# Patient Record
Sex: MALE | Race: WHITE | NOT HISPANIC OR LATINO | Employment: FULL TIME | URBAN - METROPOLITAN AREA
[De-identification: names, ages, dates, MRNs, and addresses within clinical notes are randomized per-mention and may not be internally consistent; named-entity substitution may affect disease eponyms.]

---

## 2018-01-11 NOTE — RESULT NOTES
Verified Results  (1) COMPREHENSIVE METABOLIC PANEL [id number] 38:15XY Brooks Valle [employer]     Test Name Result Flag Reference   GLUCOSE 108 mg/dL H 65-99   Fasting reference interval   UREA NITROGEN (BUN) 16 mg/dL  7-25   CREATININE 0 91 mg/dL  0 60-1 35   eGFR NON-AFR  AMERICAN 104 mL/min/1 73m2  > OR = 60   eGFR AFRICAN AMERICAN 120 mL/min/1 73m2  > OR = 60   BUN/CREATININE RATIO   1-14   NOT APPLICABLE (calc)   SODIUM 138 mmol/L  135-146   POTASSIUM 4 6 mmol/L  3 5-5 3   CHLORIDE 102 mmol/L     CARBON DIOXIDE 24 mmol/L  19-30   CALCIUM 9 8 mg/dL  8 6-10 3   PROTEIN, TOTAL 7 5 g/dL  6 1-8 1   ALBUMIN 4 6 g/dL  3 6-5 1   GLOBULIN 2 9 g/dL (calc)  1 9-3 7   ALBUMIN/GLOBULIN RATIO 1 6 (calc)  1 0-2 5   BILIRUBIN, TOTAL 0 6 mg/dL  0 2-1 2   ALKALINE PHOSPHATASE 75 U/L     AST 29 U/L  10-40   ALT 41 U/L  9-46   GLUCOSE 108 mg/dL H 65-99   Fasting reference interval   UREA NITROGEN (BUN) 16 mg/dL  7-25   CREATININE 0 91 mg/dL  0 60-1 35   eGFR NON-AFR  AMERICAN 104 mL/min/1 73m2  > OR = 60   eGFR AFRICAN AMERICAN 120 mL/min/1 73m2  > OR = 60   BUN/CREATININE RATIO   0-50   NOT APPLICABLE (calc)   SODIUM 138 mmol/L  135-146   POTASSIUM 4 6 mmol/L  3 5-5 3   CHLORIDE 102 mmol/L     CARBON DIOXIDE 24 mmol/L  19-30   CALCIUM 9 8 mg/dL  8 6-10 3   PROTEIN, TOTAL 7 5 g/dL  6 1-8 1   ALBUMIN 4 6 g/dL  3 6-5 1   GLOBULIN 2 9 g/dL (calc)  1 9-3 7   ALBUMIN/GLOBULIN RATIO 1 6 (calc)  1 0-2 5   BILIRUBIN, TOTAL 0 6 mg/dL  0 2-1 2   ALKALINE PHOSPHATASE 75 U/L     AST 29 U/L  10-40   ALT 41 U/L  9-46           (1) PSA (SCREEN) (Dx V76 44 Screen for Prostate Cancer) [id number] 08:58AM Brooks Valle [facility]   REPORT COMMENT:  FASTING:YES     Test Name Result Flag Reference   PSA, TOTAL 0 3 ng/mL  < OR = 4 0   This test was performed using the Siemens  chemiluminescent method  Values obtained from  different assay methods cannot be used  interchangeably   PSA levels, regardless of  value, should not be interpreted as absolute  evidence of the presence or absence of disease  (Q) THYROID CASCADING REFLEX 25Jun2016 08:58AM Xanderjacklyn Kory Benjamín     Test Name Result Flag Reference   TSH 1 72 mIU/L  0 40-4 50   INTERPRETATION      TSH within normal range  Consistent with euthyroid  patient  Interference from heterophilic antibodies (more common)  or autoantibody (less common) should be considered when   the TSH value does not fit the clinical picture  TSH   with HAMA Treatment (test code [phone]) or TSH Antibody   (test code 30725) may help identify such interferences  TSH 1 72 mIU/L  0 40-4 50   INTERPRETATION      TSH within normal range  Consistent with euthyroid  patient  Interference from heterophilic antibodies (more common)  or autoantibody (less common) should be considered when   the TSH value does not fit the clinical picture  TSH   with HAMA Treatment (test code [phone] 63) or TSH Antibody   (test code 54948) may help identify such interferences  (Q) LIPID PANEL WITH REFLEX TO DIRECT LDL [id number] 08:58AM Kory Valle     Test Name Result Flag Reference   CHOLESTEROL, TOTAL 187 mg/dL  125-200   HDL CHOLESTEROL 41 mg/dL  > OR = 40   TRIGLICERIDES 601 mg/dL H <150   LDL-CHOLESTEROL 113 mg/dL (calc)  <130   Desirable range <100 mg/dL for patients with CHD or  diabetes and <70 mg/dL for diabetic patients with  known heart disease  CHOL/HDLC RATIO 4 6 (calc)  < OR = 5 0   NON HDL CHOLESTEROL 146 mg/dL (calc)     Target for non-HDL cholesterol is 30 mg/dL higher than   LDL cholesterol target

## 2018-01-15 NOTE — PROGRESS NOTES
Assessment   1  Benign essential hypertension (401 1) (I10)  2  BMI 45 0-49 9, adult (V85 42) (Z68 42)  3  Morbid obesity (278 01) (E66 01)  4  High triglycerides (272 1) (E78 1)  5  Encounter for preventive health examination (V70 0) (Z00 00)  6  Prediabetes (790 29) (R73 09)    Plan  Benign essential hypertension    · Stop: Lisinopril 10 MG Oral Tablet   · Start: Lisinopril 20 MG Oral Tablet; 1 every day   · Begin a limited exercise program ; Status:Complete;   Done: 93OYY1305   · Restrict the salt in your diet by avoiding highly salted foods ; Status:Complete;   Done:  13HCY9041   · Shared Decision Making Aid given; Status:Complete;   Done: 64SKF9410  Colon cancer screening    · DIGITAL RECTAL EXAM; Status:Complete;   Done: 95UIF2484 12:00AM1   High triglycerides    · Follow-up visit in 1 month Evaluation and Treatment  Follow-up  Status: Complete -  Scheduling  Done: 65PKS3070 07:27PM     1 Amended By: Jose Escobar ; Jul 11 2016 9:31 PM EST    Discussion/Summary  Impression: health maintenance visit  Currently, he eats a poor diet and has an inadequate exercise regimen  Prostate cancer screening: the risks and benefits of prostate cancer screening were discussed  We should monitor stable controlled hypertension and prediabetes every 6 months but we need to increase your lisinopril from 10mg/d to 20mg/d and recheck in about 1 month  Chief Complaint  Seen for a CPE  er/cma  History of Present Illness  HPI: labs reviewed  no cough on lisinopril 10mg  not sure if ever was controlled in past  can do more TLC      Review of Systems    Cardiovascular: no chest pain and no palpitations  Respiratory: no shortness of breath and no cough  Gastrointestinal: no abdominal pain  Active Problems   1  Benign essential hypertension (401 1) (I10)  2  BMI 45 0-49 9, adult (V85 42) (Z68 42)  3  Colon cancer screening (V76 51) (Z12 11)  4  Immunization due (V05 9) (Z23)  5   Left shoulder pain (989 41) (M25 512)  6  Morbid obesity (278 01) (E66 01)  7  Osteoarthritis of both knees, unspecified osteoarthritis type (715 96) (M17 0)  8  Patellofemoral syndrome, bilateral (719 46) (M22 2X1,M22 2X2)  9  Prediabetes (790 29) (R73 09)  10  Prostate cancer screening (V76 44) (Z12 5)  11  Screening for cardiovascular condition (V81 2) (Z13 6)  12  Screening for diabetes mellitus (DM) (V77 1) (Z13 1)  13  Screening for lipid disorders (V77 91) (Z13 220)  14  Thyroid disorder screening (V77 0) (Z13 29)  15  Tinea cruris (110 3) (B35 6)    Past Medical History    · History of Acute maxillary sinusitis, recurrence not specified (461 0) (J01 00)   · History of hypertension (V12 59) (Z86 79)    Family History  Father    · No pertinent family history    Social History    · Alcohol drinker (V49 89) (Z78 9)   · Never a smoker    Current Meds  1  Lisinopril 10 MG Oral Tablet; 1 every day  Requested for: 13Jun2016; Last Rx:10Jun2016   Ordered    Allergies   1  No Known Drug Allergies    Vitals   Recorded: [id number] 04:53PM Recorded: 85NAR8826 04:37PM   Temperature  98 5 F   Heart Rate  80   Respiration  20   Systolic 640 254   Diastolic 94 96   Height  5 ft 11 4 in   Weight  348 lb 4 00 oz   BMI Calculated  48 03   BSA Calculated  2 68     Physical Exam    Constitutional   General appearance: No acute distress, well appearing and well nourished  Eyes   Conjunctiva and lids: No erythema, swelling or discharge  Pupils and irises: Equal, round, reactive to light  Ears, Nose, Mouth, and Throat   External inspection of ears and nose: Normal     Otoscopic examination: Tympanic membranes translucent with normal light reflex  Canals patent without erythema  Nasal mucosa, septum, and turbinates: Normal without edema or erythema  Lips, teeth, and gums: Normal, good dentition  Oropharynx: Normal with no erythema, edema, exudate or lesions  Neck   Neck: Supple, symmetric, trachea midline, no masses      Thyroid: Normal, no thyromegaly  Pulmonary   Respiratory effort: No increased work of breathing or signs of respiratory distress  Auscultation of lungs: Clear to auscultation  Cardiovascular   Auscultation of heart: Normal rate and rhythm, normal S1 and S2, no murmurs  Carotid pulses: 2+ bilaterally  Pedal pulses: 2+ bilaterally  Examination of extremities for edema and/or varicosities: Normal     Abdomen   Abdomen: Non-tender, no masses  Liver and spleen: No hepatomegaly or splenomegaly  Examination for hernias: No hernias appreciated  Genitourinary   Scrotal contents: Normal testes, no masses  Penis: Normal, no lesions  Digital rectal exam of prostate: Normal size, no masses  Lymphatic   Palpation of lymph nodes in neck: No lymphadenopathy  Palpation of lymph nodes in groin: No lymphadenopathy  Musculoskeletal   Gait and station: Normal     Inspection/palpation of digits and nails: Normal without clubbing or cyanosis  Inspection/palpation of joints, bones, and muscles: Normal     Range of motion: Normal     Stability: Normal     Muscle strength/tone: Normal     Skin   Skin and subcutaneous tissue: Normal without rashes or lesions  Palpation of skin and subcutaneous tissue: Normal turgor  Neurologic   Reflexes: 2+ and symmetric  Sensation: No sensory loss  Psychiatric   Judgment and insight: Normal     Mood and affect: Normal        Procedure    Procedure: Visual Acuity Test    Indication: routine screening  Inforrmation supplied by a Snellen chart     Results: 20/15 in both eyes without corrective device, 20/50 in the right eye without corrective device, 20/15 in the left eye without corrective device      Future Appointments    Date/Time Provider Specialty Site   08/10/2016 04:30 PM Marleny Thrasher, [id number]     Signatures   Electronically signed by : Ashely Bird DO; Jul 11 2016  9:31PM EST                       (Author)

## 2018-01-26 ENCOUNTER — TRANSCRIBE ORDERS (OUTPATIENT)
Dept: LAB | Facility: CLINIC | Age: 43
End: 2018-01-26

## 2018-02-01 DIAGNOSIS — Z00.00 HEALTHCARE MAINTENANCE: ICD-10-CM

## 2018-02-01 DIAGNOSIS — I10 BENIGN ESSENTIAL HYPERTENSION: Primary | ICD-10-CM

## 2018-02-01 DIAGNOSIS — R73.03 PREDIABETES: ICD-10-CM

## 2018-02-01 PROBLEM — Z23 IMMUNIZATION DUE: Status: ACTIVE | Noted: 2018-02-01

## 2018-02-01 PROBLEM — Z13.83 SCREENING FOR CARDIOVASCULAR, RESPIRATORY, AND GENITOURINARY DISEASES: Status: ACTIVE | Noted: 2018-02-01

## 2018-02-01 PROBLEM — Z13.89 SCREENING FOR CARDIOVASCULAR, RESPIRATORY, AND GENITOURINARY DISEASES: Status: ACTIVE | Noted: 2018-02-01

## 2018-02-01 PROBLEM — Z13.6 SCREENING FOR CARDIOVASCULAR, RESPIRATORY, AND GENITOURINARY DISEASES: Status: ACTIVE | Noted: 2018-02-01

## 2018-02-01 PROBLEM — Z12.5 PROSTATE CANCER SCREENING: Status: ACTIVE | Noted: 2018-02-01

## 2018-02-01 RX ORDER — LISINOPRIL 20 MG/1
TABLET ORAL DAILY
COMMUNITY
Start: 2016-07-11 | End: 2018-02-02 | Stop reason: SDUPTHER

## 2018-02-01 RX ORDER — ERGOCALCIFEROL 1.25 MG/1
CAPSULE ORAL
COMMUNITY
Start: 2018-01-31 | End: 2018-06-19

## 2018-02-02 ENCOUNTER — OFFICE VISIT (OUTPATIENT)
Dept: FAMILY MEDICINE CLINIC | Facility: CLINIC | Age: 43
End: 2018-02-02
Payer: COMMERCIAL

## 2018-02-02 VITALS
RESPIRATION RATE: 20 BRPM | BODY MASS INDEX: 44.1 KG/M2 | DIASTOLIC BLOOD PRESSURE: 92 MMHG | WEIGHT: 315 LBS | SYSTOLIC BLOOD PRESSURE: 140 MMHG | TEMPERATURE: 97.3 F | HEART RATE: 84 BPM | HEIGHT: 71 IN

## 2018-02-02 DIAGNOSIS — I10 BENIGN ESSENTIAL HYPERTENSION: ICD-10-CM

## 2018-02-02 PROBLEM — E55.9 VITAMIN D DEFICIENCY: Status: ACTIVE | Noted: 2018-01-31

## 2018-02-02 PROBLEM — E11.8 TYPE 2 DIABETES MELLITUS WITH COMPLICATION, WITHOUT LONG-TERM CURRENT USE OF INSULIN (HCC): Status: ACTIVE | Noted: 2018-01-31

## 2018-02-02 PROBLEM — R06.83 SNORING: Status: ACTIVE | Noted: 2018-01-17

## 2018-02-02 PROCEDURE — 99214 OFFICE O/P EST MOD 30 MIN: CPT | Performed by: FAMILY MEDICINE

## 2018-02-02 RX ORDER — LISINOPRIL 20 MG/1
20 TABLET ORAL DAILY
Qty: 90 TABLET | Refills: 0 | Status: SHIPPED | OUTPATIENT
Start: 2018-02-02 | End: 2018-05-03 | Stop reason: SDUPTHER

## 2018-02-02 NOTE — PROGRESS NOTES
Assessment/Plan:    No problem-specific Assessment & Plan notes found for this encounter  Diagnoses and all orders for this visit:    Benign essential hypertension          Low vit D on meds  Obesity , TLC in progress  Normal foot exam  Stop metformin and recheck  Labs once bp meds stable    Return in about 4 weeks (around 3/2/2018) for Recheck  Subjective:      Patient ID: Evelyne Spurling is a 38 y o  male  Chief Complaint   Patient presents with    Follow-up     Labs from bariatric doctors       Newly dx DM started on metformin 2d ago, 500mg 1x/d by bariatric surgeon in [facility]  Elevated LFTs, has order for US liver  A1c 8 3  Started diet for past 2w    No surgery planned but nonsurgical  25# wt loss in past month  Bariatric program q 3w  Vit D 10, started on drisdol 50K units    Calories / d 5903-0480/d  Starting to exercise      Just restarted lisinopril 20mg/d this week  Did not monitor bp in past 2y  Running out of it  No cough    Sleep study pending at [facility] per pt          The following portions of the patient's history were reviewed and updated as appropriate: allergies, current medications, past family history, past medical history, past social history, past surgical history and problem list     Review of Systems   Constitutional: Negative for chills and fever  Respiratory: Negative for cough  Cardiovascular: Negative for chest pain  Gastrointestinal: Negative for blood in stool and constipation  Musculoskeletal: Positive for joint swelling  Neurological: Negative for dizziness and light-headedness  Current Outpatient Prescriptions   Medication Sig Dispense Refill    ergocalciferol (VITAMIN D2) 50,000 units       lisinopril (ZESTRIL) 20 mg tablet Take by mouth daily       No current facility-administered medications for this visit          Objective:    /92   Pulse 84   Temp (!) 97 3 °F (36 3 °C)   Resp 20   Ht 5' 10 5" (1 791 m)   Wt (!) 159 kg (350 lb)   BMI 49 51 kg/m²        Physical Exam   Constitutional: He appears well-developed  HENT:   Head: Normocephalic  Eyes: Conjunctivae are normal    Neck: Neck supple  Cardiovascular: Normal rate and intact distal pulses  Pulmonary/Chest: Effort normal  No respiratory distress  Abdominal: Soft  Musculoskeletal: He exhibits no edema or deformity  Neurological: He is alert  Skin: Skin is warm and dry  Psychiatric: His behavior is normal  Thought content normal    Nursing note and vitals reviewed               Monisha Everett DO

## 2018-02-02 NOTE — PROGRESS NOTES
Diabetic Foot Exam    Patient's shoes and socks removed  Right Foot/Ankle   Right Foot Inspection  Skin Exam: callus and callus                                  Left Foot/Ankle  Left Foot Inspection  Skin Exam: callus                                             Assign Risk Category:  No deformity present; No loss of protective sensation;  No weak pulses       Risk: 0

## 2018-02-02 NOTE — PATIENT INSTRUCTIONS
Though I recognize that you are diabetic, please consider stopping the metformin and continuing your successful diet/exercise/weight loss efforts for the next 90 days after which we should recheck the values that would prove Diabetes if still present      Please follow up in 2-3 weeks for your Hypertension

## 2018-05-03 DIAGNOSIS — I10 BENIGN ESSENTIAL HYPERTENSION: ICD-10-CM

## 2018-05-03 DIAGNOSIS — I10 ESSENTIAL HYPERTENSION: Primary | ICD-10-CM

## 2018-05-03 RX ORDER — LISINOPRIL 20 MG/1
20 TABLET ORAL DAILY
Qty: 30 TABLET | Refills: 0 | Status: SHIPPED | OUTPATIENT
Start: 2018-05-03 | End: 2018-07-30 | Stop reason: SDUPTHER

## 2018-06-19 ENCOUNTER — OFFICE VISIT (OUTPATIENT)
Dept: FAMILY MEDICINE CLINIC | Facility: CLINIC | Age: 43
End: 2018-06-19
Payer: COMMERCIAL

## 2018-06-19 VITALS
WEIGHT: 311 LBS | DIASTOLIC BLOOD PRESSURE: 80 MMHG | HEART RATE: 78 BPM | BODY MASS INDEX: 43.54 KG/M2 | TEMPERATURE: 97.4 F | RESPIRATION RATE: 18 BRPM | HEIGHT: 71 IN | SYSTOLIC BLOOD PRESSURE: 132 MMHG

## 2018-06-19 DIAGNOSIS — J06.9 ACUTE UPPER RESPIRATORY INFECTION: Primary | ICD-10-CM

## 2018-06-19 PROCEDURE — 99213 OFFICE O/P EST LOW 20 MIN: CPT | Performed by: NURSE PRACTITIONER

## 2018-06-19 RX ORDER — ALBUTEROL SULFATE 90 UG/1
2 AEROSOL, METERED RESPIRATORY (INHALATION) EVERY 4 HOURS PRN
Qty: 8.5 G | Refills: 0 | Status: SHIPPED | OUTPATIENT
Start: 2018-06-19 | End: 2019-03-01

## 2018-06-19 RX ORDER — AMOXICILLIN 875 MG/1
875 TABLET, COATED ORAL 2 TIMES DAILY
Qty: 20 TABLET | Refills: 0 | Status: SHIPPED | OUTPATIENT
Start: 2018-06-19 | End: 2018-06-29

## 2018-06-19 NOTE — PROGRESS NOTES
Assessment/Plan:    Problem List Items Addressed This Visit     None      Visit Diagnoses     Acute upper respiratory infection    -  Primary    Relevant Medications    amoxicillin (AMOXIL) 875 mg tablet    albuterol (PROAIR HFA) 90 mcg/act inhaler          Patient Instructions   Drinking plenty of fluids, saline nasal rinses or nasal spray, and steam or cool air humidification are all effective ways of managing symptoms  May take Mucinex D for sinus congestion, or Coricidin HBP if you have a heart condition or high blood pressure  Mucinex or Robitussin DM are used to control cough symptoms and include an expectorant  May take Ibuprofen or Tylenol as needed for pain or fevers  Recommend recheck if symptoms do not resolve or improve within 1 week  Return if symptoms worsen or fail to improve  Subjective:      Patient ID: Yrn Mims is a 38 y o  male  Chief Complaint   Patient presents with    Nasal Congestion     for the past week  [id number]    Sore Throat    Cough       C/o sinus congestion, productive cough, headache, and dizziness  Denies fevers, SOB, but has occasional wheezing  Cough is painful  Tried Advil cold and sinus which isn't helping  No history of seasonal allergies        The following portions of the patient's history were reviewed and updated as appropriate: allergies, current medications, past family history, past medical history, past social history, past surgical history and problem list     Review of Systems   Constitutional: Positive for fatigue  Negative for chills and fever  HENT: Positive for congestion, sinus pressure and sore throat  Negative for ear pain, postnasal drip and rhinorrhea  Respiratory: Positive for cough and wheezing  Negative for shortness of breath  Cardiovascular: Negative for chest pain  Gastrointestinal: Negative for abdominal pain, diarrhea, nausea and vomiting  Musculoskeletal: Negative for arthralgias     Skin: Negative for rash    Neurological: Positive for headaches  Current Outpatient Prescriptions   Medication Sig Dispense Refill    lisinopril (ZESTRIL) 20 mg tablet TAKE 1 TABLET (20 MG TOTAL) BY MOUTH DAILY 30 tablet 0    albuterol (PROAIR HFA) 90 mcg/act inhaler Inhale 2 puffs every 4 (four) hours as needed for wheezing 8 5 g 0    amoxicillin (AMOXIL) 875 mg tablet Take 1 tablet (875 mg total) by mouth 2 (two) times a day for 10 days 20 tablet 0     No current facility-administered medications for this visit  Objective:    /80   Pulse 78   Temp (!) 97 4 °F (36 3 °C)   Resp 18   Ht 5' 10 5" (1 791 m)   Wt (!) 141 kg (311 lb)   BMI 43 99 kg/m²        Physical Exam   Constitutional: He appears well-developed and well-nourished  HENT:   Right Ear: External ear and ear canal normal  Tympanic membrane is bulging  Left Ear: External ear and ear canal normal  Tympanic membrane is bulging  Nose: Mucosal edema present  Right sinus exhibits maxillary sinus tenderness  Left sinus exhibits maxillary sinus tenderness  Mouth/Throat: Oropharynx is clear and moist and mucous membranes are normal    Eyes: Conjunctivae are normal    Cardiovascular: Normal rate, regular rhythm and normal heart sounds  Pulmonary/Chest: Effort normal  He has wheezes  Abdominal: Bowel sounds are normal  He exhibits no distension  There is no splenomegaly or hepatomegaly  There is no tenderness  Lymphadenopathy:        Right cervical: No superficial cervical adenopathy present  Left cervical: No superficial cervical adenopathy present  Skin: No rash noted  Psychiatric: He has a normal mood and affect  Nursing note and vitals reviewed               Keyon Watts

## 2018-07-30 ENCOUNTER — TELEPHONE (OUTPATIENT)
Dept: FAMILY MEDICINE CLINIC | Facility: CLINIC | Age: 43
End: 2018-07-30

## 2018-07-30 DIAGNOSIS — I10 BENIGN ESSENTIAL HYPERTENSION: ICD-10-CM

## 2018-07-30 PROCEDURE — 4010F ACE/ARB THERAPY RXD/TAKEN: CPT | Performed by: FAMILY MEDICINE

## 2018-07-30 RX ORDER — LISINOPRIL 20 MG/1
20 TABLET ORAL DAILY
Qty: 30 TABLET | Refills: 3 | Status: SHIPPED | OUTPATIENT
Start: 2018-07-30 | End: 2018-12-01 | Stop reason: SDUPTHER

## 2018-07-30 NOTE — TELEPHONE ENCOUNTER
Pt stated he called in a prescription for his Lisinopril, he is out can another Dr Taryn Guzmans this since Dr Lore Cowan is out of the office?  It is supposed to go to [facility] in [place], he called over there and it has yet to be done

## 2018-08-07 ENCOUNTER — OFFICE VISIT (OUTPATIENT)
Dept: FAMILY MEDICINE CLINIC | Facility: CLINIC | Age: 43
End: 2018-08-07
Payer: COMMERCIAL

## 2018-08-07 VITALS
HEIGHT: 71 IN | SYSTOLIC BLOOD PRESSURE: 138 MMHG | BODY MASS INDEX: 43.96 KG/M2 | HEART RATE: 84 BPM | DIASTOLIC BLOOD PRESSURE: 84 MMHG | RESPIRATION RATE: 20 BRPM | WEIGHT: 314 LBS | TEMPERATURE: 98 F

## 2018-08-07 DIAGNOSIS — S16.1XXA CERVICAL MYOFASCIAL STRAIN, INITIAL ENCOUNTER: Primary | ICD-10-CM

## 2018-08-07 DIAGNOSIS — H57.89 REDNESS OF EYE, LEFT: ICD-10-CM

## 2018-08-07 DIAGNOSIS — H57.89: ICD-10-CM

## 2018-08-07 PROCEDURE — 3008F BODY MASS INDEX DOCD: CPT | Performed by: NURSE PRACTITIONER

## 2018-08-07 PROCEDURE — 1036F TOBACCO NON-USER: CPT | Performed by: NURSE PRACTITIONER

## 2018-08-07 PROCEDURE — 99214 OFFICE O/P EST MOD 30 MIN: CPT | Performed by: NURSE PRACTITIONER

## 2018-08-07 RX ORDER — CYCLOBENZAPRINE HCL 10 MG
10 TABLET ORAL 3 TIMES DAILY PRN
Qty: 30 TABLET | Refills: 0 | Status: SHIPPED | OUTPATIENT
Start: 2018-08-07 | End: 2019-01-22 | Stop reason: ALTCHOICE

## 2018-08-07 NOTE — LETTER
August 7, 2018    Armida Farah  [facility] [address]      Dear Mr Jenkins:    ***    If you have any questions or concerns, please don't hesitate to call      Sincerely,             LARS Graves        CC: No Recipients

## 2018-08-07 NOTE — PROGRESS NOTES
LAssessment/Plan:    Recommended eval by his ophthalmologist   Lindsay Gasca to call office if he is unable to get an appt for today or tomorrow  Advised on supportive care of cervical strain  Moist heat, Ibuprofen as needed  Muscle relaxer as needed  May not drive while taking this  Problem List Items Addressed This Visit     None      Visit Diagnoses     Cervical myofascial strain, initial encounter    -  Primary    Relevant Medications    cyclobenzaprine (FLEXERIL) 10 mg tablet    Swelling or mass of left eye        Redness of eye, left              There are no Patient Instructions on file for this visit  Return if symptoms worsen or fail to improve  Subjective:      Patient ID: Ramakrishna Perez is a 38 y o  male  Chief Complaint   Patient presents with    Eye Pain     Left eye redness with tenderness and he can see a bump in his eye Binta VIRGEN       Yesterday his left eye started to become red  Later in the evening, he noticed a small blister or bump the left of his iris  It feels swollen and is somewhat painful  Denies blurred vision or floaters  Denies eye drainage  No known trauma    Also c/o right sided neck pain for the past couple of weeks  He takes Ibuprofen 800mg which doesn't help  Hurts to turn his neck and look up  Heat does not help  Pain does not radiate  No numbness or weakness  No known injury  The following portions of the patient's history were reviewed and updated as appropriate: allergies, current medications, past family history, past medical history, past social history, past surgical history and problem list     Review of Systems   Constitutional: Negative  Eyes: Positive for pain and redness  Negative for photophobia, discharge and visual disturbance  Respiratory: Negative for cough and shortness of breath  Cardiovascular: Negative for chest pain  Musculoskeletal: Positive for neck pain  Neurological: Negative for weakness and numbness     All other systems reviewed and are negative  Current Outpatient Prescriptions   Medication Sig Dispense Refill    albuterol (PROAIR HFA) 90 mcg/act inhaler Inhale 2 puffs every 4 (four) hours as needed for wheezing 8 5 g 0    lisinopril (ZESTRIL) 20 mg tablet Take 1 tablet (20 mg total) by mouth daily 30 tablet 3    cyclobenzaprine (FLEXERIL) 10 mg tablet Take 1 tablet (10 mg total) by mouth 3 (three) times a day as needed for muscle spasms 30 tablet 0     No current facility-administered medications for this visit  Objective:    /84   Pulse 84   Temp 98 °F (36 7 °C)   Resp 20   Ht 5' 10 5" (1 791 m)   Wt (!) 142 kg (314 lb)   BMI 44 42 kg/m²        Physical Exam   Constitutional: He appears well-developed and well-nourished  HENT:   Right Ear: Tympanic membrane, external ear and ear canal normal    Left Ear: Tympanic membrane, external ear and ear canal normal    Nose: No mucosal edema  Mouth/Throat: Oropharynx is clear and moist and mucous membranes are normal    Eyes: Conjunctivae and EOM are normal  Pupils are equal, round, and reactive to light  Right eye exhibits no discharge  Left eye exhibits no discharge  Raised vesicular appearing lesion aprox 1-2mm in size  Erythematous capillaries extending from lesion to outer aspect of eye  No subconjunctival hemorrhage  Cardiovascular: Normal rate, regular rhythm and normal heart sounds  Pulmonary/Chest: Effort normal and breath sounds normal    Abdominal: Bowel sounds are normal  He exhibits no distension  There is no splenomegaly or hepatomegaly  There is no tenderness  Lymphadenopathy:        Right cervical: No superficial cervical adenopathy present  Left cervical: No superficial cervical adenopathy present  Skin: No rash noted  Psychiatric: He has a normal mood and affect  Nursing note and vitals reviewed               Chelo Monterroso

## 2018-12-01 DIAGNOSIS — I10 BENIGN ESSENTIAL HYPERTENSION: ICD-10-CM

## 2018-12-02 PROCEDURE — 4010F ACE/ARB THERAPY RXD/TAKEN: CPT | Performed by: FAMILY MEDICINE

## 2018-12-02 RX ORDER — LISINOPRIL 20 MG/1
TABLET ORAL
Qty: 30 TABLET | Refills: 0 | Status: SHIPPED | OUTPATIENT
Start: 2018-12-02 | End: 2019-01-22 | Stop reason: SDUPTHER

## 2018-12-31 ENCOUNTER — TELEPHONE (OUTPATIENT)
Dept: FAMILY MEDICINE CLINIC | Facility: CLINIC | Age: 43
End: 2018-12-31

## 2018-12-31 NOTE — TELEPHONE ENCOUNTER
Spoke with patient states going to [facility]ariat[facility], and having DM follow up with Dr Mignon Leach  af/rma

## 2019-01-21 PROBLEM — E11.8 TYPE 2 DIABETES MELLITUS WITH COMPLICATION, WITHOUT LONG-TERM CURRENT USE OF INSULIN (HCC): Status: RESOLVED | Noted: 2018-01-31 | Resolved: 2019-01-21

## 2019-01-21 RX ORDER — INFLUENZA A VIRUS A/SINGAPORE/GP1908/2015 IVR-180A (H1N1) ANTIGEN (PROPIOLACTONE INACTIVATED), INFLUENZA A VIRUS A/HONG KONG/4801/2014 X-263B (H3N2) ANTIGEN (PROPIOLACTONE INACTIVATED), INFLUENZA B VIRUS B/BRISBANE/46/2015 ANTIGEN (PROPIOLACTONE INACTIVATED), AND INFLUENZA B VIRUS B/PHUKET/3073/2013 BVR-1B ANTIGEN (PROPIOLACTONE INACTIVATED) 15; 15; 15; 15 UG/.5ML; UG/.5ML; UG/.5ML; UG/.5ML
INJECTION, SUSPENSION INTRAMUSCULAR
Refills: 0 | COMMUNITY
Start: 2018-10-28 | End: 2019-04-11

## 2019-01-22 ENCOUNTER — OFFICE VISIT (OUTPATIENT)
Dept: FAMILY MEDICINE CLINIC | Facility: CLINIC | Age: 44
End: 2019-01-22
Payer: COMMERCIAL

## 2019-01-22 VITALS
TEMPERATURE: 96.4 F | HEART RATE: 76 BPM | BODY MASS INDEX: 44.1 KG/M2 | RESPIRATION RATE: 16 BRPM | DIASTOLIC BLOOD PRESSURE: 82 MMHG | HEIGHT: 71 IN | SYSTOLIC BLOOD PRESSURE: 130 MMHG | WEIGHT: 315 LBS

## 2019-01-22 DIAGNOSIS — L30.9 DERMATITIS: Primary | ICD-10-CM

## 2019-01-22 DIAGNOSIS — R73.03 PREDIABETES: ICD-10-CM

## 2019-01-22 DIAGNOSIS — Z12.5 PROSTATE CANCER SCREENING: ICD-10-CM

## 2019-01-22 DIAGNOSIS — Z13.89 SCREENING FOR CARDIOVASCULAR, RESPIRATORY, AND GENITOURINARY DISEASES: ICD-10-CM

## 2019-01-22 DIAGNOSIS — L21.9 SEBORRHEIC DERMATITIS: ICD-10-CM

## 2019-01-22 DIAGNOSIS — Z13.6 SCREENING FOR CARDIOVASCULAR, RESPIRATORY, AND GENITOURINARY DISEASES: ICD-10-CM

## 2019-01-22 DIAGNOSIS — I10 BENIGN ESSENTIAL HYPERTENSION: ICD-10-CM

## 2019-01-22 DIAGNOSIS — Z13.83 SCREENING FOR CARDIOVASCULAR, RESPIRATORY, AND GENITOURINARY DISEASES: ICD-10-CM

## 2019-01-22 DIAGNOSIS — E66.01 MORBID OBESITY (HCC): ICD-10-CM

## 2019-01-22 DIAGNOSIS — I10 ESSENTIAL HYPERTENSION: ICD-10-CM

## 2019-01-22 PROCEDURE — 3079F DIAST BP 80-89 MM HG: CPT | Performed by: FAMILY MEDICINE

## 2019-01-22 PROCEDURE — 3075F SYST BP GE 130 - 139MM HG: CPT | Performed by: FAMILY MEDICINE

## 2019-01-22 PROCEDURE — 99214 OFFICE O/P EST MOD 30 MIN: CPT | Performed by: FAMILY MEDICINE

## 2019-01-22 RX ORDER — LISINOPRIL 20 MG/1
20 TABLET ORAL DAILY
Qty: 90 TABLET | Refills: 1 | Status: SHIPPED | OUTPATIENT
Start: 2019-01-22 | End: 2019-10-20

## 2019-01-22 RX ORDER — TRIAMCINOLONE ACETONIDE 1 MG/G
CREAM TOPICAL 2 TIMES DAILY
Qty: 80 G | Refills: 1 | Status: SHIPPED | OUTPATIENT
Start: 2019-01-22 | End: 2019-02-07 | Stop reason: ALTCHOICE

## 2019-01-22 RX ORDER — SELENIUM SULFIDE 2.5 MG/100ML
LOTION TOPICAL
Qty: 120 ML | Refills: 5 | Status: SHIPPED | OUTPATIENT
Start: 2019-01-22 | End: 2019-08-21 | Stop reason: ALTCHOICE

## 2019-01-22 NOTE — PROGRESS NOTES
Assessment/Plan:    No problem-specific Assessment & Plan notes found for this encounter  Seborrheic dermatitis vs eczema new  cpe after labs suggested  htn meds refilled, needs q6m f/u, htn stable  Prediabetes and bmi aware  Diet/exercise/weight loss is recommended  Use steroids sparingly  See if selsun lotion works     Diagnoses and all orders for this visit:    Dermatitis  -     triamcinolone (KENALOG) 0 1 % cream; Apply topically 2 (two) times a day Short term, body/ears    Essential hypertension    Prostate cancer screening  -     PSA, Total Screen; Future    Seborrheic dermatitis  -     selenium sulfide (SELSUN) 2 5 % shampoo; Lather whole body for 10 minutes daily for 7 days then 3x/week for prevention    Screening for cardiovascular, respiratory, and genitourinary diseases  -     Lipid Panel with Direct LDL reflex; Future    Prediabetes    Benign essential hypertension  -     lisinopril (ZESTRIL) 20 mg tablet; Take 1 tablet (20 mg total) by mouth daily  -     Comprehensive metabolic panel; Future    Morbid obesity ([facility] [address] )    BMI 40 0-44 9, adult ([facility] [address] )    Other orders  -     AFLURIA QUADRIVALENT 0 5 ML JUAN PABLO; ADM 0 5ML IM UTD              Return for Annual physical     Subjective:      Patient ID: Layla Redding is a 37 y o  male      Chief Complaint   Patient presents with    Rash     under both arms and now spreading to lower arm    Dry Ears     pt states when he scratches they will bleed     Medication Refill     Lisinopril  sent to [facility] pharmacy       HPI  Past year seeing bariatric group  No surgery planned  Lost 80# with TLC  Avoids sugar  htn  Advised of q6m  Exercise about 5x/w  Gets a1c  LFT elevated  Not on metformin    Ears dry  Itchy  Scaly  R>L  No hearing problems    3d  Redness around armpits  Not itchy  No pus or drainage or pus  Used some lotrisone, goes away with tx but comes back  Also on leg and shoulders   Notes since related    Warm showers  Sometimes showers at gym  No cosmetic changes  No pool or jacuzzi    The following portions of the patient's history were reviewed and updated as appropriate: allergies, current medications, past family history, past medical history, past social history, past surgical history and problem list     Review of Systems   Constitutional: Negative for chills and fever  Respiratory: Negative for shortness of breath  Current Outpatient Prescriptions   Medication Sig Dispense Refill    lisinopril (ZESTRIL) 20 mg tablet Take 1 tablet (20 mg total) by mouth daily 90 tablet 1    AFLURIA QUADRIVALENT 0 5 ML JUAN PABLO ADM 0 5ML IM UTD  0    albuterol (PROAIR HFA) 90 mcg/act inhaler Inhale 2 puffs every 4 (four) hours as needed for wheezing (Patient not taking: Reported on 1/22/2019 ) 8 5 g 0    selenium sulfide (SELSUN) 2 5 % shampoo Lather whole body for 10 minutes daily for 7 days then 3x/week for prevention 120 mL 5    triamcinolone (KENALOG) 0 1 % cream Apply topically 2 (two) times a day Short term, body/ears 80 g 1     No current facility-administered medications for this visit  Objective:    /82   Pulse 76   Temp (!) 96 4 °F (35 8 °C)   Resp 16   Ht 5' 10 5" (1 791 m)   Wt (!) 143 kg (316 lb)   BMI 44 70 kg/m²        Physical Exam   Constitutional: He appears well-developed  No distress  HENT:   Head: Normocephalic  Mouth/Throat: No oropharyngeal exudate  Eyes: Conjunctivae are normal  No scleral icterus  Neck: Neck supple  Cardiovascular: Normal rate and intact distal pulses  No murmur heard  Pulmonary/Chest: Effort normal  No respiratory distress  He has no wheezes  Abdominal: Soft  He exhibits no distension  There is no tenderness  Musculoskeletal: He exhibits no edema or deformity  Lymphadenopathy:     He has no cervical adenopathy  Neurological: He is alert  He exhibits normal muscle tone  Skin: Skin is warm and dry  Rash noted     Scaly rash around b/l ear creases, eczematous patches arms/chest, no vesicle/pustule/dc   Psychiatric: His behavior is normal  Thought content normal    Nursing note and vitals reviewed               Roxanna Montoya DO

## 2019-02-07 ENCOUNTER — OFFICE VISIT (OUTPATIENT)
Dept: FAMILY MEDICINE CLINIC | Facility: CLINIC | Age: 44
End: 2019-02-07
Payer: COMMERCIAL

## 2019-02-07 VITALS
TEMPERATURE: 97.1 F | SYSTOLIC BLOOD PRESSURE: 122 MMHG | HEART RATE: 74 BPM | WEIGHT: 309.8 LBS | BODY MASS INDEX: 43.37 KG/M2 | RESPIRATION RATE: 16 BRPM | HEIGHT: 71 IN | DIASTOLIC BLOOD PRESSURE: 82 MMHG

## 2019-02-07 DIAGNOSIS — R21 RASH: Primary | ICD-10-CM

## 2019-02-07 PROCEDURE — 99213 OFFICE O/P EST LOW 20 MIN: CPT | Performed by: NURSE PRACTITIONER

## 2019-02-07 PROCEDURE — 3008F BODY MASS INDEX DOCD: CPT | Performed by: NURSE PRACTITIONER

## 2019-02-07 RX ORDER — CLOTRIMAZOLE AND BETAMETHASONE DIPROPIONATE 10; .64 MG/G; MG/G
CREAM TOPICAL 2 TIMES DAILY
Qty: 30 G | Refills: 0 | Status: SHIPPED | OUTPATIENT
Start: 2019-02-07 | End: 2019-04-11

## 2019-02-07 RX ORDER — PREDNISONE 10 MG/1
TABLET ORAL
Qty: 20 TABLET | Refills: 0 | Status: SHIPPED | OUTPATIENT
Start: 2019-02-07 | End: 2019-02-17

## 2019-02-07 RX ORDER — FLUCONAZOLE 150 MG/1
150 TABLET ORAL
Qty: 3 TABLET | Refills: 0 | Status: SHIPPED | OUTPATIENT
Start: 2019-02-07 | End: 2019-02-22

## 2019-02-07 NOTE — PATIENT INSTRUCTIONS
Follow up with dermatologist   Supportive care discussed and advised  Follow up for no improvement and worsening of conditions  Patient advised and educated when to see immediate medical care

## 2019-02-07 NOTE — PROGRESS NOTES
Assessment/Plan:         Diagnoses and all orders for this visit:    Rash  Comments:  fungal and eczema is in differential and will treat with fluconzaole and steroids and will follow up with dermatology  Orders:  -     predniSONE 10 mg tablet; 4 tabs x 2 days, 3 tabs x 2 days, 2 tabs x 2 days, 1 tab x 2 days  -     fluconazole (DIFLUCAN) 150 mg tablet; Take 1 tablet (150 mg total) by mouth every 7 days for 3 doses  -     clotrimazole-betamethasone (LOTRISONE) 1-0 05 % cream; Apply topically 2 (two) times a day  -     Ambulatory referral to Dermatology; Future  -     Ambulatory referral to Dermatology; Future            Patient Instructions: Follow up with dermatologist   Supportive care discussed and advised  Follow up for no improvement and worsening of conditions  Patient advised and educated when to see immediate medical care  Return if symptoms worsen or fail to improve  Subjective:      Patient ID: Kathryn Atkinson is a 37 y o  male  Chief Complaint   Patient presents with    Rash     xfew months  [facility]  Patient was seen in office on 1/22 for rash and dry ears  Stated that used kenalog cream on ears and helped and when he stopped using cream, the dryness and itching of ears came back  Also used shampoo selenium and helped but after using 8 days stopped and rash is coming back on his body and scalp  Stated that now rash is on arms, abdomen, scalp  Stated that always had the eczema rash on extensors of elbows  Goes to gym every day  The following portions of the patient's history were reviewed and updated as appropriate: allergies, current medications, past family history, past medical history, past social history, past surgical history and problem list       Review of Systems   Constitutional: Negative  Respiratory: Negative  Cardiovascular: Negative  Gastrointestinal: Negative  Genitourinary: Negative  Musculoskeletal: Negative  Skin: Positive for rash  Neurological: Negative  Psychiatric/Behavioral: Negative  Objective:    History   Smoking Status    Former Smoker   Smokeless Tobacco    Former User     Comment: Never smoker per [employer]       Allergies: No Known Allergies    Vitals:  /82   Pulse 74   Temp (!) 97 1 °F (36 2 °C)   Resp 16   Ht 5' 10 5" (1 791 m)   Wt (!) 141 kg (309 lb 12 8 oz)   BMI 43 82 kg/m²     Current Outpatient Prescriptions   Medication Sig Dispense Refill    AFLURIA QUADRIVALENT 0 5 ML JUAN PABLO ADM 0 5ML IM UTD  0    lisinopril (ZESTRIL) 20 mg tablet Take 1 tablet (20 mg total) by mouth daily 90 tablet 1    selenium sulfide (SELSUN) 2 5 % shampoo Lather whole body for 10 minutes daily for 7 days then 3x/week for prevention 120 mL 5    albuterol (PROAIR HFA) 90 mcg/act inhaler Inhale 2 puffs every 4 (four) hours as needed for wheezing (Patient not taking: Reported on 1/22/2019 ) 8 5 g 0    clotrimazole-betamethasone (LOTRISONE) 1-0 05 % cream Apply topically 2 (two) times a day 30 g 0    fluconazole (DIFLUCAN) 150 mg tablet Take 1 tablet (150 mg total) by mouth every 7 days for 3 doses 3 tablet 0    predniSONE 10 mg tablet 4 tabs x 2 days, 3 tabs x 2 days, 2 tabs x 2 days, 1 tab x 2 days 20 tablet 0     No current facility-administered medications for this visit  Physical Exam   Constitutional: He is oriented to person, place, and time  He appears well-developed and well-nourished  Cardiovascular: Normal rate, regular rhythm and normal heart sounds  Pulmonary/Chest: Effort normal and breath sounds normal    Neurological: He is alert and oriented to person, place, and time  Skin: Skin is warm and dry  Rash noted  Eczema patches noted on extensors of bilateral elbows  Dry scaly rash on b/l ear at creases  Maculopapular scattered spots on both arms, abdominal area  Psychiatric: He has a normal mood and affect   His behavior is normal  Judgment and thought content normal  LARS Soni

## 2019-03-01 ENCOUNTER — OFFICE VISIT (OUTPATIENT)
Dept: URGENT CARE | Facility: CLINIC | Age: 44
End: 2019-03-01
Payer: COMMERCIAL

## 2019-03-01 VITALS
HEIGHT: 71 IN | OXYGEN SATURATION: 96 % | RESPIRATION RATE: 15 BRPM | DIASTOLIC BLOOD PRESSURE: 75 MMHG | HEART RATE: 94 BPM | BODY MASS INDEX: 44.1 KG/M2 | TEMPERATURE: 96.6 F | SYSTOLIC BLOOD PRESSURE: 144 MMHG | WEIGHT: 315 LBS

## 2019-03-01 DIAGNOSIS — J00 NASOPHARYNGITIS: Primary | ICD-10-CM

## 2019-03-01 LAB — S PYO AG THROAT QL: NEGATIVE

## 2019-03-01 PROCEDURE — G0382 LEV 3 HOSP TYPE B ED VISIT: HCPCS | Performed by: PHYSICIAN ASSISTANT

## 2019-03-01 PROCEDURE — 87430 STREP A AG IA: CPT | Performed by: PHYSICIAN ASSISTANT

## 2019-03-01 RX ORDER — FLUOCINOLONE ACETONIDE 0.11 MG/ML
OIL AURICULAR (OTIC)
Refills: 1 | COMMUNITY
Start: 2019-02-12 | End: 2019-04-11

## 2019-03-01 RX ORDER — CALCIPOTRIENE 50 UG/G
CREAM TOPICAL
Refills: 5 | COMMUNITY
Start: 2019-02-12 | End: 2019-04-11

## 2019-03-01 RX ORDER — BENZONATATE 200 MG/1
200 CAPSULE ORAL 3 TIMES DAILY PRN
Qty: 20 CAPSULE | Refills: 0 | Status: SHIPPED | OUTPATIENT
Start: 2019-03-01 | End: 2019-08-21 | Stop reason: ALTCHOICE

## 2019-03-01 RX ORDER — BETAMETHASONE DIPROPIONATE 0.5 MG/G
CREAM TOPICAL
Refills: 2 | COMMUNITY
Start: 2019-02-11 | End: 2019-04-11

## 2019-03-01 RX ORDER — ALBUTEROL SULFATE 90 UG/1
2 AEROSOL, METERED RESPIRATORY (INHALATION) EVERY 4 HOURS PRN
Qty: 18 G | Refills: 0 | Status: SHIPPED | OUTPATIENT
Start: 2019-03-01 | End: 2019-08-21 | Stop reason: ALTCHOICE

## 2019-03-01 NOTE — PROGRESS NOTES
[address] [facility]        NAME: Yash Dias is a 37 y o  male  : 1975    MRN: [id number]  DATE: 2019  TIME: 3:47 PM    Assessment and Plan   Nasopharyngitis [J00]  1  Nasopharyngitis  POCT rapid strepA    albuterol (VENTOLIN HFA) 90 mcg/act inhaler    benzonatate (TESSALON) 200 MG capsule         Patient Instructions     Albuterol inhaler as directed  Benzonatate prescription as directed for cough  Afrin nasal spray as directed for sinus congestion  Plain Mucinex to thin mucous  Increase fluids  Follow up with PCP in 3-5 days  Proceed to  ER if symptoms worsen  Chief Complaint     Chief Complaint   Patient presents with    Sore Throat     sinus/coughing/tight in the chest and pt reports having a fever and having lethargy x 3 days         History of Present Illness       Pt is a 37 yr old male presenting for sinus congestion, post-nasal drip, ST, cough, wheezing, fever x 3 days  He states he does not get sick often but when he does it either turns into Strep or Bronchitis  He has a hx of bronchitis when he gets ill, no formal diagnosis of asthma but he believes he may have it  He is a non-smoker  He has been taking Advil Cold and Sinus with mild relief  Sick contacts at work  Review of Systems   Review of Systems   Constitutional: Positive for diaphoresis, fatigue and fever  Negative for chills  HENT: Positive for congestion, postnasal drip, rhinorrhea, sinus pressure and sore throat  Negative for ear pain, sinus pain and voice change  Eyes: Negative for redness  Respiratory: Positive for cough and wheezing  Negative for chest tightness and shortness of breath  Gastrointestinal: Negative for abdominal pain, diarrhea, nausea and vomiting  Musculoskeletal: Negative for myalgias  Skin: Negative for rash  Neurological: Negative for headaches           Current Medications       Current Outpatient Medications:     AFLURIA QUADRIVALENT 0 5 ML JUAN PABLO, ADM 0 5ML IM UTD, Disp: , Rfl: 0    betamethasone, augmented, (DIPROLENE-AF) 0 05 % cream, , Disp: , Rfl: 2    calcipotriene (DOVONEX) 0 005 % cream, APPLY TO AFFECTED AREAS BID MONDAY-FRIDAY, Disp: , Rfl: 5    clotrimazole-betamethasone (LOTRISONE) 1-0 05 % cream, Apply topically 2 (two) times a day, Disp: 30 g, Rfl: 0    fluocinolone acetonide (DERMOTIC) 0 01 % otic oil, , Disp: , Rfl: 1    lisinopril (ZESTRIL) 20 mg tablet, Take 1 tablet (20 mg total) by mouth daily, Disp: 90 tablet, Rfl: 1    selenium sulfide (SELSUN) 2 5 % shampoo, Lather whole body for 10 minutes daily for 7 days then 3x/week for prevention, Disp: 120 mL, Rfl: 5    albuterol (VENTOLIN HFA) 90 mcg/act inhaler, Inhale 2 puffs every 4 (four) hours as needed for wheezing, Disp: 18 g, Rfl: 0    benzonatate (TESSALON) 200 MG capsule, Take 1 capsule (200 mg total) by mouth 3 (three) times a day as needed for cough, Disp: 20 capsule, Rfl: 0    Current Allergies     Allergies as of 03/01/2019    (No Known Allergies)            The following portions of the patient's history were reviewed and updated as appropriate: allergies, current medications, past family history, past medical history, past social history, past surgical history and problem list      Past Medical History:   Diagnosis Date    Hypertension        No past surgical history on file  Family History   Problem Relation Age of Onset    No Known Problems Father          Medications have been verified  Objective   /75   Pulse 94   Temp (!) 96 6 °F (35 9 °C) (Temporal)   Resp 15   Ht 5' 11" (1 803 m)   Wt (!) 144 kg (318 lb 3 2 oz)   SpO2 96%   BMI 44 38 kg/m²        Physical Exam     Physical Exam   Constitutional: He is oriented to person, place, and time  He appears well-developed and well-nourished  Non-toxic appearance  He does not appear ill  No distress  HENT:   Head: Normocephalic and atraumatic     Right Ear: Hearing, tympanic membrane, external ear and ear canal normal    Left Ear: Hearing, tympanic membrane, external ear and ear canal normal    Mouth/Throat: Uvula is midline, oropharynx is clear and moist and mucous membranes are normal  No tonsillar exudate  Eyes: Pupils are equal, round, and reactive to light  Cardiovascular: Normal rate, regular rhythm and normal heart sounds  Pulmonary/Chest: Effort normal and breath sounds normal  No respiratory distress  He has no wheezes  He has no rhonchi  Neurological: He is alert and oriented to person, place, and time  Skin: Skin is warm and dry  Psychiatric: He has a normal mood and affect   His behavior is normal

## 2019-03-01 NOTE — PATIENT INSTRUCTIONS
Albuterol inhaler as directed  Benzontate prescription as directed for cough  Afrin nasal spray as directed for sinus congestion  Plain Mucinex to thin mucous  Increase fluids  Follow up with PCP in 3-5 days  Proceed to  ER if symptoms worsen

## 2019-03-30 LAB — HBA1C MFR BLD HPLC: 5.8 %

## 2019-03-31 LAB
CHOLEST SERPL-MCNC: 194 MG/DL
CHOLEST/HDLC SERPL: 5.2 (CALC)
HDLC SERPL-MCNC: 37 MG/DL
LDLC SERPL CALC-MCNC: 137 MG/DL (CALC)
NONHDLC SERPL-MCNC: 157 MG/DL (CALC)
PSA SERPL-MCNC: 0.3 NG/ML
TRIGL SERPL-MCNC: 95 MG/DL

## 2019-04-11 ENCOUNTER — OFFICE VISIT (OUTPATIENT)
Dept: FAMILY MEDICINE CLINIC | Facility: CLINIC | Age: 44
End: 2019-04-11
Payer: COMMERCIAL

## 2019-04-11 VITALS
HEART RATE: 86 BPM | SYSTOLIC BLOOD PRESSURE: 132 MMHG | DIASTOLIC BLOOD PRESSURE: 84 MMHG | TEMPERATURE: 97.2 F | BODY MASS INDEX: 42.56 KG/M2 | HEIGHT: 71 IN | RESPIRATION RATE: 22 BRPM | WEIGHT: 304 LBS

## 2019-04-11 DIAGNOSIS — J06.9 ACUTE UPPER RESPIRATORY INFECTION: Primary | ICD-10-CM

## 2019-04-11 PROCEDURE — 99213 OFFICE O/P EST LOW 20 MIN: CPT | Performed by: NURSE PRACTITIONER

## 2019-04-11 PROCEDURE — 1036F TOBACCO NON-USER: CPT | Performed by: NURSE PRACTITIONER

## 2019-04-11 PROCEDURE — 3008F BODY MASS INDEX DOCD: CPT | Performed by: NURSE PRACTITIONER

## 2019-04-11 RX ORDER — CEFDINIR 300 MG/1
300 CAPSULE ORAL EVERY 12 HOURS SCHEDULED
Qty: 20 CAPSULE | Refills: 0 | Status: SHIPPED | OUTPATIENT
Start: 2019-04-11 | End: 2019-04-21

## 2019-04-11 RX ORDER — FLUTICASONE PROPIONATE 50 MCG
2 SPRAY, SUSPENSION (ML) NASAL DAILY
Qty: 16 G | Refills: 1 | Status: SHIPPED | OUTPATIENT
Start: 2019-04-11 | End: 2019-08-21 | Stop reason: ALTCHOICE

## 2019-08-21 ENCOUNTER — OFFICE VISIT (OUTPATIENT)
Dept: FAMILY MEDICINE CLINIC | Facility: CLINIC | Age: 44
End: 2019-08-21
Payer: COMMERCIAL

## 2019-08-21 VITALS
TEMPERATURE: 98 F | DIASTOLIC BLOOD PRESSURE: 82 MMHG | WEIGHT: 315 LBS | SYSTOLIC BLOOD PRESSURE: 132 MMHG | HEART RATE: 84 BPM | BODY MASS INDEX: 44.1 KG/M2 | HEIGHT: 71 IN | RESPIRATION RATE: 16 BRPM

## 2019-08-21 DIAGNOSIS — L08.9 POST-TRAUMATIC WOUND INFECTION: Primary | ICD-10-CM

## 2019-08-21 DIAGNOSIS — T14.8XXA POST-TRAUMATIC WOUND INFECTION: Primary | ICD-10-CM

## 2019-08-21 PROCEDURE — 1036F TOBACCO NON-USER: CPT | Performed by: FAMILY MEDICINE

## 2019-08-21 PROCEDURE — 99213 OFFICE O/P EST LOW 20 MIN: CPT | Performed by: FAMILY MEDICINE

## 2019-08-21 PROCEDURE — 3008F BODY MASS INDEX DOCD: CPT | Performed by: FAMILY MEDICINE

## 2019-08-21 RX ORDER — GUSELKUMAB 100 MG/ML
100 INJECTION SUBCUTANEOUS
COMMUNITY
Start: 2019-08-06

## 2019-08-21 RX ORDER — CEPHALEXIN 500 MG/1
500 CAPSULE ORAL EVERY 12 HOURS SCHEDULED
Qty: 20 CAPSULE | Refills: 0 | Status: SHIPPED | OUTPATIENT
Start: 2019-08-21 | End: 2019-08-31

## 2019-08-21 NOTE — PROGRESS NOTES
Assessment/Plan:    Problem List Items Addressed This Visit     None      Visit Diagnoses     Post-traumatic wound infection    -  Primary    Relevant Medications    mupirocin (BACTROBAN) 2 % ointment    cephalexin (KEFLEX) 500 mg capsule      wound dressed in office    BMI Counseling: Body mass index is 45 61 kg/m²  Discussed the patient's BMI with him  The BMI is above average  BMI counseling and education was provided to the patient  Nutrition recommendations include reducing portion sizes  There are no Patient Instructions on file for this visit  No follow-ups on file  Subjective:      Patient ID: Fortunato Moulton is a 37 y o  male  Chief Complaint   Patient presents with    leg ingury     wmcma       Pt is here this evening for a same day appt for leg pain    Pt states 4 days ago he was shoveling hector fell in a cement bin  He slipped and scraped his rt leg agagainst it  States he has been using vaslaine and not its tender and he sees puss      The following portions of the patient's history were reviewed and updated as appropriate: allergies, current medications, past family history, past medical history, past social history, past surgical history and problem list     Review of Systems   Constitutional: Negative for activity change, appetite change, chills, diaphoresis, fatigue, fever and unexpected weight change  HENT: Negative for congestion, dental problem, ear pain, mouth sores, sinus pressure, sinus pain, sore throat and trouble swallowing  Eyes: Negative for photophobia, discharge and itching  Respiratory: Negative for apnea, chest tightness and shortness of breath  Cardiovascular: Negative for chest pain, palpitations and leg swelling  Gastrointestinal: Negative for abdominal distention, abdominal pain, blood in stool, nausea and vomiting  Endocrine: Negative for cold intolerance, heat intolerance, polydipsia, polyphagia and polyuria     Genitourinary: Negative for difficulty urinating  Musculoskeletal: Negative for arthralgias  Skin: Positive for wound  Negative for color change  Neurological: Negative for dizziness, syncope, speech difficulty and headaches  Hematological: Negative for adenopathy  Psychiatric/Behavioral: Negative for agitation and behavioral problems  Current Outpatient Medications   Medication Sig Dispense Refill    lisinopril (ZESTRIL) 20 mg tablet Take 1 tablet (20 mg total) by mouth daily 90 tablet 1    metFORMIN (GLUCOPHAGE) 500 mg tablet Take 500 mg by mouth      TREMFYA subcutaneous injection       cephalexin (KEFLEX) 500 mg capsule Take 1 capsule (500 mg total) by mouth every 12 (twelve) hours for 10 days 20 capsule 0    mupirocin (BACTROBAN) 2 % ointment Apply topically 3 (three) times a day 30 g 1     No current facility-administered medications for this visit  Objective:    /82   Pulse 84   Temp 98 °F (36 7 °C)   Resp 16   Ht 5' 11" (1 803 m)   Wt (!) 148 kg (327 lb)   BMI 45 61 kg/m²        Physical Exam   Constitutional: He appears well-developed and well-nourished  No distress  HENT:   Head: Normocephalic and atraumatic  Right Ear: External ear normal    Left Ear: External ear normal    Nose: Nose normal    Mouth/Throat: Oropharynx is clear and moist  No oropharyngeal exudate  Eyes: Pupils are equal, round, and reactive to light  EOM are normal  Right eye exhibits no discharge  Left eye exhibits no discharge  No scleral icterus  Neck: No thyromegaly present  Cardiovascular: Normal rate and normal heart sounds  No murmur heard  Pulmonary/Chest: Effort normal and breath sounds normal  No respiratory distress  He has no wheezes  Abdominal: Soft  Bowel sounds are normal  He exhibits no distension and no mass  There is no tenderness  There is no rebound and no guarding  Musculoskeletal: Normal range of motion  Neurological: He is alert  He displays normal reflexes   Coordination normal    Skin: Skin is warm and dry  No rash noted  He is not diaphoretic  There is erythema  Red scrapped skin some puss   Psychiatric: He has a normal mood and affect  His behavior is normal    Nursing note and vitals reviewed               Buster Herrera DO

## 2019-10-20 DIAGNOSIS — I10 BENIGN ESSENTIAL HYPERTENSION: ICD-10-CM

## 2019-10-20 RX ORDER — LISINOPRIL 20 MG/1
TABLET ORAL
Qty: 21 TABLET | Refills: 0 | Status: SHIPPED | OUTPATIENT
Start: 2019-10-20 | End: 2019-11-18 | Stop reason: SDUPTHER

## 2019-11-18 DIAGNOSIS — I10 BENIGN ESSENTIAL HYPERTENSION: ICD-10-CM

## 2019-11-18 RX ORDER — LISINOPRIL 20 MG/1
TABLET ORAL
Qty: 14 TABLET | Refills: 0 | Status: SHIPPED | OUTPATIENT
Start: 2019-11-18 | End: 2019-12-03 | Stop reason: SDUPTHER

## 2019-12-03 DIAGNOSIS — I10 BENIGN ESSENTIAL HYPERTENSION: ICD-10-CM

## 2019-12-03 RX ORDER — LISINOPRIL 20 MG/1
20 TABLET ORAL DAILY
Qty: 30 TABLET | Refills: 0 | Status: SHIPPED | OUTPATIENT
Start: 2019-12-03 | End: 2019-12-10 | Stop reason: SDUPTHER

## 2019-12-03 NOTE — TELEPHONE ENCOUNTER
Dr Preeti Tavares like patient is scheduled for a NP establish care at [facility] office on 12/10  Should I remove you as PCP?

## 2019-12-10 ENCOUNTER — OFFICE VISIT (OUTPATIENT)
Dept: FAMILY MEDICINE CLINIC | Facility: CLINIC | Age: 44
End: 2019-12-10
Payer: COMMERCIAL

## 2019-12-10 VITALS
RESPIRATION RATE: 18 BRPM | HEART RATE: 88 BPM | SYSTOLIC BLOOD PRESSURE: 134 MMHG | OXYGEN SATURATION: 100 % | WEIGHT: 315 LBS | BODY MASS INDEX: 44.1 KG/M2 | HEIGHT: 71 IN | DIASTOLIC BLOOD PRESSURE: 84 MMHG

## 2019-12-10 DIAGNOSIS — R73.03 PREDIABETES: ICD-10-CM

## 2019-12-10 DIAGNOSIS — R53.83 FATIGUE, UNSPECIFIED TYPE: ICD-10-CM

## 2019-12-10 DIAGNOSIS — L40.9 PSORIASIS: ICD-10-CM

## 2019-12-10 DIAGNOSIS — E66.01 MORBID OBESITY (HCC): ICD-10-CM

## 2019-12-10 DIAGNOSIS — E55.9 VITAMIN D DEFICIENCY: ICD-10-CM

## 2019-12-10 DIAGNOSIS — I10 ESSENTIAL HYPERTENSION: Primary | ICD-10-CM

## 2019-12-10 PROCEDURE — 3079F DIAST BP 80-89 MM HG: CPT | Performed by: NURSE PRACTITIONER

## 2019-12-10 PROCEDURE — 1036F TOBACCO NON-USER: CPT | Performed by: NURSE PRACTITIONER

## 2019-12-10 PROCEDURE — 3008F BODY MASS INDEX DOCD: CPT | Performed by: NURSE PRACTITIONER

## 2019-12-10 PROCEDURE — 99214 OFFICE O/P EST MOD 30 MIN: CPT | Performed by: NURSE PRACTITIONER

## 2019-12-10 PROCEDURE — 3075F SYST BP GE 130 - 139MM HG: CPT | Performed by: NURSE PRACTITIONER

## 2019-12-10 RX ORDER — LISINOPRIL 20 MG/1
20 TABLET ORAL DAILY
Qty: 90 TABLET | Refills: 0 | Status: SHIPPED | OUTPATIENT
Start: 2019-12-10 | End: 2020-04-06 | Stop reason: SDUPTHER

## 2019-12-10 NOTE — PROGRESS NOTES
Assessment/Plan:      Diagnoses and all orders for this visit:    Essential hypertension  -     Comprehensive metabolic panel; Future  -     CBC and differential; Future  -     TSH, 3rd generation with Free T4 reflex; Future  -     Lipid Panel with Direct LDL reflex; Future  -     lisinopril (ZESTRIL) 20 mg tablet; Take 1 tablet (20 mg total) by mouth daily    /84  Continue lisinopril  Lab work ordered  Follow-up in 1 month for a BP check  Morbid obesity ([facility] [address] )  -     Comprehensive metabolic panel; Future  -     CBC and differential; Future  -     TSH, 3rd generation with Free T4 reflex; Future  -     Lipid Panel with Direct LDL reflex; Future  -     HEMOGLOBIN A1C W/ EAG ESTIMATION; Future    Patient instructed to continue to exercise on a regular basis and to eat a low carb diet  Patient encouraged to continue to follow-up with the bariatric specialist      Prediabetes  -     Comprehensive metabolic panel; Future  -     CBC and differential; Future  -     TSH, 3rd generation with Free T4 reflex; Future  -     Lipid Panel with Direct LDL reflex; Future  -     HEMOGLOBIN A1C W/ EAG ESTIMATION; Future  -     metFORMIN (GLUCOPHAGE) 500 mg tablet; Take 1 tablet (500 mg total) by mouth daily with dinner    Continue metformin  Continue low carb diet  Hemoglobin A1C ordered  Alittle dry skin noted on feet  Patient instructed to use moisturizer daily  Psoriasis  Patient encouraged to continue to follow-up with dermatology  Vitamin D deficiency  -     TSH, 3rd generation with Free T4 reflex; Future  -     Vitamin D 25 hydroxy; Future    Fatigue, unspecified type  Lab work ordered  Patient instructed to follow-up in 1 month or sooner prn  Subjective:     Patient ID: Clare Poon is a 53 y o  male  Patient is here to establish care  Patient reports that he takes lisinopril 20mg daily for HTN  Patient reports that he does not check his BP at home   Denies any chest pain, SOB, palpitations, dizziness, or Has  Patient reports that he takes metformin daily for prediabetes  Patient reports that he is trying to eat a low carb diet  Patient reports that he does strength training 2-3 days a week at the gym  Patient reports that he follows up with dermatology for psoriasis  Patient reports that he gets the tremfya injection every 8 weeks  Patient reports that he follows up with a bariatric specialist for medical management of obesity  Patient reports that he goes there yearly  Patient reports that the specialist was the one that started him on metformin  Patient reports that he was on a vitamin D supplement for vitamin D deficiency but finished it  Patient reports that he needs to have his vitamin D level checked  Review of Systems   Constitutional: Positive for fatigue  Negative for appetite change, chills and fever  HENT: Negative for congestion, ear pain, sore throat and trouble swallowing  Eyes: Negative for pain, discharge and redness  Respiratory: Negative for cough, shortness of breath and wheezing  Cardiovascular: Negative for chest pain, palpitations and leg swelling  Gastrointestinal: Negative for abdominal pain, blood in stool, diarrhea, nausea and vomiting  Genitourinary: Negative for dysuria, frequency, hematuria and urgency  Musculoskeletal: Negative for myalgias and neck pain  Skin: Negative for rash  Neurological: Negative for dizziness, seizures, syncope, light-headedness and headaches  Psychiatric/Behavioral: Negative for suicidal ideas  Denies any depression  Objective:     Physical Exam   Constitutional: He is oriented to person, place, and time  No distress  obese   HENT:   Right Ear: External ear normal    Left Ear: External ear normal    Mouth/Throat: Oropharynx is clear and moist    Tympanic membranes and ear canals wnl  Posterior pharynx wnl  Eyes: Pupils are equal, round, and reactive to light   Conjunctivae are normal    Neck: Normal range of motion  Cardiovascular: Normal rate, regular rhythm, normal heart sounds and intact distal pulses  Pulses are no weak pulses  Pulses:       Dorsalis pedis pulses are 2+ on the right side, and 2+ on the left side  No edema noted  Pulmonary/Chest: Effort normal and breath sounds normal  He has no wheezes  Abdominal: Soft  There is no tenderness  Musculoskeletal:   Gait wnl  Feet:   Right Foot:   Skin Integrity: Negative for ulcer, skin breakdown, erythema or warmth  Left Foot:   Skin Integrity: Negative for ulcer, skin breakdown, erythema or warmth  Lymphadenopathy:     He has no cervical adenopathy  Neurological: He is alert and oriented to person, place, and time  No cranial nerve deficit  Psychiatric: He has a normal mood and affect  Vitals reviewed  Patient's shoes and socks removed  Right Foot/Ankle   Right Foot Inspection  Skin Exam: skin normal and skin intact no warmth, no erythema, no maceration, no abnormal color, no pre-ulcer and no ulcer                          Toe Exam: ROM and strength within normal limitsno swelling, no tenderness, erythema and  no right toe deformity  Sensory       Monofilament testing: intact  Vascular  Capillary refills: < 3 seconds  The right DP pulse is 2+  Left Foot/Ankle  Left Foot Inspection  Skin Exam: skin normal and skin intactno warmth, no erythema, no maceration, normal color, no pre-ulcer and no ulcer                         Toe Exam: ROM and strength within normal limitsno swelling, no tenderness, no erythema and no left toe deformity                   Sensory       Monofilament: intact  Vascular  Capillary refills: < 3 seconds  The left DP pulse is 2+  Assign Risk Category:  No deformity present; No loss of protective sensation;  No weak pulses       Risk: 0

## 2020-01-21 ENCOUNTER — APPOINTMENT (OUTPATIENT)
Dept: LAB | Facility: CLINIC | Age: 45
End: 2020-01-21
Payer: COMMERCIAL

## 2020-01-21 DIAGNOSIS — E55.9 VITAMIN D DEFICIENCY: ICD-10-CM

## 2020-01-21 DIAGNOSIS — I10 ESSENTIAL HYPERTENSION: ICD-10-CM

## 2020-01-21 DIAGNOSIS — Z13.89 SCREENING FOR CARDIOVASCULAR, RESPIRATORY, AND GENITOURINARY DISEASES: ICD-10-CM

## 2020-01-21 DIAGNOSIS — Z13.6 SCREENING FOR CARDIOVASCULAR, RESPIRATORY, AND GENITOURINARY DISEASES: ICD-10-CM

## 2020-01-21 DIAGNOSIS — Z12.5 PROSTATE CANCER SCREENING: ICD-10-CM

## 2020-01-21 DIAGNOSIS — I10 BENIGN ESSENTIAL HYPERTENSION: ICD-10-CM

## 2020-01-21 DIAGNOSIS — E66.01 MORBID OBESITY (HCC): ICD-10-CM

## 2020-01-21 DIAGNOSIS — Z13.83 SCREENING FOR CARDIOVASCULAR, RESPIRATORY, AND GENITOURINARY DISEASES: ICD-10-CM

## 2020-01-21 DIAGNOSIS — R73.03 PREDIABETES: ICD-10-CM

## 2020-01-21 LAB
25(OH)D3 SERPL-MCNC: 10.5 NG/ML (ref 30–100)
ALBUMIN SERPL BCP-MCNC: 4.1 G/DL (ref 3.5–5)
ALP SERPL-CCNC: 75 U/L (ref 46–116)
ALT SERPL W P-5'-P-CCNC: 56 U/L (ref 12–78)
ANION GAP SERPL CALCULATED.3IONS-SCNC: 6 MMOL/L (ref 4–13)
AST SERPL W P-5'-P-CCNC: 26 U/L (ref 5–45)
BASOPHILS # BLD AUTO: 0.08 THOUSANDS/ΜL (ref 0–0.1)
BASOPHILS NFR BLD AUTO: 1 % (ref 0–1)
BILIRUB SERPL-MCNC: 0.48 MG/DL (ref 0.2–1)
BUN SERPL-MCNC: 18 MG/DL (ref 5–25)
CALCIUM SERPL-MCNC: 9.6 MG/DL (ref 8.3–10.1)
CHLORIDE SERPL-SCNC: 105 MMOL/L (ref 100–108)
CHOLEST SERPL-MCNC: 188 MG/DL (ref 50–200)
CO2 SERPL-SCNC: 26 MMOL/L (ref 21–32)
CREAT SERPL-MCNC: 0.97 MG/DL (ref 0.6–1.3)
EOSINOPHIL # BLD AUTO: 0.41 THOUSAND/ΜL (ref 0–0.61)
EOSINOPHIL NFR BLD AUTO: 4 % (ref 0–6)
ERYTHROCYTE [DISTWIDTH] IN BLOOD BY AUTOMATED COUNT: 13.5 % (ref 11.6–15.1)
EST. AVERAGE GLUCOSE BLD GHB EST-MCNC: 131 MG/DL
GFR SERPL CREATININE-BSD FRML MDRD: 93 ML/MIN/1.73SQ M
GLUCOSE P FAST SERPL-MCNC: 138 MG/DL (ref 65–99)
HBA1C MFR BLD: 6.2 % (ref 4.2–6.3)
HCT VFR BLD AUTO: 45.3 % (ref 36.5–49.3)
HDLC SERPL-MCNC: 39 MG/DL
HGB BLD-MCNC: 15.3 G/DL (ref 12–17)
IMM GRANULOCYTES # BLD AUTO: 0.03 THOUSAND/UL (ref 0–0.2)
IMM GRANULOCYTES NFR BLD AUTO: 0 % (ref 0–2)
LDLC SERPL CALC-MCNC: 127 MG/DL (ref 0–100)
LYMPHOCYTES # BLD AUTO: 3.36 THOUSANDS/ΜL (ref 0.6–4.47)
LYMPHOCYTES NFR BLD AUTO: 33 % (ref 14–44)
MCH RBC QN AUTO: 28.2 PG (ref 26.8–34.3)
MCHC RBC AUTO-ENTMCNC: 33.8 G/DL (ref 31.4–37.4)
MCV RBC AUTO: 83 FL (ref 82–98)
MONOCYTES # BLD AUTO: 0.84 THOUSAND/ΜL (ref 0.17–1.22)
MONOCYTES NFR BLD AUTO: 8 % (ref 4–12)
NEUTROPHILS # BLD AUTO: 5.49 THOUSANDS/ΜL (ref 1.85–7.62)
NEUTS SEG NFR BLD AUTO: 54 % (ref 43–75)
NRBC BLD AUTO-RTO: 0 /100 WBCS
PLATELET # BLD AUTO: 272 THOUSANDS/UL (ref 149–390)
PMV BLD AUTO: 9.8 FL (ref 8.9–12.7)
POTASSIUM SERPL-SCNC: 4.6 MMOL/L (ref 3.5–5.3)
PROT SERPL-MCNC: 7.9 G/DL (ref 6.4–8.2)
RBC # BLD AUTO: 5.43 MILLION/UL (ref 3.88–5.62)
SODIUM SERPL-SCNC: 137 MMOL/L (ref 136–145)
TRIGL SERPL-MCNC: 110 MG/DL
TSH SERPL DL<=0.05 MIU/L-ACNC: 2.29 UIU/ML (ref 0.36–3.74)
WBC # BLD AUTO: 10.21 THOUSAND/UL (ref 4.31–10.16)

## 2020-01-21 PROCEDURE — 82306 VITAMIN D 25 HYDROXY: CPT

## 2020-01-21 PROCEDURE — 84443 ASSAY THYROID STIM HORMONE: CPT

## 2020-01-21 PROCEDURE — 80053 COMPREHEN METABOLIC PANEL: CPT

## 2020-01-21 PROCEDURE — 80061 LIPID PANEL: CPT

## 2020-01-21 PROCEDURE — 36415 COLL VENOUS BLD VENIPUNCTURE: CPT

## 2020-01-21 PROCEDURE — 83036 HEMOGLOBIN GLYCOSYLATED A1C: CPT

## 2020-01-21 PROCEDURE — 85025 COMPLETE CBC W/AUTO DIFF WBC: CPT

## 2020-01-28 ENCOUNTER — OFFICE VISIT (OUTPATIENT)
Dept: FAMILY MEDICINE CLINIC | Facility: CLINIC | Age: 45
End: 2020-01-28
Payer: COMMERCIAL

## 2020-01-28 ENCOUNTER — TELEPHONE (OUTPATIENT)
Dept: FAMILY MEDICINE CLINIC | Facility: CLINIC | Age: 45
End: 2020-01-28

## 2020-01-28 VITALS
OXYGEN SATURATION: 98 % | HEIGHT: 71 IN | RESPIRATION RATE: 18 BRPM | WEIGHT: 315 LBS | HEART RATE: 88 BPM | BODY MASS INDEX: 44.1 KG/M2 | DIASTOLIC BLOOD PRESSURE: 80 MMHG | TEMPERATURE: 98.6 F | SYSTOLIC BLOOD PRESSURE: 136 MMHG

## 2020-01-28 DIAGNOSIS — N48.30 PAINFUL PENILE ERECTION: ICD-10-CM

## 2020-01-28 DIAGNOSIS — D72.829 LEUKOCYTOSIS, UNSPECIFIED TYPE: ICD-10-CM

## 2020-01-28 DIAGNOSIS — R73.03 PREDIABETES: ICD-10-CM

## 2020-01-28 DIAGNOSIS — R35.0 URINARY FREQUENCY: Primary | ICD-10-CM

## 2020-01-28 DIAGNOSIS — E78.5 HYPERLIPIDEMIA, UNSPECIFIED HYPERLIPIDEMIA TYPE: ICD-10-CM

## 2020-01-28 DIAGNOSIS — I10 ESSENTIAL HYPERTENSION: ICD-10-CM

## 2020-01-28 DIAGNOSIS — E55.9 VITAMIN D DEFICIENCY: ICD-10-CM

## 2020-01-28 DIAGNOSIS — E66.01 MORBID OBESITY (HCC): ICD-10-CM

## 2020-01-28 PROCEDURE — 99214 OFFICE O/P EST MOD 30 MIN: CPT | Performed by: NURSE PRACTITIONER

## 2020-01-28 PROCEDURE — 1036F TOBACCO NON-USER: CPT | Performed by: NURSE PRACTITIONER

## 2020-01-28 PROCEDURE — 3008F BODY MASS INDEX DOCD: CPT | Performed by: NURSE PRACTITIONER

## 2020-01-28 PROCEDURE — 3079F DIAST BP 80-89 MM HG: CPT | Performed by: NURSE PRACTITIONER

## 2020-01-28 PROCEDURE — 3075F SYST BP GE 130 - 139MM HG: CPT | Performed by: NURSE PRACTITIONER

## 2020-01-28 RX ORDER — ERGOCALCIFEROL 1.25 MG/1
50000 CAPSULE ORAL WEEKLY
Qty: 12 CAPSULE | Refills: 0 | Status: SHIPPED | OUTPATIENT
Start: 2020-01-28 | End: 2020-04-06 | Stop reason: ALTCHOICE

## 2020-01-28 NOTE — TELEPHONE ENCOUNTER
Ping Wooten called the urologist that he was referred to and they cant get him in for a while  He was told to call back and find out other urologist name that Naldo Paulson recommended       Call pt with that info

## 2020-01-28 NOTE — PROGRESS NOTES
Assessment/Plan:      Diagnoses and all orders for this visit:    Urinary frequency  -     UA/M w/rflx Culture, Routine  -     PSA, Total Screen; Future  -     Ambulatory referral to Urology; Future    Urinalysis and PSA ordered  Will follow-up results with the patient  Patient referred to urology for further evaluation  Painful penile erection  -     Ambulatory referral to Urology; Future  Patient referred to urology for further evaluation  Essential hypertension  -     Lipid Panel with Direct LDL reflex; Future  -     Comprehensive metabolic panel; Future    Continue lisinopril  Morbid obesity ([address] )  -     Comprehensive metabolic panel; Future  -     CBC and differential; Future  -     Comprehensive metabolic panel; Future    Patient instructed to exercise on a regular basis and to eat a low fat diet  Prediabetes  -     Comprehensive metabolic panel; Future  -     Comprehensive metabolic panel; Future    Hemoglobin A1C 6 2  Fasting blood sugar of 138  Continue metformin  Repeat CMP ordered  Vitamin D deficiency  -     ergocalciferol (VITAMIN D2) 50,000 units; Take 1 capsule (50,000 Units total) by mouth once a week  -     Vitamin D 25 hydroxy; Future    Vitamin D level 10 5  Vitamin D 50,000 units weekly prescribed  Medication information and side effects reviewed  Repeat vitamin D level ordered  Hyperlipidemia, unspecified hyperlipidemia type  -     Lipid Panel with Direct LDL reflex; Future    Total cholesterol 188    Low fat diet reviewed  Repeat lipid panel ordered  Leukocytosis, unspecified type  -     CBC and differential; Future  WBC slightly elevated  Repeat CBC ordered  Lab work ordered  Patient instructed to follow-up in 3 months or sooner prn  Subjective:     Patient ID: Toni Young is a 53 y o  male  Patient is here for a follow-up for chronic medical conditions and to review lab results       Patient reports that he gets pain at the base of his penis when he has an erection for the past couple of weeks  Denies any fever  Patient reports alittle increased urinary frequency  Denies any urinary urgency, hesitancy, hematuria, dysuria, penile discharge, or pelvic pain  Patient is unable to give a urine sample  Patient is here for a follow-up for hypertension  Patient reports that he takes lisinopril daily  Denies any chest pain, SOB, dizziness, or Has  Patient is here for a follow-up for obesity  Patient reports that he has been trying to eat a healthy diet for the past few weeks  Patient reports that he does strength training 2-4 days a week  Patient is here for a follow-up for prediabetes  Patient reports that he takes metformin daily  Denies any nausea, vomiting, dizziness, or Has  Review of Systems   Constitutional: Negative for appetite change, chills and fever  HENT: Negative for congestion, ear pain and sore throat  Eyes: Negative for pain, discharge and redness  Respiratory: Negative for cough, shortness of breath and wheezing  Cardiovascular: Negative for chest pain, palpitations and leg swelling  Gastrointestinal: Negative for abdominal pain, diarrhea, nausea and vomiting  Genitourinary:        As noted in HPI  Skin: Negative for rash  Neurological: Negative for dizziness, tremors, seizures, light-headedness and headaches  Psychiatric/Behavioral: Negative for suicidal ideas  Denies any depression  Objective:     Physical Exam   Constitutional: He is oriented to person, place, and time  No distress  obese   HENT:   Right Ear: External ear normal    Left Ear: External ear normal    Mouth/Throat: Oropharynx is clear and moist    Eyes: Pupils are equal, round, and reactive to light  Conjunctivae are normal    Cardiovascular: Normal rate, regular rhythm and normal heart sounds  Radial pulses +2 bilaterally  No edema noted  Pulmonary/Chest: Effort normal and breath sounds normal  He has no wheezes  Abdominal: Soft  There is no tenderness  Genitourinary: Penis normal    Musculoskeletal:   Gait wnl  Lymphadenopathy: No inguinal adenopathy noted on the right or left side  Neurological: He is alert and oriented to person, place, and time  Skin: No rash noted  Psychiatric: He has a normal mood and affect  Vitals reviewed  BMI Counseling: Body mass index is 48 4 kg/m²  The BMI is above normal  Nutrition recommendations include encouraging healthy choices of fruits and vegetables, consuming healthier snacks and reducing intake of cholesterol  Exercise recommendations include exercising 3-5 times per week

## 2020-04-02 ENCOUNTER — TELEPHONE (OUTPATIENT)
Dept: UROLOGY | Facility: CLINIC | Age: 45
End: 2020-04-02

## 2020-04-06 ENCOUNTER — TELEMEDICINE (OUTPATIENT)
Dept: FAMILY MEDICINE CLINIC | Facility: CLINIC | Age: 45
End: 2020-04-06
Payer: COMMERCIAL

## 2020-04-06 VITALS — SYSTOLIC BLOOD PRESSURE: 131 MMHG | DIASTOLIC BLOOD PRESSURE: 81 MMHG | HEART RATE: 88 BPM

## 2020-04-06 DIAGNOSIS — E55.9 VITAMIN D DEFICIENCY: ICD-10-CM

## 2020-04-06 DIAGNOSIS — R73.03 PREDIABETES: ICD-10-CM

## 2020-04-06 DIAGNOSIS — I10 ESSENTIAL HYPERTENSION: Primary | ICD-10-CM

## 2020-04-06 PROCEDURE — 99213 OFFICE O/P EST LOW 20 MIN: CPT | Performed by: NURSE PRACTITIONER

## 2020-04-06 RX ORDER — MELATONIN
1000 DAILY
Qty: 30 TABLET | Refills: 0
Start: 2020-04-06 | End: 2021-08-27

## 2020-04-06 RX ORDER — LISINOPRIL 20 MG/1
20 TABLET ORAL DAILY
Qty: 90 TABLET | Refills: 0 | Status: SHIPPED | OUTPATIENT
Start: 2020-04-06 | End: 2020-08-07 | Stop reason: SDUPTHER

## 2020-07-24 DIAGNOSIS — R73.03 PREDIABETES: ICD-10-CM

## 2020-08-07 DIAGNOSIS — I10 ESSENTIAL HYPERTENSION: ICD-10-CM

## 2020-08-07 RX ORDER — LISINOPRIL 20 MG/1
20 TABLET ORAL DAILY
Qty: 90 TABLET | Refills: 0 | Status: SHIPPED | OUTPATIENT
Start: 2020-08-07 | End: 2020-08-18 | Stop reason: SDUPTHER

## 2020-08-07 NOTE — TELEPHONE ENCOUNTER
Patient called for a refill  I did speak with him and he is having his labs done tomorrow and did schedule a virtual follow up visit

## 2020-08-11 ENCOUNTER — APPOINTMENT (OUTPATIENT)
Dept: LAB | Facility: CLINIC | Age: 45
End: 2020-08-11
Payer: COMMERCIAL

## 2020-08-11 DIAGNOSIS — E66.01 MORBID OBESITY (HCC): ICD-10-CM

## 2020-08-11 DIAGNOSIS — R73.03 PREDIABETES: Primary | ICD-10-CM

## 2020-08-11 DIAGNOSIS — R35.0 URINARY FREQUENCY: ICD-10-CM

## 2020-08-11 DIAGNOSIS — E55.9 VITAMIN D DEFICIENCY: ICD-10-CM

## 2020-08-11 DIAGNOSIS — E78.5 HYPERLIPIDEMIA, UNSPECIFIED HYPERLIPIDEMIA TYPE: ICD-10-CM

## 2020-08-11 DIAGNOSIS — D72.829 LEUKOCYTOSIS, UNSPECIFIED TYPE: ICD-10-CM

## 2020-08-11 DIAGNOSIS — I10 ESSENTIAL HYPERTENSION: ICD-10-CM

## 2020-08-11 LAB
25(OH)D3 SERPL-MCNC: 18.6 NG/ML (ref 30–100)
ALBUMIN SERPL BCP-MCNC: 4.1 G/DL (ref 3.5–5)
ALP SERPL-CCNC: 78 U/L (ref 46–116)
ALT SERPL W P-5'-P-CCNC: 54 U/L (ref 12–78)
ANION GAP SERPL CALCULATED.3IONS-SCNC: 5 MMOL/L (ref 4–13)
AST SERPL W P-5'-P-CCNC: 25 U/L (ref 5–45)
BASOPHILS # BLD AUTO: 0.09 THOUSANDS/ΜL (ref 0–0.1)
BASOPHILS NFR BLD AUTO: 1 % (ref 0–1)
BILIRUB SERPL-MCNC: 0.6 MG/DL (ref 0.2–1)
BUN SERPL-MCNC: 21 MG/DL (ref 5–25)
CALCIUM SERPL-MCNC: 9.2 MG/DL (ref 8.3–10.1)
CHLORIDE SERPL-SCNC: 104 MMOL/L (ref 100–108)
CHOLEST SERPL-MCNC: 201 MG/DL (ref 50–200)
CO2 SERPL-SCNC: 27 MMOL/L (ref 21–32)
CREAT SERPL-MCNC: 0.94 MG/DL (ref 0.6–1.3)
EOSINOPHIL # BLD AUTO: 0.34 THOUSAND/ΜL (ref 0–0.61)
EOSINOPHIL NFR BLD AUTO: 4 % (ref 0–6)
ERYTHROCYTE [DISTWIDTH] IN BLOOD BY AUTOMATED COUNT: 14 % (ref 11.6–15.1)
GFR SERPL CREATININE-BSD FRML MDRD: 97 ML/MIN/1.73SQ M
GLUCOSE P FAST SERPL-MCNC: 151 MG/DL (ref 65–99)
HCT VFR BLD AUTO: 46.2 % (ref 36.5–49.3)
HDLC SERPL-MCNC: 41 MG/DL
HGB BLD-MCNC: 15.2 G/DL (ref 12–17)
IMM GRANULOCYTES # BLD AUTO: 0.03 THOUSAND/UL (ref 0–0.2)
IMM GRANULOCYTES NFR BLD AUTO: 0 % (ref 0–2)
LDLC SERPL CALC-MCNC: 118 MG/DL (ref 0–100)
LYMPHOCYTES # BLD AUTO: 3.28 THOUSANDS/ΜL (ref 0.6–4.47)
LYMPHOCYTES NFR BLD AUTO: 34 % (ref 14–44)
MCH RBC QN AUTO: 27.9 PG (ref 26.8–34.3)
MCHC RBC AUTO-ENTMCNC: 32.9 G/DL (ref 31.4–37.4)
MCV RBC AUTO: 85 FL (ref 82–98)
MONOCYTES # BLD AUTO: 0.83 THOUSAND/ΜL (ref 0.17–1.22)
MONOCYTES NFR BLD AUTO: 9 % (ref 4–12)
NEUTROPHILS # BLD AUTO: 5.19 THOUSANDS/ΜL (ref 1.85–7.62)
NEUTS SEG NFR BLD AUTO: 52 % (ref 43–75)
NRBC BLD AUTO-RTO: 0 /100 WBCS
PLATELET # BLD AUTO: 251 THOUSANDS/UL (ref 149–390)
PMV BLD AUTO: 9.7 FL (ref 8.9–12.7)
POTASSIUM SERPL-SCNC: 4.6 MMOL/L (ref 3.5–5.3)
PROT SERPL-MCNC: 8.1 G/DL (ref 6.4–8.2)
PSA SERPL-MCNC: 0.2 NG/ML (ref 0–4)
RBC # BLD AUTO: 5.45 MILLION/UL (ref 3.88–5.62)
SODIUM SERPL-SCNC: 136 MMOL/L (ref 136–145)
TRIGL SERPL-MCNC: 209 MG/DL
WBC # BLD AUTO: 9.76 THOUSAND/UL (ref 4.31–10.16)

## 2020-08-11 PROCEDURE — 82306 VITAMIN D 25 HYDROXY: CPT

## 2020-08-11 PROCEDURE — 80061 LIPID PANEL: CPT

## 2020-08-11 PROCEDURE — 36415 COLL VENOUS BLD VENIPUNCTURE: CPT

## 2020-08-11 PROCEDURE — 80053 COMPREHEN METABOLIC PANEL: CPT

## 2020-08-11 PROCEDURE — G0103 PSA SCREENING: HCPCS

## 2020-08-11 PROCEDURE — 85025 COMPLETE CBC W/AUTO DIFF WBC: CPT

## 2020-08-18 ENCOUNTER — TELEMEDICINE (OUTPATIENT)
Dept: FAMILY MEDICINE CLINIC | Facility: CLINIC | Age: 45
End: 2020-08-18
Payer: COMMERCIAL

## 2020-08-18 VITALS — SYSTOLIC BLOOD PRESSURE: 124 MMHG | TEMPERATURE: 97.9 F | DIASTOLIC BLOOD PRESSURE: 71 MMHG

## 2020-08-18 DIAGNOSIS — E55.9 VITAMIN D DEFICIENCY: ICD-10-CM

## 2020-08-18 DIAGNOSIS — R73.01 ELEVATED FASTING BLOOD SUGAR: ICD-10-CM

## 2020-08-18 DIAGNOSIS — L40.9 PSORIASIS: ICD-10-CM

## 2020-08-18 DIAGNOSIS — R73.03 PREDIABETES: Primary | ICD-10-CM

## 2020-08-18 DIAGNOSIS — I10 ESSENTIAL HYPERTENSION: ICD-10-CM

## 2020-08-18 DIAGNOSIS — E78.1 HYPERTRIGLYCERIDEMIA: ICD-10-CM

## 2020-08-18 DIAGNOSIS — E66.01 MORBID OBESITY (HCC): ICD-10-CM

## 2020-08-18 PROCEDURE — 3044F HG A1C LEVEL LT 7.0%: CPT | Performed by: NURSE PRACTITIONER

## 2020-08-18 PROCEDURE — 3074F SYST BP LT 130 MM HG: CPT | Performed by: NURSE PRACTITIONER

## 2020-08-18 PROCEDURE — 3078F DIAST BP <80 MM HG: CPT | Performed by: NURSE PRACTITIONER

## 2020-08-18 PROCEDURE — 4010F ACE/ARB THERAPY RXD/TAKEN: CPT | Performed by: NURSE PRACTITIONER

## 2020-08-18 PROCEDURE — 1036F TOBACCO NON-USER: CPT | Performed by: NURSE PRACTITIONER

## 2020-08-18 PROCEDURE — 99214 OFFICE O/P EST MOD 30 MIN: CPT | Performed by: NURSE PRACTITIONER

## 2020-08-18 RX ORDER — ERGOCALCIFEROL 1.25 MG/1
50000 CAPSULE ORAL WEEKLY
Qty: 12 CAPSULE | Refills: 0 | Status: SHIPPED | OUTPATIENT
Start: 2020-08-18 | End: 2021-02-10 | Stop reason: ALTCHOICE

## 2020-08-18 RX ORDER — LISINOPRIL 20 MG/1
20 TABLET ORAL DAILY
Qty: 90 TABLET | Refills: 1 | Status: SHIPPED | OUTPATIENT
Start: 2020-08-18 | End: 2020-12-28 | Stop reason: SDUPTHER

## 2020-08-18 NOTE — PROGRESS NOTES
Virtual Regular Visit      Assessment/Plan:    Problem List Items Addressed This Visit        Cardiovascular and Mediastinum    Essential hypertension    Relevant Medications    lisinopril (ZESTRIL) 20 mg tablet    /71  Continue lisinopril 20mg daily  Musculoskeletal and Integument    Psoriasis  Continue to follow-up with dermatology  Other    Hypertriglyceridemia  Triglycerides are 209  Patient instructed to eat a low fat/low carb diet  Patient reports that he wants to work on his diet before starting a new medication  Repeat lipid panel in 3 months  Morbid obesity ([address] )  Patient instructed to eat a healthy low fat/low carb diet and to exercise on a regular basis  Prediabetes - Primary    Relevant Orders    HEMOGLOBIN A1C W/ EAG ESTIMATION    Fasting blood sugar is 151  Patient is overdue for a hemoglobin A1C  Hemoglobin A1C ordered  Will follow-up results with the patient  Continue metformin for now  Low carb diet reviewed  Vitamin D deficiency    Relevant Medications    ergocalciferol (VITAMIN D2) 50,000 units    Vitamin D level is 18 6  Vitamin D 50,000 units weekly prescribed  Medication information and side effects reviewed  Repeat vitamin D level in 3 months  Elevated fasting blood sugar    Relevant Orders    HEMOGLOBIN A1C W/ EAG ESTIMATION    Reviewed lab results with the patient  Patient instructed to follow-up in 3 months or sooner prn  Reason for visit is   Chief Complaint   Patient presents with    Virtual Regular Visit        Encounter provider LARS Melara    Provider located at [address] [place] [id number]      Recent Visits  No visits were found meeting these conditions     Showing recent visits within past 7 days and meeting all other requirements     Today's Visits  Date Type Provider Dept   08/18/20 Telemedicine Roxana Mckeon 62 Showing today's visits and meeting all other requirements     Future Appointments  No visits were found meeting these conditions  Showing future appointments within next 150 days and meeting all other requirements        The patient was identified by name and date of birth  Layla Redding was informed that this is a telemedicine visit and that the visit is being conducted through [facility] and patient was informed that this is not a secure, HIPAA-complaint platform  He agrees to proceed     My office door was closed  No one else was in the room  He acknowledged consent and understanding of privacy and security of the video platform  The patient has agreed to participate and understands they can discontinue the visit at any time  Patient is aware this is a billable service  Subjective  Layla Redding is a 53 y o  male    Patient is here for a follow-up for chronic medical conditions  Patient reports that he feels well  Patient is here for a follow-up for prediabetes  Patient reports that he recently started watching his carb intake gain  Patient reports that he is taking the metformin daily  Denies any Has, dizziness, nausea, or vomiting  Patient is here for a follow-up for HTN  Denies any chest pain, SOB, palpitations, dizziness, or Has  Patient reports that he takes lisinopril daily  Patient reports that his BP is usually 130s/70s-80s at home  Patient follows up with dermatology for psoriasis  Patient denies anymore increased urinary frequency  Denies any urinary urgency, hematuria, or dysuria          Past Medical History:   Diagnosis Date    Hypertension        Past Surgical History:   Procedure Laterality Date    WISDOM TOOTH EXTRACTION         Current Outpatient Medications   Medication Sig Dispense Refill    cholecalciferol (VITAMIN D3) 1,000 units tablet Take 1 tablet (1,000 Units total) by mouth daily 30 tablet 0    ergocalciferol (VITAMIN D2) 50,000 units Take 1 capsule (50,000 Units total) by mouth once a week 12 capsule 0    lisinopril (ZESTRIL) 20 mg tablet Take 1 tablet (20 mg total) by mouth daily 90 tablet 1    metFORMIN (GLUCOPHAGE) 500 mg tablet Take 1 tablet (500 mg total) by mouth daily with dinner 90 tablet 0    TREMFYA subcutaneous injection        No current facility-administered medications for this visit  No Known Allergies    Review of Systems   Constitutional: Negative for appetite change, chills and fever  HENT: Negative for congestion, ear pain and sore throat  Eyes: Negative for pain, discharge and redness  Respiratory: Negative for cough, chest tightness, shortness of breath and wheezing  Cardiovascular: Negative for chest pain, palpitations and leg swelling  Gastrointestinal: Negative for abdominal pain, diarrhea, nausea and vomiting  Genitourinary: Negative for dysuria, frequency, hematuria and urgency  Musculoskeletal: Negative for myalgias and neck pain  Skin:        As noted in HPI  Neurological: Negative for dizziness, seizures, syncope, light-headedness and headaches  Psychiatric/Behavioral: Negative for suicidal ideas  Denies any depression  Video Exam    Vitals:    08/18/20 1404   BP: 124/71   Temp: 97 9 °F (36 6 °C)       Physical Exam  Constitutional:       General: He is not in acute distress  Appearance: He is not ill-appearing or diaphoretic  HENT:      Right Ear: External ear normal       Left Ear: External ear normal    Pulmonary:      Effort: Pulmonary effort is normal  No respiratory distress  Neurological:      Mental Status: He is alert and oriented to person, place, and time  Psychiatric:         Mood and Affect: Mood normal           I spent 20 minutes directly with the patient during this visit      VIRTUAL VISIT DISCLAIMER    Oliver Parra acknowledges that he has consented to an online visit or consultation   He understands that the online visit is based solely on information provided by him, and that, in the absence of a face-to-face physical evaluation by the physician, the diagnosis he receives is both limited and provisional in terms of accuracy and completeness  This is not intended to replace a full medical face-to-face evaluation by the physician  Raul Enriquez understands and accepts these terms  BMI Counseling: There is no height or weight on file to calculate BMI  The BMI is above normal  Nutrition recommendations include 3-5 servings of fruits/vegetables daily, consuming healthier snacks, reducing intake of saturated fat and trans fat and reducing intake of cholesterol  Exercise recommendations include exercising 3-5 times per week

## 2020-08-21 DIAGNOSIS — R73.03 PREDIABETES: ICD-10-CM

## 2020-09-06 ENCOUNTER — APPOINTMENT (OUTPATIENT)
Dept: LAB | Facility: CLINIC | Age: 45
End: 2020-09-06
Payer: COMMERCIAL

## 2020-09-06 DIAGNOSIS — R73.03 PREDIABETES: ICD-10-CM

## 2020-09-06 DIAGNOSIS — R73.01 ELEVATED FASTING BLOOD SUGAR: ICD-10-CM

## 2020-09-06 LAB
EST. AVERAGE GLUCOSE BLD GHB EST-MCNC: 143 MG/DL
HBA1C MFR BLD: 6.6 %

## 2020-09-06 PROCEDURE — 36415 COLL VENOUS BLD VENIPUNCTURE: CPT

## 2020-09-06 PROCEDURE — 83036 HEMOGLOBIN GLYCOSYLATED A1C: CPT

## 2020-10-26 DIAGNOSIS — R73.03 PREDIABETES: ICD-10-CM

## 2020-11-05 DIAGNOSIS — E55.9 VITAMIN D DEFICIENCY: ICD-10-CM

## 2020-11-06 DIAGNOSIS — E55.9 VITAMIN D DEFICIENCY: Primary | ICD-10-CM

## 2020-11-06 RX ORDER — ERGOCALCIFEROL 1.25 MG/1
CAPSULE ORAL
Qty: 12 CAPSULE | Refills: 0 | OUTPATIENT
Start: 2020-11-06

## 2020-12-28 DIAGNOSIS — I10 ESSENTIAL HYPERTENSION: ICD-10-CM

## 2020-12-28 RX ORDER — LISINOPRIL 20 MG/1
20 TABLET ORAL DAILY
Qty: 90 TABLET | Refills: 0 | Status: SHIPPED | OUTPATIENT
Start: 2020-12-28 | End: 2021-04-12 | Stop reason: SDUPTHER

## 2021-01-29 DIAGNOSIS — R73.03 PREDIABETES: ICD-10-CM

## 2021-02-03 ENCOUNTER — TELEPHONE (OUTPATIENT)
Dept: FAMILY MEDICINE CLINIC | Facility: CLINIC | Age: 46
End: 2021-02-03

## 2021-02-03 DIAGNOSIS — R73.03 PREDIABETES: ICD-10-CM

## 2021-02-03 NOTE — TELEPHONE ENCOUNTER
----- Message from Shahbaz Schmitt sent at 2/3/2021  7:33 AM EST -----  Regarding: FW: Prescription Question  Contact: [phone]    ----- Message -----  From: Dannie Blanco  Sent: 2/2/2021   3:17 PM EST  To: [id number]  Subject: Prescription Question                            Rachele Kraus  Looks like the office is closed today, so I'm going to message my question to you  A few days ago I put a request in through the [address] [place] [address]  [employer] [facility]'s fred for a refill of my Metformin  I called the pharmacy, and it doesn't appear that it was ever placed for me  Is it possible to get a refill on my prescription? I ran out four days ago      Thanks  Yoseph Starkey

## 2021-02-10 ENCOUNTER — TELEMEDICINE (OUTPATIENT)
Dept: FAMILY MEDICINE CLINIC | Facility: CLINIC | Age: 46
End: 2021-02-10
Payer: COMMERCIAL

## 2021-02-10 DIAGNOSIS — E66.01 MORBID OBESITY (HCC): ICD-10-CM

## 2021-02-10 DIAGNOSIS — E78.5 HYPERLIPIDEMIA, UNSPECIFIED HYPERLIPIDEMIA TYPE: ICD-10-CM

## 2021-02-10 DIAGNOSIS — I10 ESSENTIAL HYPERTENSION: Primary | ICD-10-CM

## 2021-02-10 DIAGNOSIS — E55.9 VITAMIN D DEFICIENCY: ICD-10-CM

## 2021-02-10 DIAGNOSIS — R73.03 PREDIABETES: ICD-10-CM

## 2021-02-10 PROCEDURE — 3725F SCREEN DEPRESSION PERFORMED: CPT | Performed by: NURSE PRACTITIONER

## 2021-02-10 PROCEDURE — 99214 OFFICE O/P EST MOD 30 MIN: CPT | Performed by: NURSE PRACTITIONER

## 2021-02-10 RX ORDER — MULTIVITAMIN
1 TABLET ORAL DAILY
COMMUNITY
End: 2021-09-01

## 2021-02-10 NOTE — PROGRESS NOTES
Virtual Regular Visit      Assessment/Plan:    Problem List Items Addressed This Visit        Cardiovascular and Mediastinum    Essential hypertension - Primary    Relevant Orders    Comprehensive metabolic panel    CBC and differential    TSH, 3rd generation with Free T4 reflex    Lipid Panel with Direct LDL reflex    Continue lisinopril daily  Patient instructed to get a new BP cuff and to monitor his BP at home  Lab work ordered  Other    Morbid obesity ([address] )    Relevant Orders    Comprehensive metabolic panel    CBC and differential    TSH, 3rd generation with Free T4 reflex    Lipid Panel with Direct LDL reflex    HEMOGLOBIN A1C W/ EAG ESTIMATION    Ambulatory referral to Weight Management    Patient referred to the weight management center for further evaluation  Prediabetes    Relevant Orders    Comprehensive metabolic panel    CBC and differential    TSH, 3rd generation with Free T4 reflex    Lipid Panel with Direct LDL reflex    HEMOGLOBIN A1C W/ EAG ESTIMATION    Hemoglobin A1C ordered  Continue metformin  Vitamin D deficiency    Relevant Orders    Vitamin D 25 hydroxy    Hyperlipidemia    Relevant Orders    Comprehensive metabolic panel    CBC and differential    TSH, 3rd generation with Free T4 reflex    Lipid Panel with Direct LDL reflex    HEMOGLOBIN A1C W/ EAG ESTIMATION    Patient instructed to eat a healthy low fat/low carb diet and to exercise on a regular basis  Lab work ordered  Patient instructed to follow-up in 1 month or sooner prn                  Reason for visit is   Chief Complaint   Patient presents with    Virtual Regular Visit        Encounter provider LARS [facility]    Provider located at [address] [address]  [address] [place] [id number]      Recent Visits  Date Type Provider Dept   02/03/21 Telephone Gretchen Juarez, [address] recent visits within past 7 days and meeting all other requirements     Today's Visits  Date Type Provider Dept   02/10/21 Telemedicine L[facility]usuf [facility]   Showing today's visits and meeting all other requirements     Future Appointments  No visits were found meeting these conditions  Showing future appointments within next 150 days and meeting all other requirements        The patient was identified by name and date of birth  Lianna Hopkins was informed that this is a telemedicine visit and that the visit is being conducted through [facility] [place] and patient was informed that this is not a secure, HIPAA-compliant platform  He agrees to proceed     My office door was closed  No one else was in the room  He acknowledged consent and understanding of privacy and security of the video platform  The patient has agreed to participate and understands they can discontinue the visit at any time  Patient is aware this is a billable service  Subjective  Lianna Hopkins is a 50 y o  male    Patient is here for a virtual follow-up for chronic medical conditions  Patient reports that he feels fine  Patient is here for a follow-up for HTN  Patient reports that he takes lisinopril daily  Patient reports that he needs to get a new cuff at home  Denies any chest pain, SOB, palpitations, or dizziness  Patient is here for a follow-up for prediabetes  Patient reports that he is watching his diet  Patient reports that he takes the metformin daily  Denies any Has, dizziness, nausea, or vomiting  Patient is here for a follow-up for hyperlipidemia and obesity  Patient reports that he is watching his diet  Patient reports that he tries to walk  Patient is here for a follow-up for vitamin D deficiency  Patient reports that he takes a multivitamin and occasionally a separate vitamin D supplement          Past Medical History:   Diagnosis Date    Hypertension        Past Surgical History:   Procedure Laterality Date    WISDOM TOOTH EXTRACTION Current Outpatient Medications   Medication Sig Dispense Refill    Multiple Vitamin (multivitamin) tablet Take 1 tablet by mouth daily      cholecalciferol (VITAMIN D3) 1,000 units tablet Take 1 tablet (1,000 Units total) by mouth daily 30 tablet 0    lisinopril (ZESTRIL) 20 mg tablet Take 1 tablet (20 mg total) by mouth daily 90 tablet 0    metFORMIN (GLUCOPHAGE) 500 mg tablet Take 1 tablet (500 mg total) by mouth daily with dinner 90 tablet 0    TREMFYA subcutaneous injection        No current facility-administered medications for this visit  No Known Allergies    Review of Systems   Constitutional: Negative for chills and fever  HENT: Negative for congestion, ear pain and sore throat  Respiratory: Negative for cough, chest tightness, shortness of breath and wheezing  Cardiovascular: Negative for chest pain, palpitations and leg swelling  Gastrointestinal: Negative for abdominal pain, diarrhea, nausea and vomiting  Skin: Negative for rash  Neurological: Negative for dizziness, seizures, syncope, light-headedness and headaches  Psychiatric/Behavioral: Negative for suicidal ideas  Denies any depression  Video Exam    There were no vitals filed for this visit  Physical Exam  Constitutional:       General: He is not in acute distress  Appearance: He is not ill-appearing or diaphoretic  HENT:      Right Ear: External ear normal       Left Ear: External ear normal    Pulmonary:      Effort: Pulmonary effort is normal  No respiratory distress  Neurological:      Mental Status: He is alert and oriented to person, place, and time  Psychiatric:         Mood and Affect: Mood normal           I spent 20 minutes directly with the patient during this visit      VIRTUAL VISIT DISCLAIMER    Anniesiena Katie acknowledges that he has consented to an online visit or consultation   He understands that the online visit is based solely on information provided by him, and that, in the absence of a face-to-face physical evaluation by the physician, the diagnosis he receives is both limited and provisional in terms of accuracy and completeness  This is not intended to replace a full medical face-to-face evaluation by the physician  Jose Rafael Wagner understands and accepts these terms

## 2021-03-10 DIAGNOSIS — Z23 ENCOUNTER FOR IMMUNIZATION: ICD-10-CM

## 2021-03-23 ENCOUNTER — OFFICE VISIT (OUTPATIENT)
Dept: BARIATRICS | Facility: CLINIC | Age: 46
End: 2021-03-23
Payer: COMMERCIAL

## 2021-03-23 VITALS
HEART RATE: 95 BPM | DIASTOLIC BLOOD PRESSURE: 80 MMHG | SYSTOLIC BLOOD PRESSURE: 138 MMHG | TEMPERATURE: 96.9 F | HEIGHT: 71 IN | WEIGHT: 315 LBS | BODY MASS INDEX: 44.1 KG/M2

## 2021-03-23 DIAGNOSIS — E11.9 TYPE 2 DIABETES MELLITUS, WITHOUT LONG-TERM CURRENT USE OF INSULIN (HCC): ICD-10-CM

## 2021-03-23 DIAGNOSIS — E66.01 MORBID OBESITY (HCC): Primary | ICD-10-CM

## 2021-03-23 DIAGNOSIS — I10 ESSENTIAL HYPERTENSION: ICD-10-CM

## 2021-03-23 PROCEDURE — 1036F TOBACCO NON-USER: CPT | Performed by: PHYSICIAN ASSISTANT

## 2021-03-23 PROCEDURE — 3079F DIAST BP 80-89 MM HG: CPT | Performed by: PHYSICIAN ASSISTANT

## 2021-03-23 PROCEDURE — 99204 OFFICE O/P NEW MOD 45 MIN: CPT | Performed by: PHYSICIAN ASSISTANT

## 2021-03-23 PROCEDURE — 3008F BODY MASS INDEX DOCD: CPT | Performed by: PHYSICIAN ASSISTANT

## 2021-03-23 PROCEDURE — 3075F SYST BP GE 130 - 139MM HG: CPT | Performed by: PHYSICIAN ASSISTANT

## 2021-03-23 NOTE — ASSESSMENT & PLAN NOTE
Lab Results   Component Value Date    HGBA1C 6 6 (H) 09/06/2020   -On Metformin  -dietary/lifestyle changes  -can consider GLP-1

## 2021-03-23 NOTE — PATIENT INSTRUCTIONS
Goals: Food log Carb Manager  No sugary beverages  At least 64oz of water daily  Increase physical activity by 10 minutes daily   Gradually increase physical activity to a goal of 5 days per week for 30 minutes of MODERATE intensity PLUS 2 days per week of FULL BODY resistance training  1800 calories per day  5-10 servings of fruits and vegetables per day  120-130 grams of protein   Less than 120 grams of carb

## 2021-03-23 NOTE — PROGRESS NOTES
Assessment/Plan: Morbid obesity ([facility] [address] )  -Discussed options of [facility]-Intensive Lifestyle Intervention Program, Very Low Calorie Diet-VLCD, Conservative Program, Tello-En-Y Gastric Bypass and Vertical Sleeve Gastrectomy and the role of weight loss medications   -Initial weight loss goal of 5-10% weight loss for improved health  -Screening labs: reviewed  -Patient is interested in pursuing conservative program  Reviewed benefits of menu planning with RD  HE would like to hold off on this for now    +STOP BANG (7/8):  -reviewed risks of undiagnosed/untreated sleep apnea  -Recommended referral to sleep medicine provider for further evaluation   -Patient declined and would like to see if risks improve with weight loss  Essential hypertension  -on Lisinopril  -may improve with weight loss    Type 2 diabetes mellitus, without long-term current use of insulin ([facility])    Lab Results   Component Value Date    HGBA1C 6 6 (H) 09/06/2020   -On Metformin  -dietary/lifestyle changes  -can consider GLP-1    Goals:    Food log (ie ) www carbmanager  com  No sugary beverages  At least 64oz of water daily  Increase physical activity by 10 minutes daily  Gradually increase physical activity to a goal of 5 days per week for 30 minutes of MODERATE intensity PLUS 2 days per week of FULL BODY resistance training  1800 calories per day  5-10 servings of fruits and vegetables per day  120-130 grams of protein  Less than 120 grams of carb    Follow up in approximately 4-6 weeks with Non-Surgical Physician/Advanced Practitioner  Diagnoses and all orders for this visit:    Morbid obesity ([facility] [address] )    Essential hypertension    Type 2 diabetes mellitus, without long-term current use of insulin ([place] [address] )          Subjective:   Chief Complaint   Patient presents with    Consult     Pt is here for new mw consult  Patient ID: Jose Strong  is a 50 y o  male with excess weight/obesity here to pursue weight managment      Past Medical History:   Diagnosis Date    Diabetes ([facility] [address] )     Hypertension     Morbid obesity with BMI of 45 0-49 9, adult ([facility])          HPI:  Obesity/Excess Weight:  Severity: Severe  Onset: since  childhood   Modifiers: weight watchers, calorie counting, [facility] weight loss following low carb diet  Contributing factors: Stress/Emotional Eating and slower metabolism, more sedentary  Associated symptoms: fatigue, body image issues and increased shortness of breath    Goals: 200 lbs  Highest: 386 lbs    Hydration: water 64-80 oz; seltzer water, coffee + half and half, tea, diet soda  Exercise:  Denies  Occupation: , working from home currently  Sleep: ~6-7 hours  ETOH: 1-2 drinks per week or less  Smoking: denies    Colonoscopy: N/A    The following portions of the patient's history were reviewed and updated as appropriate: allergies, current medications, past family history, past medical history, past social history, past surgical history and problem list     Review of Systems   Constitutional: Negative for chills and fever  HENT: Negative for sore throat  Respiratory: Positive for shortness of breath  Negative for cough  Cardiovascular: Positive for chest pain (reports when feeling stressed, not with exertion  Advise f/u PCP)  Negative for palpitations  Gastrointestinal: Negative for abdominal pain, diarrhea and vomiting  Genitourinary: Negative for dysuria  Musculoskeletal: Positive for arthralgias (knees) and neck pain  Skin: Negative for rash  Neurological: Negative for dizziness and headaches  Psychiatric/Behavioral: The patient is not nervous/anxious  Feels agitated, increased stress  Denies overt depression       Objective:    /80 (BP Location: Left arm, Patient Position: Sitting, Cuff Size: Large)   Pulse 95   Temp (!) 96 9 °F (36 1 °C)   Ht 5' 11" (1 803 m)   Wt (!) 160 kg (353 lb 12 8 oz)   BMI 49 35 kg/m²     Physical Exam  Vitals signs and nursing note reviewed  Constitutional   General appearance: Abnormal   well developed and morbidly obese  Eyes No conjunctival pallor  Ears, Nose, Mouth, and Throat Oral mucosa moist    Pulmonary   Respiratory effort: No increased work of breathing or signs of respiratory distress  Auscultation of lungs: Clear to auscultation, equal breath sounds bilaterally, no wheezes, no rales, no rhonci  Cardiovascular   Auscultation of heart: Normal rate and rhythm, normal S1 and S2, without murmurs  Examination of extremities for edema and/or varicosities: Normal   no edema  Abdomen   Abdomen: Abnormal   The abdomen was obese  Bowel sounds were normal  The abdomen was soft and nontender     Musculoskeletal   Gait and station: Normal     Psychiatric   Orientation to person, place and time: Normal     Affect: appropriate

## 2021-03-23 NOTE — ASSESSMENT & PLAN NOTE
-Discussed options of [facility]-Intensive Lifestyle Intervention Program, Very Low Calorie Diet-VLCD, Conservative Program, Tello-En-Y Gastric Bypass and Vertical Sleeve Gastrectomy and the role of weight loss medications   -Initial weight loss goal of 5-10% weight loss for improved health  -Screening labs: reviewed  -Patient is interested in pursuing conservative program  Reviewed benefits of menu planning with RD  HE would like to hold off on this for now    +STOP BANG (7/8):  -reviewed risks of undiagnosed/untreated sleep apnea  -Recommended referral to sleep medicine provider for further evaluation   -Patient declined and would like to see if risks improve with weight loss

## 2021-03-29 ENCOUNTER — TRANSCRIBE ORDERS (OUTPATIENT)
Dept: LAB | Facility: CLINIC | Age: 46
End: 2021-03-29

## 2021-03-29 ENCOUNTER — APPOINTMENT (OUTPATIENT)
Dept: LAB | Facility: CLINIC | Age: 46
End: 2021-03-29
Payer: COMMERCIAL

## 2021-03-29 DIAGNOSIS — I10 ESSENTIAL HYPERTENSION: ICD-10-CM

## 2021-03-29 DIAGNOSIS — E55.9 VITAMIN D DEFICIENCY: ICD-10-CM

## 2021-03-29 DIAGNOSIS — R73.03 PREDIABETES: ICD-10-CM

## 2021-03-29 DIAGNOSIS — E66.01 MORBID OBESITY (HCC): ICD-10-CM

## 2021-03-29 DIAGNOSIS — E78.5 HYPERLIPIDEMIA, UNSPECIFIED HYPERLIPIDEMIA TYPE: ICD-10-CM

## 2021-03-29 LAB
25(OH)D3 SERPL-MCNC: 17.8 NG/ML (ref 30–100)
ALBUMIN SERPL BCP-MCNC: 3.9 G/DL (ref 3.5–5)
ALP SERPL-CCNC: 92 U/L (ref 46–116)
ALT SERPL W P-5'-P-CCNC: 81 U/L (ref 12–78)
ANION GAP SERPL CALCULATED.3IONS-SCNC: 7 MMOL/L (ref 4–13)
AST SERPL W P-5'-P-CCNC: 33 U/L (ref 5–45)
BASOPHILS # BLD AUTO: 0.08 THOUSANDS/ΜL (ref 0–0.1)
BASOPHILS NFR BLD AUTO: 1 % (ref 0–1)
BILIRUB SERPL-MCNC: 0.66 MG/DL (ref 0.2–1)
BUN SERPL-MCNC: 14 MG/DL (ref 5–25)
CALCIUM SERPL-MCNC: 9.3 MG/DL (ref 8.3–10.1)
CHLORIDE SERPL-SCNC: 101 MMOL/L (ref 100–108)
CHOLEST SERPL-MCNC: 178 MG/DL (ref 50–200)
CO2 SERPL-SCNC: 26 MMOL/L (ref 21–32)
CREAT SERPL-MCNC: 0.88 MG/DL (ref 0.6–1.3)
EOSINOPHIL # BLD AUTO: 0.32 THOUSAND/ΜL (ref 0–0.61)
EOSINOPHIL NFR BLD AUTO: 3 % (ref 0–6)
ERYTHROCYTE [DISTWIDTH] IN BLOOD BY AUTOMATED COUNT: 13.3 % (ref 11.6–15.1)
EST. AVERAGE GLUCOSE BLD GHB EST-MCNC: 214 MG/DL
GFR SERPL CREATININE-BSD FRML MDRD: 102 ML/MIN/1.73SQ M
GLUCOSE P FAST SERPL-MCNC: 231 MG/DL (ref 65–99)
HBA1C MFR BLD: 9.1 %
HCT VFR BLD AUTO: 46.2 % (ref 36.5–49.3)
HDLC SERPL-MCNC: 32 MG/DL
HGB BLD-MCNC: 15.5 G/DL (ref 12–17)
IMM GRANULOCYTES # BLD AUTO: 0.03 THOUSAND/UL (ref 0–0.2)
IMM GRANULOCYTES NFR BLD AUTO: 0 % (ref 0–2)
LDLC SERPL CALC-MCNC: 90 MG/DL (ref 0–100)
LYMPHOCYTES # BLD AUTO: 3.97 THOUSANDS/ΜL (ref 0.6–4.47)
LYMPHOCYTES NFR BLD AUTO: 40 % (ref 14–44)
MCH RBC QN AUTO: 27.9 PG (ref 26.8–34.3)
MCHC RBC AUTO-ENTMCNC: 33.5 G/DL (ref 31.4–37.4)
MCV RBC AUTO: 83 FL (ref 82–98)
MONOCYTES # BLD AUTO: 0.71 THOUSAND/ΜL (ref 0.17–1.22)
MONOCYTES NFR BLD AUTO: 7 % (ref 4–12)
NEUTROPHILS # BLD AUTO: 4.78 THOUSANDS/ΜL (ref 1.85–7.62)
NEUTS SEG NFR BLD AUTO: 49 % (ref 43–75)
NRBC BLD AUTO-RTO: 0 /100 WBCS
PLATELET # BLD AUTO: 250 THOUSANDS/UL (ref 149–390)
PMV BLD AUTO: 9.8 FL (ref 8.9–12.7)
POTASSIUM SERPL-SCNC: 4.4 MMOL/L (ref 3.5–5.3)
PROT SERPL-MCNC: 7.6 G/DL (ref 6.4–8.2)
RBC # BLD AUTO: 5.56 MILLION/UL (ref 3.88–5.62)
SODIUM SERPL-SCNC: 134 MMOL/L (ref 136–145)
TRIGL SERPL-MCNC: 280 MG/DL
TSH SERPL DL<=0.05 MIU/L-ACNC: 3.28 UIU/ML (ref 0.36–3.74)
WBC # BLD AUTO: 9.89 THOUSAND/UL (ref 4.31–10.16)

## 2021-03-29 PROCEDURE — 82306 VITAMIN D 25 HYDROXY: CPT

## 2021-03-29 PROCEDURE — 85025 COMPLETE CBC W/AUTO DIFF WBC: CPT

## 2021-03-29 PROCEDURE — 36415 COLL VENOUS BLD VENIPUNCTURE: CPT

## 2021-03-29 PROCEDURE — 80061 LIPID PANEL: CPT

## 2021-03-29 PROCEDURE — 83036 HEMOGLOBIN GLYCOSYLATED A1C: CPT

## 2021-03-29 PROCEDURE — 80053 COMPREHEN METABOLIC PANEL: CPT

## 2021-03-29 PROCEDURE — 3046F HEMOGLOBIN A1C LEVEL >9.0%: CPT | Performed by: PHYSICIAN ASSISTANT

## 2021-03-29 PROCEDURE — 84443 ASSAY THYROID STIM HORMONE: CPT

## 2021-03-31 ENCOUNTER — TELEPHONE (OUTPATIENT)
Dept: BARIATRICS | Facility: CLINIC | Age: 46
End: 2021-03-31

## 2021-04-12 ENCOUNTER — TELEMEDICINE (OUTPATIENT)
Dept: FAMILY MEDICINE CLINIC | Facility: CLINIC | Age: 46
End: 2021-04-12
Payer: COMMERCIAL

## 2021-04-12 VITALS
DIASTOLIC BLOOD PRESSURE: 78 MMHG | HEIGHT: 71 IN | BODY MASS INDEX: 44.1 KG/M2 | SYSTOLIC BLOOD PRESSURE: 126 MMHG | WEIGHT: 315 LBS

## 2021-04-12 DIAGNOSIS — E11.65 TYPE 2 DIABETES MELLITUS WITH HYPERGLYCEMIA, WITHOUT LONG-TERM CURRENT USE OF INSULIN (HCC): Primary | ICD-10-CM

## 2021-04-12 DIAGNOSIS — I10 ESSENTIAL HYPERTENSION: ICD-10-CM

## 2021-04-12 DIAGNOSIS — E78.1 HYPERTRIGLYCERIDEMIA: ICD-10-CM

## 2021-04-12 DIAGNOSIS — E55.9 VITAMIN D DEFICIENCY: ICD-10-CM

## 2021-04-12 DIAGNOSIS — E66.01 MORBID OBESITY (HCC): ICD-10-CM

## 2021-04-12 PROCEDURE — 99214 OFFICE O/P EST MOD 30 MIN: CPT | Performed by: NURSE PRACTITIONER

## 2021-04-12 PROCEDURE — 4010F ACE/ARB THERAPY RXD/TAKEN: CPT | Performed by: NURSE PRACTITIONER

## 2021-04-12 PROCEDURE — 3078F DIAST BP <80 MM HG: CPT | Performed by: NURSE PRACTITIONER

## 2021-04-12 PROCEDURE — 3008F BODY MASS INDEX DOCD: CPT | Performed by: NURSE PRACTITIONER

## 2021-04-12 PROCEDURE — 3074F SYST BP LT 130 MM HG: CPT | Performed by: NURSE PRACTITIONER

## 2021-04-12 PROCEDURE — 3725F SCREEN DEPRESSION PERFORMED: CPT | Performed by: NURSE PRACTITIONER

## 2021-04-12 RX ORDER — ERGOCALCIFEROL 1.25 MG/1
50000 CAPSULE ORAL WEEKLY
Qty: 12 CAPSULE | Refills: 0 | Status: SHIPPED | OUTPATIENT
Start: 2021-04-12 | End: 2021-08-27

## 2021-04-12 RX ORDER — LISINOPRIL 20 MG/1
20 TABLET ORAL DAILY
Qty: 90 TABLET | Refills: 0 | Status: SHIPPED | OUTPATIENT
Start: 2021-04-12 | End: 2021-07-28 | Stop reason: SDUPTHER

## 2021-04-12 RX ORDER — METFORMIN HYDROCHLORIDE 500 MG/1
1000 TABLET, EXTENDED RELEASE ORAL
Qty: 60 TABLET | Refills: 1 | Status: SHIPPED | OUTPATIENT
Start: 2021-04-12 | End: 2021-05-05

## 2021-04-12 NOTE — PROGRESS NOTES
Virtual Regular Visit      Assessment/Plan:    Problem List Items Addressed This Visit        Endocrine    Type 2 diabetes mellitus with hyperglycemia, without long-term current use of insulin ([facility]) - Primary    Relevant Medications    metFORMIN (GLUCOPHAGE-XR) 500 mg 24 hr tablet    Other Relevant Orders    Comprehensive metabolic panel    CBC and differential    Lipid Panel with Direct LDL reflex    HEMOGLOBIN A1C W/ EAG ESTIMATION    Microalbumin / creatinine urine ratio    Hemoglobin A1C went up from 6 6 to 9 1  Diabetic diet reviewed  Increased metformin to 1,000mg daily  Medication information and side effects reviewed  Repeat Hemoglobin A1C ordered  Cardiovascular and Mediastinum    Essential hypertension    Relevant Medications    lisinopril (ZESTRIL) 20 mg tablet    Other Relevant Orders    Comprehensive metabolic panel    CBC and differential    Lipid Panel with Direct LDL reflex    Patient reports that his BP is 120s-130s/70s-80s at home  Continue lisinopril daily  Other    Hypertriglyceridemia    Relevant Orders    Comprehensive metabolic panel    CBC and differential    Lipid Panel with Direct LDL reflex    Total cholesterol 178, Triglycerides 280, and LDL 90  Patient does not want to start cholesterol medication at this time and wants to work on his diet  Patient is aware of the risks associated with high cholesterol including heart attack and stroke  Patient instructed to eat a low fat/diabetic diet  Repeat lipid panel ordered  Morbid obesity ([address] )    Relevant Orders    Comprehensive metabolic panel    CBC and differential    Lipid Panel with Direct LDL reflex    HEMOGLOBIN A1C W/ EAG ESTIMATION    Patient instructed to exercise on a regular basis and to eat a healthy low fat/diabetic diet  Importance of weight loss discussed         Vitamin D deficiency    Relevant Medications    ergocalciferol (VITAMIN D2) 50,000 units    Other Relevant Orders    Vitamin D 25 hydroxy    Vitamin D level 17 8  Vitamin D 50,000 units weekly prescribed  Repeat vitamin D level ordered  Reviewed lab results with the patient  Patient instructed to follow-up in 3 months or sooner prn  BMI Counseling: Body mass index is 49 09 kg/m²  The BMI is above normal  Nutrition recommendations include encouraging healthy choices of fruits and vegetables, consuming healthier snacks, reducing intake of saturated and trans fat and reducing intake of cholesterol  Exercise recommendations include exercising 3-5 times per week  Reason for visit is   Chief Complaint   Patient presents with    Follow-up    Virtual Regular Visit        Encounter provider LARS Cai    Provider located at [address] [place] [id number]      Recent Visits  No visits were found meeting these conditions  Showing recent visits within past 7 days and meeting all other requirements     Today's Visits  Date Type Provider Dept   04/12/21 Telemedicine LARS Cai [facility]   Showing today's visits and meeting all other requirements     Future Appointments  No visits were found meeting these conditions  Showing future appointments within next 150 days and meeting all other requirements        The patient was identified by name and date of birth  Harley Lewis was informed that this is a telemedicine visit and that the visit is being conducted through [address] [place] [address] and patient was informed that this is a secure, HIPAA-compliant platform  He agrees to proceed     My office door was closed  No one else was in the room  He acknowledged consent and understanding of privacy and security of the video platform  The patient has agreed to participate and understands they can discontinue the visit at any time  Patient is aware this is a billable service  Subjective  Harley Lewis is a 50 y o  male        Patient is here for a virtual follow-up for chronic medical conditions  Patient is here for a follow-up for HTN  Patient reports that his BP is usually 120s-130s/70s-80s at home  Denies any chest pain, SOB, palpitations, or dizziness  Patient is here for a follow-up for type 2 DM  Patient reports that he does not check his blood sugars at home  Denies any dizziness, nausea, or vomiting  Patient reports that he takes the metformin at dinner time  Patient is here for a follow-up for obesity  Patient reports that he is doing strength training about 3 times a week  Patient reports that he is cutting back on carbs  Past Medical History:   Diagnosis Date    [facility]     Hypertension     Morbid obesity with BMI of 45 0-49 9, adult ([facility] )        Past Surgical History:   Procedure Laterality Date    WISDOM TOOTH EXTRACTION         Current Outpatient Medications   Medication Sig Dispense Refill    lisinopril (ZESTRIL) 20 mg tablet Take 1 tablet (20 mg total) by mouth daily 90 tablet 0    Multiple Vitamin (multivitamin) tablet Take 1 tablet by mouth daily      TREMFYA subcutaneous injection Inject 100 mg under the skin every 56 days       cholecalciferol (VITAMIN D3) 1,000 units tablet Take 1 tablet (1,000 Units total) by mouth daily 30 tablet 0    ergocalciferol (VITAMIN D2) 50,000 units Take 1 capsule (50,000 Units total) by mouth once a week 12 capsule 0    metFORMIN (GLUCOPHAGE-XR) 500 mg 24 hr tablet Take 2 tablets (1,000 mg total) by mouth daily with breakfast 60 tablet 1     No current facility-administered medications for this visit  No Known Allergies    Review of Systems   Constitutional: Negative for chills, fatigue and fever  HENT: Negative for congestion, ear pain, sinus pressure and sore throat  Respiratory: Negative for cough, chest tightness and shortness of breath  Cardiovascular: Negative for chest pain, palpitations and leg swelling     Gastrointestinal: Negative for abdominal pain, blood in stool, diarrhea, nausea and vomiting  Genitourinary: Negative for dysuria, frequency, hematuria and urgency  Musculoskeletal: Negative for myalgias  Skin: Negative for rash  Neurological: Negative for dizziness, seizures, syncope, weakness and light-headedness  Video Exam    Vitals:    04/12/21 1259   BP: 126/78   Weight: (!) 160 kg (352 lb)   Height: 5' 11" (1 803 m)       Physical Exam  Constitutional:       General: He is not in acute distress  Appearance: He is not ill-appearing or diaphoretic  HENT:      Right Ear: External ear normal       Left Ear: External ear normal    Pulmonary:      Effort: Pulmonary effort is normal  No respiratory distress  Neurological:      Mental Status: He is alert and oriented to person, place, and time  Psychiatric:         Mood and Affect: Mood normal           I spent 15 minutes with patient today in which greater than 50% of the time was spent in counseling/coordination of care regarding lab results, diabetes treatment plan, diet, and importance of exercise      VIRTUAL VISIT DISCLAIMER    Lele Iraheta acknowledges that he has consented to an online visit or consultation  He understands that the online visit is based solely on information provided by him, and that, in the absence of a face-to-face physical evaluation by the physician, the diagnosis he receives is both limited and provisional in terms of accuracy and completeness  This is not intended to replace a full medical face-to-face evaluation by the physician  Lele Iraheta understands and accepts these terms

## 2021-05-05 DIAGNOSIS — E11.65 TYPE 2 DIABETES MELLITUS WITH HYPERGLYCEMIA, WITHOUT LONG-TERM CURRENT USE OF INSULIN (HCC): ICD-10-CM

## 2021-05-05 RX ORDER — METFORMIN HYDROCHLORIDE 500 MG/1
TABLET, EXTENDED RELEASE ORAL
Qty: 60 TABLET | Refills: 1 | Status: SHIPPED | OUTPATIENT
Start: 2021-05-05 | End: 2021-05-24

## 2021-05-19 DIAGNOSIS — E11.65 TYPE 2 DIABETES MELLITUS WITH HYPERGLYCEMIA, WITHOUT LONG-TERM CURRENT USE OF INSULIN (HCC): ICD-10-CM

## 2021-05-24 ENCOUNTER — TELEMEDICINE (OUTPATIENT)
Dept: FAMILY MEDICINE CLINIC | Facility: CLINIC | Age: 46
End: 2021-05-24
Payer: COMMERCIAL

## 2021-05-24 VITALS — BODY MASS INDEX: 44.1 KG/M2 | WEIGHT: 315 LBS | HEIGHT: 71 IN

## 2021-05-24 DIAGNOSIS — Z20.822 EXPOSURE TO COVID-19 VIRUS: ICD-10-CM

## 2021-05-24 DIAGNOSIS — J01.10 ACUTE NON-RECURRENT FRONTAL SINUSITIS: Primary | ICD-10-CM

## 2021-05-24 LAB — SARS-COV-2 RNA RESP QL NAA+PROBE: NEGATIVE

## 2021-05-24 PROCEDURE — U0005 INFEC AGEN DETEC AMPLI PROBE: HCPCS | Performed by: FAMILY MEDICINE

## 2021-05-24 PROCEDURE — U0003 INFECTIOUS AGENT DETECTION BY NUCLEIC ACID (DNA OR RNA); SEVERE ACUTE RESPIRATORY SYNDROME CORONAVIRUS 2 (SARS-COV-2) (CORONAVIRUS DISEASE [COVID-19]), AMPLIFIED PROBE TECHNIQUE, MAKING USE OF HIGH THROUGHPUT TECHNOLOGIES AS DESCRIBED BY CMS-2020-01-R: HCPCS | Performed by: FAMILY MEDICINE

## 2021-05-24 PROCEDURE — 3725F SCREEN DEPRESSION PERFORMED: CPT | Performed by: FAMILY MEDICINE

## 2021-05-24 PROCEDURE — 99214 OFFICE O/P EST MOD 30 MIN: CPT | Performed by: FAMILY MEDICINE

## 2021-05-24 RX ORDER — AMOXICILLIN 875 MG/1
875 TABLET, COATED ORAL 2 TIMES DAILY
Qty: 14 TABLET | Refills: 0 | Status: SHIPPED | OUTPATIENT
Start: 2021-05-24 | End: 2021-05-31

## 2021-05-24 RX ORDER — METFORMIN HYDROCHLORIDE 500 MG/1
TABLET, EXTENDED RELEASE ORAL
Qty: 180 TABLET | Refills: 0 | Status: SHIPPED | OUTPATIENT
Start: 2021-05-24 | End: 2021-09-02

## 2021-05-24 NOTE — PROGRESS NOTES
Virtual Regular Visit      Assessment/Plan:    Problem List Items Addressed This Visit        Respiratory    Acute non-recurrent frontal sinusitis - Primary    Relevant Medications    amoxicillin (AMOXIL) 875 mg tablet       Other    Exposure to COVID-19 virus     Requesting covid test for work, lost taste and smell yesterday  Relevant Orders    Novel Coronavirus (Covid-19),PCR [facility] - Collected at [facility] or [facility]               Reason for visit is   Chief Complaint   Patient presents with    COVID-19    Virtual Regular Visit        Encounter provider Trace Singh MD    Provider located at [address] [place] [phone]      Recent Visits  No visits were found meeting these conditions  Showing recent visits within past 7 days and meeting all other requirements     Today's Visits  Date Type Provider Dept   05/24/21 Telemedicine Trace Singh MD [facility]   Showing today's visits and meeting all other requirements     Future Appointments  No visits were found meeting these conditions  Showing future appointments within next 150 days and meeting all other requirements        The patient was identified by name and date of birth  Norberto Clay was informed that this is a telemedicine visit and that the visit is being conducted through [address] [place] [address] Now and patient was informed that this is a secure, HIPAA-compliant platform  He agrees to proceed     My office door was closed  No one else was in the room  He acknowledged consent and understanding of privacy and security of the video platform  The patient has agreed to participate and understands they can discontinue the visit at any time  Patient is aware this is a billable service  Subjective  Norberto Clay is a 50 y o  male    Sinusitis  This is a new problem  Episode onset: 3 days ago  The problem is unchanged  There has been no fever  His pain is at a severity of 5/10  The pain is moderate  Associated symptoms include congestion, coughing, a hoarse voice and sinus pressure  Pertinent negatives include no headaches or shortness of breath  Past treatments include nothing  The treatment provided no relief  Patient states that his son had similar symptoms, on an antibiotic now  Symptoms are improving  Patient is coughing up purulent mucus  States that he lost the sensation of smell and taste yesterday, is concerned about COVID and would like to be tested  He has quarantine himself from his family now  Past Medical History:   Diagnosis Date    [email or web address]     Hypertension     Morbid obesity with BMI of 45 0-49 9, adult ([email or web address] )        Past Surgical History:   Procedure Laterality Date    WISDOM TOOTH EXTRACTION         Current Outpatient Medications   Medication Sig Dispense Refill    ergocalciferol (VITAMIN D2) 50,000 units Take 1 capsule (50,000 Units total) by mouth once a week 12 capsule 0    lisinopril (ZESTRIL) 20 mg tablet Take 1 tablet (20 mg total) by mouth daily 90 tablet 0    metFORMIN (GLUCOPHAGE-XR) 500 mg 24 hr tablet TAKE 2 TABLETS BY MOUTH EVERY DAY WITH BREAKFAST 60 tablet 1    Multiple Vitamin (multivitamin) tablet Take 1 tablet by mouth daily      TREMFYA subcutaneous injection Inject 100 mg under the skin every 56 days       amoxicillin (AMOXIL) 875 mg tablet Take 1 tablet (875 mg total) by mouth 2 (two) times a day for 7 days 14 tablet 0    cholecalciferol (VITAMIN D3) 1,000 units tablet Take 1 tablet (1,000 Units total) by mouth daily (Patient not taking: Reported on 5/24/2021) 30 tablet 0     No current facility-administered medications for this visit  No Known Allergies    Review of Systems   Constitutional: Negative  HENT: Positive for congestion, hoarse voice and sinus pressure  Respiratory: Positive for cough  Negative for shortness of breath  Cardiovascular: Negative  Negative for chest pain and palpitations  Gastrointestinal: Negative  Endocrine: Negative  Genitourinary: Negative  Musculoskeletal: Negative  Negative for myalgias  Allergic/Immunologic: Negative  Neurological: Negative  Negative for headaches  Psychiatric/Behavioral: Negative  Video Exam    Vitals:    05/24/21 0804   Weight: (!) 154 kg (340 lb)   Height: 5' 11" (1 803 m)       Physical Exam  Constitutional:       General: He is not in acute distress  Appearance: He is well-developed  Pulmonary:      Effort: Pulmonary effort is normal  No respiratory distress  Neurological:      Mental Status: He is alert and oriented to person, place, and time  Psychiatric:         Behavior: Behavior normal          Thought Content: Thought content normal          Judgment: Judgment normal           I spent 25 minutes with patient today in which greater than 50% of the time was spent in counseling/coordination of care regarding Management of symptoms  Suggesting treatment options  CDC COVID guidelines advised  VIRTUAL VISIT DISCLAIMER    Cathleen Mcfadden acknowledges that he has consented to an online visit or consultation  He understands that the online visit is based solely on information provided by him, and that, in the absence of a face-to-face physical evaluation by the physician, the diagnosis he receives is both limited and provisional in terms of accuracy and completeness  This is not intended to replace a full medical face-to-face evaluation by the physician  Cathleen Mcfadden understands and accepts these terms

## 2021-05-27 ENCOUNTER — TELEMEDICINE (OUTPATIENT)
Dept: FAMILY MEDICINE CLINIC | Facility: CLINIC | Age: 46
End: 2021-05-27
Payer: COMMERCIAL

## 2021-05-27 VITALS — TEMPERATURE: 97.3 F | HEIGHT: 71 IN | BODY MASS INDEX: 44.1 KG/M2 | WEIGHT: 315 LBS

## 2021-05-27 DIAGNOSIS — B34.9 VIRAL INFECTION, UNSPECIFIED: ICD-10-CM

## 2021-05-27 DIAGNOSIS — B34.9 VIRAL INFECTION, UNSPECIFIED: Primary | ICD-10-CM

## 2021-05-27 LAB — SARS-COV-2 RNA RESP QL NAA+PROBE: NEGATIVE

## 2021-05-27 PROCEDURE — U0005 INFEC AGEN DETEC AMPLI PROBE: HCPCS | Performed by: NURSE PRACTITIONER

## 2021-05-27 PROCEDURE — U0003 INFECTIOUS AGENT DETECTION BY NUCLEIC ACID (DNA OR RNA); SEVERE ACUTE RESPIRATORY SYNDROME CORONAVIRUS 2 (SARS-COV-2) (CORONAVIRUS DISEASE [COVID-19]), AMPLIFIED PROBE TECHNIQUE, MAKING USE OF HIGH THROUGHPUT TECHNOLOGIES AS DESCRIBED BY CMS-2020-01-R: HCPCS | Performed by: NURSE PRACTITIONER

## 2021-05-27 PROCEDURE — 3008F BODY MASS INDEX DOCD: CPT | Performed by: NURSE PRACTITIONER

## 2021-05-27 PROCEDURE — 99213 OFFICE O/P EST LOW 20 MIN: CPT | Performed by: NURSE PRACTITIONER

## 2021-05-27 PROCEDURE — 1036F TOBACCO NON-USER: CPT | Performed by: NURSE PRACTITIONER

## 2021-05-27 NOTE — PROGRESS NOTES
COVID-19 Outpatient Progress Note    Assessment/Plan:    Problem List Items Addressed This Visit        Other    Viral infection, unspecified - Primary    Relevant Orders    Novel Coronavirus (Covid-19),PCR [facility] - Collected at [facility] or [facility]         Disposition:     I referred patient to one of our centralized sites for a COVID-19 swab  Patient's symptoms started on 5/22/21  Patient reports that he started treatment for a sinus infection with amoxicillin on 5/24/21  Patient reports that he tested negative for COVID 19 on 5/24/21  Patient reports that his symptoms are improving but he is concerned because he has loss of taste and smell  Patient reports that he also has occasional chills now  COVID 19 testing ordered  Will follow-up results with the patient  Quarantine instructions reviewed  Patient instructed to follow-up if symptoms get worse or do not get better  Patient instructed to go to the ED if he becomes SOB  I have spent 8 minutes directly with the patient  Greater than 50% of this time was spent in counseling/coordination of care regarding: COVID 19 virus infection and quarantine instructions           Encounter provider Laura LARS Francisco    Provider located at [address] [place] [phone]    Recent Visits  Date Type Provider Dept   05/24/21 Lupillo Burdick MD [facility]   Showing recent visits within past 7 days and meeting all other requirements     Today's Visits  Date Type Provider Dept   05/27/21 Telemedicine Clyde ParkLARS Root [facility]   Showing today's visits and meeting all other requirements     Future Appointments  No visits were found meeting these conditions     Showing future appointments within next 150 days and meeting all other requirements      This virtual check-in was done via [employer] and patient was informed that this is a secure, HIPAA-compliant platform  He agrees to proceed  Patient agrees to participate in a virtual check in via telephone or video visit instead of presenting to the office to address urgent/immediate medical needs  Patient is aware this is a billable service  After connecting through [facility], the patient was identified by name and date of birth  Shlomo Ryan was informed that this was a telemedicine visit and that the exam was being conducted confidentially over secure lines  My office door was closed  No one else was in the room  Shlomo Ryan acknowledged consent and understanding of privacy and security of the telemedicine visit  I informed the patient that I have reviewed his record in Epic and presented the opportunity for him to ask any questions regarding the visit today  The patient agreed to participate  Subjective:   Shlomo Ryan is a 50 y o  male who is concerned about COVID-19  Patient's symptoms include chills, nasal congestion, rhinorrhea, sore throat, anosmia, loss of taste and cough  Patient denies fever, fatigue, malaise, shortness of breath, chest tightness, abdominal pain, nausea, vomiting, diarrhea, myalgias and headaches       Date of symptom onset: 5/22/2021    Exposure:   Contact with a person who is under investigation (PUI) for or who is positive for COVID-19 within the last 14 days?: No    Hospitalized recently for fever and/or lower respiratory symptoms?: No      Currently a healthcare worker that is involved in direct patient care?: No      Works in a special setting where the risk of COVID-19 transmission may be high? (this may include long-term care, correctional and long-term facilities; homeless shelters; assisted-living facilities and group homes ): No      Resident in a special setting where the risk of COVID-19 transmission may be high? (this may include long-term care, correctional and long-term facilities; homeless shelters; assisted-living facilities and group homes ): No      Patient reports that he was started on amoxicillin for acute sinusitis on 5/24/21  Patient reports that he was tested for COVID 19 and was negative  Patient reports that his symptoms are improving but he is concerned because he still has a loss of taste and smell  Patient reports that he also has occasional chills now  Lab Results   Component Value Date    SARSCOV2 Negative 05/24/2021     Past Medical History:   Diagnosis Date    Diabetes ([facility] )     Hypertension     Morbid obesity with BMI of 45 0-49 9, adult ([facility] )      Past Surgical History:   Procedure Laterality Date    WISDOM TOOTH EXTRACTION       Current Outpatient Medications   Medication Sig Dispense Refill    amoxicillin (AMOXIL) 875 mg tablet Take 1 tablet (875 mg total) by mouth 2 (two) times a day for 7 days 14 tablet 0    ergocalciferol (VITAMIN D2) 50,000 units Take 1 capsule (50,000 Units total) by mouth once a week 12 capsule 0    lisinopril (ZESTRIL) 20 mg tablet Take 1 tablet (20 mg total) by mouth daily 90 tablet 0    metFORMIN (GLUCOPHAGE-XR) 500 mg 24 hr tablet TAKE 2 TABLETS BY MOUTH EVERY DAY WITH BREAKFAST 180 tablet 0    Multiple Vitamin (multivitamin) tablet Take 1 tablet by mouth daily      TREMFYA subcutaneous injection Inject 100 mg under the skin every 56 days       cholecalciferol (VITAMIN D3) 1,000 units tablet Take 1 tablet (1,000 Units total) by mouth daily (Patient not taking: Reported on 5/24/2021) 30 tablet 0     No current facility-administered medications for this visit  No Known Allergies    Review of Systems   Constitutional: Positive for chills  Negative for fatigue and fever  HENT: Positive for congestion, rhinorrhea and sore throat  Negative for ear pain  Respiratory: Positive for cough  Negative for chest tightness, shortness of breath and wheezing  Cardiovascular: Negative for chest pain  Gastrointestinal: Negative for abdominal pain, diarrhea, nausea and vomiting     Musculoskeletal: Negative for myalgias  Skin: Negative for rash  Neurological: Negative for dizziness, syncope, light-headedness and headaches  Objective:    Vitals:    05/27/21 0835   Temp: (!) 97 3 °F (36 3 °C)   Weight: (!) 154 kg (340 lb)   Height: 5' 11" (1 803 m)       Physical Exam  Constitutional:       General: He is not in acute distress  Appearance: He is not diaphoretic  HENT:      Right Ear: External ear normal       Left Ear: External ear normal    Pulmonary:      Effort: Pulmonary effort is normal  No respiratory distress  Neurological:      Mental Status: He is alert and oriented to person, place, and time  Psychiatric:         Mood and Affect: Mood normal        VIRTUAL VISIT DISCLAIMER    Shlomo Ryan acknowledges that he has consented to an online visit or consultation  He understands that the online visit is based solely on information provided by him, and that, in the absence of a face-to-face physical evaluation by the physician, the diagnosis he receives is both limited and provisional in terms of accuracy and completeness  This is not intended to replace a full medical face-to-face evaluation by the physician  Shlomo Ryan understands and accepts these terms

## 2021-07-21 ENCOUNTER — TELEPHONE (OUTPATIENT)
Dept: FAMILY MEDICINE CLINIC | Facility: CLINIC | Age: 46
End: 2021-07-21

## 2021-07-21 NOTE — TELEPHONE ENCOUNTER
----- Message from Ju Ignacio, [address] Hsieh sent at 7/21/2021 12:21 PM EDT -----  Please schedule patient for a follow-up visit to review his lab results

## 2021-07-28 DIAGNOSIS — I10 ESSENTIAL HYPERTENSION: ICD-10-CM

## 2021-07-28 RX ORDER — LISINOPRIL 20 MG/1
20 TABLET ORAL DAILY
Qty: 30 TABLET | Refills: 0 | Status: SHIPPED | OUTPATIENT
Start: 2021-07-28 | End: 2021-09-02 | Stop reason: SDUPTHER

## 2021-07-28 NOTE — TELEPHONE ENCOUNTER
----- Message from Barney Elliott DO sent at 7/28/2021  9:47 AM EDT -----  Please call patient to schedule follow-up appointment to review lab results  Patient was contacted after his labs were done July 14th to schedule an appointment to address his labs  Hemoglobin A1c at that time was 9 0%  Not certain why a another CMP was done 2 weeks later    I cannot find a reason why but he needs a follow-up appointment to address diabetes

## 2021-09-01 RX ORDER — ELECTROLYTES/DEXTROSE
1 SOLUTION, ORAL ORAL DAILY
COMMUNITY

## 2021-09-02 ENCOUNTER — TELEMEDICINE (OUTPATIENT)
Dept: FAMILY MEDICINE CLINIC | Facility: CLINIC | Age: 46
End: 2021-09-02
Payer: COMMERCIAL

## 2021-09-02 VITALS
BODY MASS INDEX: 44.1 KG/M2 | WEIGHT: 315 LBS | SYSTOLIC BLOOD PRESSURE: 138 MMHG | HEIGHT: 71 IN | DIASTOLIC BLOOD PRESSURE: 82 MMHG

## 2021-09-02 DIAGNOSIS — I87.2 VENOUS INSUFFICIENCY OF BOTH LOWER EXTREMITIES: ICD-10-CM

## 2021-09-02 DIAGNOSIS — E78.5 HYPERLIPIDEMIA LDL GOAL <70: ICD-10-CM

## 2021-09-02 DIAGNOSIS — E11.65 TYPE 2 DIABETES MELLITUS WITH HYPERGLYCEMIA, WITHOUT LONG-TERM CURRENT USE OF INSULIN (HCC): Primary | ICD-10-CM

## 2021-09-02 DIAGNOSIS — I10 ESSENTIAL HYPERTENSION: ICD-10-CM

## 2021-09-02 DIAGNOSIS — R74.01 ELEVATED ALT MEASUREMENT: ICD-10-CM

## 2021-09-02 DIAGNOSIS — E66.01 MORBID OBESITY (HCC): ICD-10-CM

## 2021-09-02 PROBLEM — R63.5 ABNORMAL WEIGHT GAIN: Status: ACTIVE | Noted: 2021-09-01

## 2021-09-02 PROCEDURE — 4010F ACE/ARB THERAPY RXD/TAKEN: CPT | Performed by: FAMILY MEDICINE

## 2021-09-02 PROCEDURE — 3079F DIAST BP 80-89 MM HG: CPT | Performed by: FAMILY MEDICINE

## 2021-09-02 PROCEDURE — 99214 OFFICE O/P EST MOD 30 MIN: CPT | Performed by: FAMILY MEDICINE

## 2021-09-02 PROCEDURE — 3008F BODY MASS INDEX DOCD: CPT | Performed by: FAMILY MEDICINE

## 2021-09-02 PROCEDURE — 1036F TOBACCO NON-USER: CPT | Performed by: FAMILY MEDICINE

## 2021-09-02 PROCEDURE — 3075F SYST BP GE 130 - 139MM HG: CPT | Performed by: FAMILY MEDICINE

## 2021-09-02 RX ORDER — LISINOPRIL 20 MG/1
20 TABLET ORAL DAILY
Qty: 90 TABLET | Refills: 0 | Status: SHIPPED | OUTPATIENT
Start: 2021-09-02 | End: 2021-12-14 | Stop reason: SDUPTHER

## 2021-09-02 RX ORDER — ROSUVASTATIN CALCIUM 10 MG/1
10 TABLET, COATED ORAL DAILY
Qty: 90 TABLET | Refills: 0 | Status: SHIPPED | OUTPATIENT
Start: 2021-09-02 | End: 2022-03-23

## 2021-09-02 NOTE — Clinical Note
RTO in 3months with labs for f/u and annual exam with PCP   - please call to schedule RN visit for Flu vaccine and Pneumovax

## 2021-09-02 NOTE — PROGRESS NOTES
Virtual Regular Visit  Verification of patient location:  Patient is located in the following state in which I hold an active license [place]    Assessment/Plan:  Problem List Items Addressed This Visit        Endocrine    Type 2 diabetes mellitus with hyperglycemia, without long-term current use of insulin ([address] ) - Primary    Relevant Medications    metFORMIN (GLUCOPHAGE) 1000 MG tablet    Other Relevant Orders    HEMOGLOBIN A1C W/ EAG ESTIMATION    Ambulatory referral to Ophthalmology  - pt with A1c (7/14/2021): 9 0   - has been taking Metformin XR 500mg QD (was increased to 1000mg QD) but pt has only been taking 500mg QD  - will increase to Metformin 1000mg BID   - nml renal function   - nml urine microalbumin   - following with Bariatrics for weight loss   - overdue for annual DM eye exam - referred to Optho   - repeat labs in 3months and RTO for f/u and annual exam   - UTD with COVID IMMs  - due for Pneumovax - advised to schedule RN visit for IMM   - discussed annual Flu vaccine - advised to schedule RN visit for IMM        Cardiovascular and Mediastinum    Essential hypertension    Relevant Medications    lisinopril (ZESTRIL) 20 mg tablet  - BP at Bariatrics 138/82  - nml renal function   - nml urine microalbumin   - cont ACEI 20mg QD        Other    Morbid obesity (HCC)  - BMI 46 4  - follows with Bariatrics       Other Visit Diagnoses     Hyperlipidemia LDL goal <70        Relevant Medications    rosuvastatin (CRESTOR) 10 MG tablet    Other Relevant Orders    Lipid panel  - noted to have LDL of 113   - with h/o DM2, goal LDL less than 70   - will start on Crestor 10mg QHS and repeat labs in 3months      Elevated ALT measurement        Relevant Orders    Comprehensive metabolic panel    Venous insufficiency of both lower extremities      - advised to raise legs at night   - t/c adding compression stockings            Reason for visit is   Chief Complaint   Patient presents with    Follow-up    Virtual Regular Visit        Encounter provider Wilton Palacios DO  Provider located at [address] [id number]      Recent Visits  No visits were found meeting these conditions  Showing recent visits within past 7 days and meeting all other requirements  Today's Visits  Date Type Provider Dept   09/02/21 Telemedicine Tracey Colon DO [facility]   Showing today's visits and meeting all other requirements  Future Appointments  No visits were found meeting these conditions  Showing future appointments within next 150 days and meeting all other requirements       The patient was identified by name and date of birth  Monet Lopez was informed that this is a telemedicine visit and that the visit is being conducted through [facility] and patient was informed that this is a secure, HIPAA-compliant platform  He agrees to proceed     My office door was closed  No one else was in the room  He acknowledged consent and understanding of privacy and security of the video platform  The patient has agreed to participate and understands they can discontinue the visit at any time  Patient is aware this is a billable service  Subjective  Monet Lopez is a 50 y o  male who presents virtually for f/u of abnml labs      HPI   1) Labs (7/14/2021)   - nml Vit D  - nml CBC  - nml urine microalbumin   - Lipids: , , HDL 34,   - A1c = 9 0   - CMP with elevated FBS and ALT 70 -- pt states he drinks, on ave, 1-2 lite beers/week   2) DM2/HTN/HL/Obesity (BMI 46)   - pt's A1c spiked from 6 6 to 9 1 in 3/2021 and his Metformin was increased from 500mg QD to 1000mg QD (of note, pt has only been taking 500mg QD)   - most recent A1c (7/2021): 9 0   - nml urine microalbumin   - nml renal function   - is not on a statin   - working with [employer] re: weight loss    - denies F/C/N/V/CP/palpitations/SOB/wheezing/cough/abd pain  - pt's wife notes that he has LE swelling at night which is resolved in the AM     Past Medical History:   Diagnosis Date    Diabetes ([facility] )     Hypertension     Morbid obesity with BMI of 45 0-49 9, adult ([facility] )        Past Surgical History:   Procedure Laterality Date    WISDOM TOOTH EXTRACTION         Current Outpatient Medications   Medication Sig Dispense Refill    lisinopril (ZESTRIL) 20 mg tablet Take 1 tablet (20 mg total) by mouth daily 90 tablet 0    Multiple Vitamin (Multivitamin Adult) TABS Take 1 tablet by mouth daily      TREMFYA subcutaneous injection Inject 100 mg under the skin every 56 days       metFORMIN (GLUCOPHAGE) 1000 MG tablet Take 1 tablet (1,000 mg total) by mouth 2 (two) times a day with meals 180 tablet 0    rosuvastatin (CRESTOR) 10 MG tablet Take 1 tablet (10 mg total) by mouth daily 90 tablet 0     No current facility-administered medications for this visit  No Known Allergies    Review of Systems as per HPI     Video Exam  Vitals:    09/02/21 1035   BP: 138/82   Weight: (!) 151 kg (333 lb)   Height: 5' 11" (1 803 m)     Physical Exam   General: AAOx3, NAD, obese   HEENT: NC/AT, EOMI, clear conjunctiva, nml external ear and nose   Cardio: deferred  Pulm: no acute respiratory distress, able to talk in complete sentences w/o getting short of breath   Abd: deferred   Psych: nml mood/affect/behavior     I spent 25 minutes directly with the patient during this visit    VIRTUAL VISIT DISCLAIMER      Adam Patrickbridgetjuliane verbally agrees to participate in [employer]  Pt is aware that [employer] could be limited without vital signs or the ability to perform a full hands-on physical Caro Rinaldi understands he or the provider may request at any time to terminate the video visit and request the patient to seek care or treatment in person

## 2021-10-27 ENCOUNTER — TELEPHONE (OUTPATIENT)
Dept: FAMILY MEDICINE CLINIC | Facility: CLINIC | Age: 46
End: 2021-10-27

## 2021-12-13 ENCOUNTER — TELEMEDICINE (OUTPATIENT)
Dept: FAMILY MEDICINE CLINIC | Facility: CLINIC | Age: 46
End: 2021-12-13
Payer: COMMERCIAL

## 2021-12-13 VITALS — BODY MASS INDEX: 44.1 KG/M2 | WEIGHT: 315 LBS | HEIGHT: 71 IN

## 2021-12-13 DIAGNOSIS — B34.9 VIRAL INFECTION, UNSPECIFIED: Primary | ICD-10-CM

## 2021-12-13 DIAGNOSIS — J01.90 ACUTE SINUSITIS, RECURRENCE NOT SPECIFIED, UNSPECIFIED LOCATION: ICD-10-CM

## 2021-12-13 PROCEDURE — 1036F TOBACCO NON-USER: CPT | Performed by: NURSE PRACTITIONER

## 2021-12-13 PROCEDURE — U0003 INFECTIOUS AGENT DETECTION BY NUCLEIC ACID (DNA OR RNA); SEVERE ACUTE RESPIRATORY SYNDROME CORONAVIRUS 2 (SARS-COV-2) (CORONAVIRUS DISEASE [COVID-19]), AMPLIFIED PROBE TECHNIQUE, MAKING USE OF HIGH THROUGHPUT TECHNOLOGIES AS DESCRIBED BY CMS-2020-01-R: HCPCS | Performed by: NURSE PRACTITIONER

## 2021-12-13 PROCEDURE — 3008F BODY MASS INDEX DOCD: CPT | Performed by: NURSE PRACTITIONER

## 2021-12-13 PROCEDURE — 99213 OFFICE O/P EST LOW 20 MIN: CPT | Performed by: NURSE PRACTITIONER

## 2021-12-13 PROCEDURE — 3725F SCREEN DEPRESSION PERFORMED: CPT | Performed by: NURSE PRACTITIONER

## 2021-12-13 PROCEDURE — U0005 INFEC AGEN DETEC AMPLI PROBE: HCPCS | Performed by: NURSE PRACTITIONER

## 2021-12-13 RX ORDER — AMOXICILLIN AND CLAVULANATE POTASSIUM 875; 125 MG/1; MG/1
1 TABLET, FILM COATED ORAL EVERY 12 HOURS SCHEDULED
Qty: 14 TABLET | Refills: 0 | Status: SHIPPED | OUTPATIENT
Start: 2021-12-13 | End: 2021-12-20

## 2021-12-14 DIAGNOSIS — I10 ESSENTIAL HYPERTENSION: ICD-10-CM

## 2021-12-14 DIAGNOSIS — E11.65 TYPE 2 DIABETES MELLITUS WITH HYPERGLYCEMIA, WITHOUT LONG-TERM CURRENT USE OF INSULIN (HCC): ICD-10-CM

## 2021-12-14 PROCEDURE — 4010F ACE/ARB THERAPY RXD/TAKEN: CPT | Performed by: NURSE PRACTITIONER

## 2021-12-14 RX ORDER — LISINOPRIL 20 MG/1
20 TABLET ORAL DAILY
Qty: 90 TABLET | Refills: 0 | Status: SHIPPED | OUTPATIENT
Start: 2021-12-14 | End: 2022-03-23 | Stop reason: SDUPTHER

## 2021-12-15 ENCOUNTER — TELEPHONE (OUTPATIENT)
Dept: FAMILY MEDICINE CLINIC | Facility: CLINIC | Age: 46
End: 2021-12-15

## 2022-03-15 ENCOUNTER — APPOINTMENT (OUTPATIENT)
Dept: LAB | Facility: CLINIC | Age: 47
End: 2022-03-15
Payer: COMMERCIAL

## 2022-03-15 DIAGNOSIS — I10 ESSENTIAL HYPERTENSION: ICD-10-CM

## 2022-03-15 DIAGNOSIS — E78.5 HYPERLIPIDEMIA LDL GOAL <70: ICD-10-CM

## 2022-03-15 DIAGNOSIS — E87.1 HYPONATREMIA: ICD-10-CM

## 2022-03-15 DIAGNOSIS — R74.01 ELEVATED ALT MEASUREMENT: ICD-10-CM

## 2022-03-15 DIAGNOSIS — E78.1 HYPERTRIGLYCERIDEMIA: ICD-10-CM

## 2022-03-15 DIAGNOSIS — E66.01 MORBID OBESITY (HCC): ICD-10-CM

## 2022-03-15 DIAGNOSIS — E11.65 TYPE 2 DIABETES MELLITUS WITH HYPERGLYCEMIA, WITHOUT LONG-TERM CURRENT USE OF INSULIN (HCC): ICD-10-CM

## 2022-03-15 DIAGNOSIS — E55.9 VITAMIN D DEFICIENCY: ICD-10-CM

## 2022-03-15 LAB
25(OH)D3 SERPL-MCNC: 24.7 NG/ML (ref 30–100)
ALBUMIN SERPL BCP-MCNC: 4.2 G/DL (ref 3.5–5)
ALP SERPL-CCNC: 87 U/L (ref 46–116)
ALT SERPL W P-5'-P-CCNC: 58 U/L (ref 12–78)
ANION GAP SERPL CALCULATED.3IONS-SCNC: 4 MMOL/L (ref 4–13)
AST SERPL W P-5'-P-CCNC: 29 U/L (ref 5–45)
BASOPHILS # BLD AUTO: 0.08 THOUSANDS/ΜL (ref 0–0.1)
BASOPHILS NFR BLD AUTO: 1 % (ref 0–1)
BILIRUB SERPL-MCNC: 0.59 MG/DL (ref 0.2–1)
BUN SERPL-MCNC: 18 MG/DL (ref 5–25)
CALCIUM SERPL-MCNC: 9.7 MG/DL (ref 8.3–10.1)
CHLORIDE SERPL-SCNC: 102 MMOL/L (ref 100–108)
CHOLEST SERPL-MCNC: 159 MG/DL
CO2 SERPL-SCNC: 28 MMOL/L (ref 21–32)
CREAT SERPL-MCNC: 0.94 MG/DL (ref 0.6–1.3)
EOSINOPHIL # BLD AUTO: 0.36 THOUSAND/ΜL (ref 0–0.61)
EOSINOPHIL NFR BLD AUTO: 4 % (ref 0–6)
ERYTHROCYTE [DISTWIDTH] IN BLOOD BY AUTOMATED COUNT: 13.5 % (ref 11.6–15.1)
EST. AVERAGE GLUCOSE BLD GHB EST-MCNC: 157 MG/DL
GFR SERPL CREATININE-BSD FRML MDRD: 95 ML/MIN/1.73SQ M
GLUCOSE P FAST SERPL-MCNC: 179 MG/DL (ref 65–99)
HBA1C MFR BLD: 7.1 %
HCT VFR BLD AUTO: 45.9 % (ref 36.5–49.3)
HDLC SERPL-MCNC: 35 MG/DL
HGB BLD-MCNC: 15.2 G/DL (ref 12–17)
IMM GRANULOCYTES # BLD AUTO: 0.03 THOUSAND/UL (ref 0–0.2)
IMM GRANULOCYTES NFR BLD AUTO: 0 % (ref 0–2)
LDLC SERPL CALC-MCNC: 89 MG/DL (ref 0–100)
LYMPHOCYTES # BLD AUTO: 2.99 THOUSANDS/ΜL (ref 0.6–4.47)
LYMPHOCYTES NFR BLD AUTO: 35 % (ref 14–44)
MCH RBC QN AUTO: 27.9 PG (ref 26.8–34.3)
MCHC RBC AUTO-ENTMCNC: 33.1 G/DL (ref 31.4–37.4)
MCV RBC AUTO: 84 FL (ref 82–98)
MONOCYTES # BLD AUTO: 0.7 THOUSAND/ΜL (ref 0.17–1.22)
MONOCYTES NFR BLD AUTO: 8 % (ref 4–12)
NEUTROPHILS # BLD AUTO: 4.34 THOUSANDS/ΜL (ref 1.85–7.62)
NEUTS SEG NFR BLD AUTO: 52 % (ref 43–75)
NRBC BLD AUTO-RTO: 0 /100 WBCS
PLATELET # BLD AUTO: 254 THOUSANDS/UL (ref 149–390)
PMV BLD AUTO: 9.6 FL (ref 8.9–12.7)
POTASSIUM SERPL-SCNC: 4.5 MMOL/L (ref 3.5–5.3)
PROT SERPL-MCNC: 7.8 G/DL (ref 6.4–8.2)
RBC # BLD AUTO: 5.45 MILLION/UL (ref 3.88–5.62)
SODIUM SERPL-SCNC: 134 MMOL/L (ref 136–145)
TRIGL SERPL-MCNC: 175 MG/DL
WBC # BLD AUTO: 8.5 THOUSAND/UL (ref 4.31–10.16)

## 2022-03-15 PROCEDURE — 36415 COLL VENOUS BLD VENIPUNCTURE: CPT

## 2022-03-15 PROCEDURE — 3051F HG A1C>EQUAL 7.0%<8.0%: CPT | Performed by: NURSE PRACTITIONER

## 2022-03-15 PROCEDURE — 83036 HEMOGLOBIN GLYCOSYLATED A1C: CPT

## 2022-03-15 PROCEDURE — 82306 VITAMIN D 25 HYDROXY: CPT

## 2022-03-15 PROCEDURE — 85025 COMPLETE CBC W/AUTO DIFF WBC: CPT

## 2022-03-15 PROCEDURE — 80061 LIPID PANEL: CPT

## 2022-03-15 PROCEDURE — 80053 COMPREHEN METABOLIC PANEL: CPT

## 2022-03-16 ENCOUNTER — TELEPHONE (OUTPATIENT)
Dept: FAMILY MEDICINE CLINIC | Facility: CLINIC | Age: 47
End: 2022-03-16

## 2022-03-16 NOTE — TELEPHONE ENCOUNTER
----- Message from Alee Correa sent at 3/16/2022 12:16 PM EDT -----  Please schedule patient for a follow-up to review his lab results

## 2022-03-23 ENCOUNTER — OFFICE VISIT (OUTPATIENT)
Dept: FAMILY MEDICINE CLINIC | Facility: CLINIC | Age: 47
End: 2022-03-23
Payer: COMMERCIAL

## 2022-03-23 VITALS
WEIGHT: 315 LBS | SYSTOLIC BLOOD PRESSURE: 146 MMHG | RESPIRATION RATE: 18 BRPM | DIASTOLIC BLOOD PRESSURE: 82 MMHG | HEIGHT: 71 IN | BODY MASS INDEX: 44.1 KG/M2 | OXYGEN SATURATION: 99 % | HEART RATE: 94 BPM

## 2022-03-23 DIAGNOSIS — E66.01 MORBID OBESITY (HCC): ICD-10-CM

## 2022-03-23 DIAGNOSIS — E78.2 MIXED HYPERLIPIDEMIA: ICD-10-CM

## 2022-03-23 DIAGNOSIS — E11.9 TYPE 2 DIABETES MELLITUS WITHOUT COMPLICATION, WITHOUT LONG-TERM CURRENT USE OF INSULIN (HCC): ICD-10-CM

## 2022-03-23 DIAGNOSIS — E55.9 VITAMIN D DEFICIENCY: ICD-10-CM

## 2022-03-23 DIAGNOSIS — I10 ESSENTIAL HYPERTENSION: Primary | ICD-10-CM

## 2022-03-23 DIAGNOSIS — L40.9 PSORIASIS: ICD-10-CM

## 2022-03-23 PROBLEM — L40.0 PLAQUE PSORIASIS: Status: ACTIVE | Noted: 2019-05-29

## 2022-03-23 PROBLEM — B34.9 VIRAL INFECTION, UNSPECIFIED: Status: RESOLVED | Noted: 2021-05-27 | Resolved: 2022-03-23

## 2022-03-23 PROBLEM — J01.10 ACUTE NON-RECURRENT FRONTAL SINUSITIS: Status: RESOLVED | Noted: 2021-05-24 | Resolved: 2022-03-23

## 2022-03-23 PROBLEM — J01.90 ACUTE SINUSITIS: Status: RESOLVED | Noted: 2021-12-13 | Resolved: 2022-03-23

## 2022-03-23 PROBLEM — Z20.822 EXPOSURE TO COVID-19 VIRUS: Status: RESOLVED | Noted: 2021-05-24 | Resolved: 2022-03-23

## 2022-03-23 PROCEDURE — 1036F TOBACCO NON-USER: CPT | Performed by: NURSE PRACTITIONER

## 2022-03-23 PROCEDURE — 99214 OFFICE O/P EST MOD 30 MIN: CPT | Performed by: NURSE PRACTITIONER

## 2022-03-23 PROCEDURE — 3725F SCREEN DEPRESSION PERFORMED: CPT | Performed by: NURSE PRACTITIONER

## 2022-03-23 PROCEDURE — 4010F ACE/ARB THERAPY RXD/TAKEN: CPT | Performed by: NURSE PRACTITIONER

## 2022-03-23 PROCEDURE — 3008F BODY MASS INDEX DOCD: CPT | Performed by: NURSE PRACTITIONER

## 2022-03-23 PROCEDURE — 3079F DIAST BP 80-89 MM HG: CPT | Performed by: NURSE PRACTITIONER

## 2022-03-23 PROCEDURE — 3077F SYST BP >= 140 MM HG: CPT | Performed by: NURSE PRACTITIONER

## 2022-03-23 RX ORDER — LISINOPRIL 10 MG/1
10 TABLET ORAL DAILY
Qty: 90 TABLET | Refills: 1 | Status: SHIPPED | OUTPATIENT
Start: 2022-03-23

## 2022-03-23 RX ORDER — ROSUVASTATIN CALCIUM 5 MG/1
5 TABLET, COATED ORAL DAILY
Qty: 90 TABLET | Refills: 0 | Status: SHIPPED | OUTPATIENT
Start: 2022-03-23

## 2022-03-23 RX ORDER — LISINOPRIL 20 MG/1
20 TABLET ORAL DAILY
Qty: 90 TABLET | Refills: 1 | Status: SHIPPED | OUTPATIENT
Start: 2022-03-23

## 2022-03-23 NOTE — PROGRESS NOTES
Assessment/Plan:      Diagnoses and all orders for this visit:    Essential hypertension  -     lisinopril (ZESTRIL) 10 mg tablet; Take 1 tablet (10 mg total) by mouth daily  -     lisinopril (ZESTRIL) 20 mg tablet; Take 1 tablet (20 mg total) by mouth daily  -     Comprehensive metabolic panel; Future    /82  Increased lisinopril to 30mg daily  Repeat CMP in 1 month  Mixed hyperlipidemia  -     rosuvastatin (CRESTOR) 5 mg tablet; Take 1 tablet (5 mg total) by mouth daily    Total cholesterol 159, Triglycerides 175, and LDL 89  Crestor 5mg daily prescribed  Medication information and side effects reviewed  Repeat lipid panel in 3 months  Type 2 diabetes mellitus without complication, without long-term current use of insulin (HCC)  -     Diabetic foot exam; Future  -     metFORMIN (GLUCOPHAGE) 1000 MG tablet; Take 1 tablet (1,000 mg total) by mouth 2 (two) times a day with meals  -     Comprehensive metabolic panel; Future    Hemoglobin A1C 7 1  Continue metformin 1,000mg BID  Patient instructed to eat a healthy diabetic diet  Repeat hemoglobin A1C in 3 months  Morbid obesity ([address] )  Continue to follow-up with weight management  Psoriasis  Continue to follow-up with dermatology  Vitamin D deficiency  Vitamin D level 24 7  Patient instructed to take 2,000 units of vitamin D daily OTC  Patient instructed to follow-up in 1 month for a BP check or sooner prn  Subjective:     Patient ID: Harley Lewis is a 48 y o  male  Patient is here for a follow-up for chronic medical conditions  Patient reports that he feels well  Patient is here for a follow-up for type 2 DM  Patient takes metformin 1,000mg BID  Patient reports that he watches his diet  Patient follows up with weight management for obesity  Patient reports that he swims 3 times a week  Patient reports that he walks a couple times a week  Patient reports that he watches his diet     Patient is here for a follow-up for hyperlipidemia  Patient reports that he got his lab work done  Patient reports that he is not taking the crestor right now  Patient follows up with dermatology for psoriasis  Review of Systems   Constitutional: Negative for chills and fever  HENT: Negative for congestion, ear pain, sinus pressure and sore throat  Respiratory: Negative for cough, chest tightness, shortness of breath and wheezing  Cardiovascular: Negative for chest pain, palpitations and leg swelling  Gastrointestinal: Negative for abdominal pain, blood in stool, diarrhea, nausea and vomiting  Genitourinary: Negative for dysuria, frequency and hematuria  Skin: Negative for rash  Neurological: Negative for dizziness, seizures, syncope, light-headedness and headaches  Objective:     Physical Exam  Vitals reviewed  Constitutional:       General: He is not in acute distress  Appearance: He is obese  He is not ill-appearing or diaphoretic  HENT:      Right Ear: External ear normal       Left Ear: External ear normal       Mouth/Throat:      Mouth: Mucous membranes are moist       Pharynx: Oropharynx is clear  No posterior oropharyngeal erythema  Eyes:      Conjunctiva/sclera: Conjunctivae normal       Pupils: Pupils are equal, round, and reactive to light  Cardiovascular:      Rate and Rhythm: Normal rate and regular rhythm  Pulses: Normal pulses  no weak pulses          Dorsalis pedis pulses are 2+ on the right side and 2+ on the left side  Heart sounds: Normal heart sounds  Comments: No edema noted  Pulmonary:      Effort: Pulmonary effort is normal  No respiratory distress  Breath sounds: Normal breath sounds  No wheezing  Musculoskeletal:      Comments: Gait wnl  Feet:      Right foot:      Skin integrity: No ulcer, skin breakdown, erythema, warmth or callus  Left foot:      Skin integrity: No ulcer, skin breakdown, erythema, warmth or callus     Skin: Findings: No rash  Neurological:      Mental Status: He is alert and oriented to person, place, and time  Psychiatric:         Mood and Affect: Mood normal        Patient's shoes and socks removed  Right Foot/Ankle   Right Foot Inspection  Skin Exam: skin normal and skin intact  No warmth, no callus, no erythema, no maceration, no abnormal color, no pre-ulcer, no ulcer and no callus  Toe Exam: ROM and strength within normal limits  No swelling, no tenderness, erythema and  no right toe deformity    Sensory   Monofilament testing: intact    Vascular  Capillary refills: < 3 seconds  The right DP pulse is 2+  Left Foot/Ankle  Left Foot Inspection  Skin Exam: skin normal and skin intact  No warmth, no erythema, no maceration, normal color, no pre-ulcer, no ulcer and no callus  Toe Exam: ROM and strength within normal limits  No swelling, no tenderness, no erythema and no left toe deformity  Sensory   Monofilament testing: intact    Vascular  Capillary refills: < 3 seconds  The left DP pulse is 2+       Assign Risk Category  No deformity present  No loss of protective sensation  No weak pulses  Risk: 0

## 2022-04-04 ENCOUNTER — APPOINTMENT (OUTPATIENT)
Dept: LAB | Facility: CLINIC | Age: 47
End: 2022-04-04
Payer: COMMERCIAL

## 2022-04-04 LAB
CREAT UR-MCNC: 110 MG/DL
MICROALBUMIN UR-MCNC: 11.7 MG/L (ref 0–20)
MICROALBUMIN/CREAT 24H UR: 11 MG/G CREATININE (ref 0–30)

## 2022-04-04 PROCEDURE — 82043 UR ALBUMIN QUANTITATIVE: CPT

## 2022-04-04 PROCEDURE — 82570 ASSAY OF URINE CREATININE: CPT

## 2022-04-04 PROCEDURE — 3061F NEG MICROALBUMINURIA REV: CPT | Performed by: NURSE PRACTITIONER

## 2022-05-10 ENCOUNTER — TELEPHONE (OUTPATIENT)
Dept: FAMILY MEDICINE CLINIC | Facility: CLINIC | Age: 47
End: 2022-05-10

## 2022-05-10 NOTE — TELEPHONE ENCOUNTER
London Ramsay has an appt with Kiya Aguila on 5/11  States he had his labs done at [address] [place] [address] on [address] [place] [address]

## 2022-05-11 ENCOUNTER — OFFICE VISIT (OUTPATIENT)
Dept: FAMILY MEDICINE CLINIC | Facility: CLINIC | Age: 47
End: 2022-05-11
Payer: COMMERCIAL

## 2022-05-11 VITALS
RESPIRATION RATE: 16 BRPM | SYSTOLIC BLOOD PRESSURE: 134 MMHG | HEART RATE: 80 BPM | BODY MASS INDEX: 44.1 KG/M2 | OXYGEN SATURATION: 99 % | WEIGHT: 315 LBS | HEIGHT: 71 IN | DIASTOLIC BLOOD PRESSURE: 80 MMHG

## 2022-05-11 DIAGNOSIS — E11.9 TYPE 2 DIABETES MELLITUS WITHOUT COMPLICATION, WITHOUT LONG-TERM CURRENT USE OF INSULIN (HCC): ICD-10-CM

## 2022-05-11 DIAGNOSIS — E66.01 MORBID OBESITY (HCC): ICD-10-CM

## 2022-05-11 DIAGNOSIS — R74.01 ELEVATED ALT MEASUREMENT: ICD-10-CM

## 2022-05-11 DIAGNOSIS — Z12.11 SCREENING FOR COLON CANCER: ICD-10-CM

## 2022-05-11 DIAGNOSIS — E78.2 MIXED HYPERLIPIDEMIA: ICD-10-CM

## 2022-05-11 DIAGNOSIS — I10 ESSENTIAL HYPERTENSION: Primary | ICD-10-CM

## 2022-05-11 DIAGNOSIS — E55.9 VITAMIN D DEFICIENCY: ICD-10-CM

## 2022-05-11 PROCEDURE — 3008F BODY MASS INDEX DOCD: CPT | Performed by: NURSE PRACTITIONER

## 2022-05-11 PROCEDURE — 3079F DIAST BP 80-89 MM HG: CPT | Performed by: NURSE PRACTITIONER

## 2022-05-11 PROCEDURE — 3075F SYST BP GE 130 - 139MM HG: CPT | Performed by: NURSE PRACTITIONER

## 2022-05-11 PROCEDURE — 1036F TOBACCO NON-USER: CPT | Performed by: NURSE PRACTITIONER

## 2022-05-11 PROCEDURE — 99214 OFFICE O/P EST MOD 30 MIN: CPT | Performed by: NURSE PRACTITIONER

## 2022-05-11 RX ORDER — CHOLECALCIFEROL (VITAMIN D3) 50 MCG
CAPSULE ORAL
COMMUNITY

## 2022-05-11 NOTE — PROGRESS NOTES
Assessment/Plan:      Diagnoses and all orders for this visit:    Essential hypertension  -     Comprehensive metabolic panel; Future  -     CBC and differential; Future  -     Lipid Panel with Direct LDL reflex; Future  -     TSH, 3rd generation with Free T4 reflex; Future    /80  Continue lisinopril 30mg daily  Mixed hyperlipidemia  -     Comprehensive metabolic panel; Future  -     Lipid Panel with Direct LDL reflex; Future  -     TSH, 3rd generation with Free T4 reflex; Future    Continue crestor 5mg daily  Lipid panel ordered  Type 2 diabetes mellitus without complication, without long-term current use of insulin (HCC)  -     Comprehensive metabolic panel; Future  -     CBC and differential; Future  -     Lipid Panel with Direct LDL reflex; Future  -     HEMOGLOBIN A1C W/ EAG ESTIMATION; Future    Continue metformin 1,000mg BID  Patient instructed to eat a healthy diabetic diet  Hemoglobin A1c ordered  Morbid obesity ([facility] )  -     Comprehensive metabolic panel; Future  -     CBC and differential; Future  -     Lipid Panel with Direct LDL reflex; Future  -     HEMOGLOBIN A1C W/ EAG ESTIMATION; Future  -     TSH, 3rd generation with Free T4 reflex; Future    Continue to follow-up with weight management  Vitamin D deficiency  -     Vitamin D 25 hydroxy; Future    Vitamin D level ordered  Elevated ALT measurement  ALT is slightly elevated  Repeat CMP ordered  Screening for colon cancer  -     Ambulatory referral for colonoscopy; Future    Patient instructed to follow-up in 6 weeks or sooner prn  Subjective:     Patient ID: Kirsty Leyva is a 48 y o  male  Patient is here for a follow-up for chronic medical conditions  Patient is here for a follow-up for HTN  Patient takes lisinopril 30mg daily  Denies any chest pain, SOB, palpitations, dizziness, or Has  Patient is here for a follow-up for hyperlipidemia and obesity  Patient reports that he watches his diet  Patient reports that he takes the crestor 5mg daily  Patient reports that he follows up with weight management  Patient is here for a follow-up for DM 2  Patient reports that he takes metformin 1,000mg BID  Denies any nausea or vomiting  Denies any vision changes  Patient reports that he takes a vitamin D supplement OTC for vitamin D deficiency  Review of Systems   Constitutional: Negative for chills and fever  HENT: Negative for congestion, ear pain and sore throat  Respiratory: Negative for cough, chest tightness, shortness of breath and wheezing  Cardiovascular: Negative for chest pain and palpitations  Gastrointestinal: Negative for abdominal pain, diarrhea, nausea and vomiting  Skin: Negative for rash  Neurological: Negative for dizziness, seizures, syncope and light-headedness  Objective:     Physical Exam  Vitals reviewed  Constitutional:       General: He is not in acute distress  Appearance: He is obese  He is not ill-appearing or diaphoretic  HENT:      Right Ear: External ear normal       Left Ear: External ear normal    Cardiovascular:      Rate and Rhythm: Normal rate and regular rhythm  Pulses: Normal pulses  Heart sounds: Normal heart sounds  Comments: No edema noted  Pulmonary:      Effort: Pulmonary effort is normal  No respiratory distress  Breath sounds: Normal breath sounds  No wheezing  Musculoskeletal:      Comments: Gait wnl  Skin:     Findings: No rash  Neurological:      Mental Status: He is alert and oriented to person, place, and time     Psychiatric:         Mood and Affect: Mood normal

## 2022-05-16 DIAGNOSIS — Z20.822 EXPOSURE TO COVID-19 VIRUS: ICD-10-CM

## 2022-05-16 DIAGNOSIS — B34.9 VIRAL INFECTION, UNSPECIFIED: ICD-10-CM

## 2022-05-16 PROCEDURE — U0005 INFEC AGEN DETEC AMPLI PROBE: HCPCS | Performed by: NURSE PRACTITIONER

## 2022-05-16 PROCEDURE — U0003 INFECTIOUS AGENT DETECTION BY NUCLEIC ACID (DNA OR RNA); SEVERE ACUTE RESPIRATORY SYNDROME CORONAVIRUS 2 (SARS-COV-2) (CORONAVIRUS DISEASE [COVID-19]), AMPLIFIED PROBE TECHNIQUE, MAKING USE OF HIGH THROUGHPUT TECHNOLOGIES AS DESCRIBED BY CMS-2020-01-R: HCPCS | Performed by: NURSE PRACTITIONER

## 2022-05-17 LAB — SARS-COV-2 RNA RESP QL NAA+PROBE: NEGATIVE

## 2022-06-20 LAB
LEFT EYE DIABETIC RETINOPATHY: NORMAL
RIGHT EYE DIABETIC RETINOPATHY: NORMAL

## 2022-06-22 ENCOUNTER — OFFICE VISIT (OUTPATIENT)
Dept: FAMILY MEDICINE CLINIC | Facility: CLINIC | Age: 47
End: 2022-06-22
Payer: COMMERCIAL

## 2022-06-22 VITALS
BODY MASS INDEX: 44.1 KG/M2 | HEART RATE: 82 BPM | DIASTOLIC BLOOD PRESSURE: 84 MMHG | WEIGHT: 315 LBS | OXYGEN SATURATION: 98 % | SYSTOLIC BLOOD PRESSURE: 134 MMHG | RESPIRATION RATE: 16 BRPM | HEIGHT: 71 IN

## 2022-06-22 DIAGNOSIS — M25.561 PAIN IN BOTH KNEES, UNSPECIFIED CHRONICITY: Primary | ICD-10-CM

## 2022-06-22 DIAGNOSIS — M25.562 PAIN IN BOTH KNEES, UNSPECIFIED CHRONICITY: Primary | ICD-10-CM

## 2022-06-22 PROCEDURE — 1036F TOBACCO NON-USER: CPT | Performed by: NURSE PRACTITIONER

## 2022-06-22 PROCEDURE — 99213 OFFICE O/P EST LOW 20 MIN: CPT | Performed by: NURSE PRACTITIONER

## 2022-06-22 PROCEDURE — 3008F BODY MASS INDEX DOCD: CPT | Performed by: NURSE PRACTITIONER

## 2022-06-22 PROCEDURE — 3075F SYST BP GE 130 - 139MM HG: CPT | Performed by: NURSE PRACTITIONER

## 2022-06-22 PROCEDURE — 3079F DIAST BP 80-89 MM HG: CPT | Performed by: NURSE PRACTITIONER

## 2022-06-22 NOTE — PROGRESS NOTES
Assessment/Plan:      Diagnoses and all orders for this visit:    Pain in both knees, unspecified chronicity  -     Diclofenac Sodium (VOLTAREN) 1 %; Apply 2 g topically 4 (four) times a day  -     XR knee 3 vw left non injury; Future  -     XR knee 3 vw right non injury; Future      Patient reports occasional bilateral knee pain for the past few months  Denies any injury  Patient reports that the pain is worse after sitting for awhile  No redness or swelling noted  Discussed with the patient that the pain is most likely caused by arthritis  Xrays of both knee ordered  Will follow-up results with the patient  Patient reports that he is traveling to [place]  Patient instructed not to take high dose ibuprofen OTC  Voltaren gel prescribed  Medication information and side effects reviewed  Patient instructed to follow-up in 2 weeks or sooner prn  Subjective:     Patient ID: Jimmy Polo is a 48 y o  male  Patient reports occasional bilateral knee pain for the past few months  Patient reports that the pain is worse after sitting for awhile  Denies any injury  Denies any swelling or redness  Patient reports that he takes advil 800mg OTC prn for severe pain and it helps  Patient reports that the pain is not going away and he is traveling to [place]  Review of Systems   Constitutional: Negative for chills and fever  HENT: Negative for congestion, ear pain and sore throat  Respiratory: Negative for cough, chest tightness and shortness of breath  Cardiovascular: Negative for chest pain  Gastrointestinal: Negative for abdominal pain, diarrhea, nausea and vomiting  Musculoskeletal:        As noted in HPI  Skin: Negative for rash  Neurological: Negative for dizziness, syncope, light-headedness and headaches  Objective:     Physical Exam  Vitals reviewed  Constitutional:       General: He is not in acute distress  Appearance: He is obese  He is not ill-appearing or diaphoretic  Cardiovascular:      Rate and Rhythm: Normal rate and regular rhythm  Pulses: Normal pulses  Heart sounds: Normal heart sounds  Pulmonary:      Effort: Pulmonary effort is normal  No respiratory distress  Breath sounds: Normal breath sounds  No wheezing  Musculoskeletal:      Comments: Gait wnl  No tenderness noted on palpation of the right and left knee regions  No redness or swelling noted  Good ROM of both knees  Skin:     Findings: No rash  Neurological:      Mental Status: He is alert and oriented to person, place, and time     Psychiatric:         Mood and Affect: Mood normal

## 2022-08-03 ENCOUNTER — APPOINTMENT (OUTPATIENT)
Dept: RADIOLOGY | Facility: MEDICAL CENTER | Age: 47
End: 2022-08-03
Payer: COMMERCIAL

## 2022-08-03 DIAGNOSIS — M25.561 PAIN IN BOTH KNEES, UNSPECIFIED CHRONICITY: ICD-10-CM

## 2022-08-03 DIAGNOSIS — M25.562 PAIN IN BOTH KNEES, UNSPECIFIED CHRONICITY: ICD-10-CM

## 2022-08-03 PROCEDURE — 73562 X-RAY EXAM OF KNEE 3: CPT

## 2022-08-08 ENCOUNTER — TELEPHONE (OUTPATIENT)
Dept: FAMILY MEDICINE CLINIC | Facility: CLINIC | Age: 47
End: 2022-08-08

## 2022-08-08 DIAGNOSIS — M25.562 PAIN IN BOTH KNEES, UNSPECIFIED CHRONICITY: Primary | ICD-10-CM

## 2022-08-08 DIAGNOSIS — M25.561 PAIN IN BOTH KNEES, UNSPECIFIED CHRONICITY: Primary | ICD-10-CM

## 2022-08-08 NOTE — TELEPHONE ENCOUNTER
----- Message from Sadiq Cai sent at 8/8/2022  1:00 PM EDT -----  Please let the patient know that his xray showed arthritis in his right knee  I put a referral in for [facility] ortho

## 2022-09-12 ENCOUNTER — APPOINTMENT (OUTPATIENT)
Dept: LAB | Facility: CLINIC | Age: 47
End: 2022-09-12
Payer: COMMERCIAL

## 2022-09-12 DIAGNOSIS — E78.2 MIXED HYPERLIPIDEMIA: ICD-10-CM

## 2022-09-12 DIAGNOSIS — E55.9 VITAMIN D DEFICIENCY: ICD-10-CM

## 2022-09-12 DIAGNOSIS — E66.01 MORBID OBESITY (HCC): ICD-10-CM

## 2022-09-12 DIAGNOSIS — E11.9 TYPE 2 DIABETES MELLITUS WITHOUT COMPLICATION, WITHOUT LONG-TERM CURRENT USE OF INSULIN (HCC): ICD-10-CM

## 2022-09-12 DIAGNOSIS — I10 ESSENTIAL HYPERTENSION: ICD-10-CM

## 2022-09-12 LAB
25(OH)D3 SERPL-MCNC: 28.7 NG/ML (ref 30–100)
ALBUMIN SERPL BCP-MCNC: 4.4 G/DL (ref 3.5–5)
ALP SERPL-CCNC: 67 U/L (ref 34–104)
ALT SERPL W P-5'-P-CCNC: 49 U/L (ref 7–52)
ANION GAP SERPL CALCULATED.3IONS-SCNC: 8 MMOL/L (ref 4–13)
AST SERPL W P-5'-P-CCNC: 26 U/L (ref 13–39)
BASOPHILS # BLD AUTO: 0.08 THOUSANDS/ΜL (ref 0–0.1)
BASOPHILS NFR BLD AUTO: 1 % (ref 0–1)
BILIRUB SERPL-MCNC: 0.68 MG/DL (ref 0.2–1)
BUN SERPL-MCNC: 18 MG/DL (ref 5–25)
CALCIUM SERPL-MCNC: 9.7 MG/DL (ref 8.4–10.2)
CHLORIDE SERPL-SCNC: 100 MMOL/L (ref 96–108)
CHOLEST SERPL-MCNC: 167 MG/DL
CO2 SERPL-SCNC: 25 MMOL/L (ref 21–32)
CREAT SERPL-MCNC: 0.9 MG/DL (ref 0.6–1.3)
EOSINOPHIL # BLD AUTO: 0.32 THOUSAND/ΜL (ref 0–0.61)
EOSINOPHIL NFR BLD AUTO: 4 % (ref 0–6)
ERYTHROCYTE [DISTWIDTH] IN BLOOD BY AUTOMATED COUNT: 13.4 % (ref 11.6–15.1)
EST. AVERAGE GLUCOSE BLD GHB EST-MCNC: 200 MG/DL
GFR SERPL CREATININE-BSD FRML MDRD: 100 ML/MIN/1.73SQ M
GLUCOSE P FAST SERPL-MCNC: 212 MG/DL (ref 65–99)
HBA1C MFR BLD: 8.6 %
HCT VFR BLD AUTO: 43.8 % (ref 36.5–49.3)
HDLC SERPL-MCNC: 37 MG/DL
HGB BLD-MCNC: 15.5 G/DL (ref 12–17)
IMM GRANULOCYTES # BLD AUTO: 0.04 THOUSAND/UL (ref 0–0.2)
IMM GRANULOCYTES NFR BLD AUTO: 0 % (ref 0–2)
LDLC SERPL CALC-MCNC: 85 MG/DL (ref 0–100)
LYMPHOCYTES # BLD AUTO: 3.28 THOUSANDS/ΜL (ref 0.6–4.47)
LYMPHOCYTES NFR BLD AUTO: 36 % (ref 14–44)
MCH RBC QN AUTO: 28.8 PG (ref 26.8–34.3)
MCHC RBC AUTO-ENTMCNC: 35.4 G/DL (ref 31.4–37.4)
MCV RBC AUTO: 81 FL (ref 82–98)
MONOCYTES # BLD AUTO: 0.69 THOUSAND/ΜL (ref 0.17–1.22)
MONOCYTES NFR BLD AUTO: 8 % (ref 4–12)
NEUTROPHILS # BLD AUTO: 4.77 THOUSANDS/ΜL (ref 1.85–7.62)
NEUTS SEG NFR BLD AUTO: 51 % (ref 43–75)
NRBC BLD AUTO-RTO: 0 /100 WBCS
PLATELET # BLD AUTO: 259 THOUSANDS/UL (ref 149–390)
PMV BLD AUTO: 9.2 FL (ref 8.9–12.7)
POTASSIUM SERPL-SCNC: 4.7 MMOL/L (ref 3.5–5.3)
PROT SERPL-MCNC: 7.5 G/DL (ref 6.4–8.4)
RBC # BLD AUTO: 5.39 MILLION/UL (ref 3.88–5.62)
SODIUM SERPL-SCNC: 133 MMOL/L (ref 135–147)
TRIGL SERPL-MCNC: 227 MG/DL
TSH SERPL DL<=0.05 MIU/L-ACNC: 3.23 UIU/ML (ref 0.45–4.5)
WBC # BLD AUTO: 9.18 THOUSAND/UL (ref 4.31–10.16)

## 2022-09-12 PROCEDURE — 84443 ASSAY THYROID STIM HORMONE: CPT

## 2022-09-12 PROCEDURE — 36415 COLL VENOUS BLD VENIPUNCTURE: CPT

## 2022-09-12 PROCEDURE — 80053 COMPREHEN METABOLIC PANEL: CPT

## 2022-09-12 PROCEDURE — 80061 LIPID PANEL: CPT

## 2022-09-12 PROCEDURE — 83036 HEMOGLOBIN GLYCOSYLATED A1C: CPT

## 2022-09-12 PROCEDURE — 82306 VITAMIN D 25 HYDROXY: CPT

## 2022-09-12 PROCEDURE — 85025 COMPLETE CBC W/AUTO DIFF WBC: CPT

## 2022-09-12 PROCEDURE — 3052F HG A1C>EQUAL 8.0%<EQUAL 9.0%: CPT | Performed by: NURSE PRACTITIONER

## 2022-09-15 ENCOUNTER — OFFICE VISIT (OUTPATIENT)
Dept: FAMILY MEDICINE CLINIC | Facility: CLINIC | Age: 47
End: 2022-09-15
Payer: COMMERCIAL

## 2022-09-15 VITALS
DIASTOLIC BLOOD PRESSURE: 76 MMHG | HEART RATE: 64 BPM | HEIGHT: 71 IN | RESPIRATION RATE: 16 BRPM | OXYGEN SATURATION: 98 % | BODY MASS INDEX: 44.1 KG/M2 | WEIGHT: 315 LBS | SYSTOLIC BLOOD PRESSURE: 116 MMHG

## 2022-09-15 DIAGNOSIS — E11.65 TYPE 2 DIABETES MELLITUS WITH HYPERGLYCEMIA, WITHOUT LONG-TERM CURRENT USE OF INSULIN (HCC): Primary | ICD-10-CM

## 2022-09-15 DIAGNOSIS — E55.9 VITAMIN D DEFICIENCY: ICD-10-CM

## 2022-09-15 DIAGNOSIS — E66.01 MORBID OBESITY (HCC): ICD-10-CM

## 2022-09-15 DIAGNOSIS — L40.9 PSORIASIS: ICD-10-CM

## 2022-09-15 DIAGNOSIS — E78.2 MIXED HYPERLIPIDEMIA: ICD-10-CM

## 2022-09-15 DIAGNOSIS — E87.1 HYPONATREMIA: ICD-10-CM

## 2022-09-15 DIAGNOSIS — I10 ESSENTIAL HYPERTENSION: ICD-10-CM

## 2022-09-15 PROBLEM — N48.30 PAINFUL PENILE ERECTION: Status: RESOLVED | Noted: 2020-01-28 | Resolved: 2022-09-15

## 2022-09-15 PROBLEM — R35.0 URINARY FREQUENCY: Status: RESOLVED | Noted: 2020-01-28 | Resolved: 2022-09-15

## 2022-09-15 PROCEDURE — 99214 OFFICE O/P EST MOD 30 MIN: CPT | Performed by: NURSE PRACTITIONER

## 2022-09-15 PROCEDURE — 4010F ACE/ARB THERAPY RXD/TAKEN: CPT | Performed by: NURSE PRACTITIONER

## 2022-09-15 PROCEDURE — 3074F SYST BP LT 130 MM HG: CPT | Performed by: NURSE PRACTITIONER

## 2022-09-15 PROCEDURE — 3078F DIAST BP <80 MM HG: CPT | Performed by: NURSE PRACTITIONER

## 2022-09-15 RX ORDER — ROSUVASTATIN CALCIUM 5 MG/1
5 TABLET, COATED ORAL DAILY
Qty: 90 TABLET | Refills: 1 | Status: SHIPPED | OUTPATIENT
Start: 2022-09-15

## 2022-09-15 RX ORDER — LISINOPRIL 10 MG/1
10 TABLET ORAL DAILY
Qty: 90 TABLET | Refills: 1 | Status: SHIPPED | OUTPATIENT
Start: 2022-09-15

## 2022-09-15 RX ORDER — LISINOPRIL 20 MG/1
20 TABLET ORAL DAILY
Qty: 90 TABLET | Refills: 1 | Status: SHIPPED | OUTPATIENT
Start: 2022-09-15

## 2022-09-15 NOTE — PROGRESS NOTES
Name: Kathryn Atkinson      : 1975      MRN: [id number]  Encounter Provider: LARS Verdugo  Encounter Date: 9/15/2022   Encounter department: [facility] [address]     1  Type 2 diabetes mellitus with hyperglycemia, without long-term current use of insulin ([facility] )  -     Ambulatory referral to Diabetic Education; Future  -     metFORMIN (GLUCOPHAGE) 1000 MG tablet; Take 1 tablet (1,000 mg total) by mouth 2 (two) times a day with meals    Patient's hemoglobin A1c went form 7 1 to 8 6  Patient reports that he has not been eating a healthy diabetic diet  Diabetic diet reviewed  Continue metformin 1,000mg BID  Patient instructed to exercise on a regular basis  Patient reports that he wants to work on his diet before adding another diabetic medication  Fasting blood sugar 212  Repeat cmp in 1 month  Repeat hemoglobin A1c in 3 months  Patient referred for diabetes education  2  Essential hypertension  -     lisinopril (ZESTRIL) 20 mg tablet; Take 1 tablet (20 mg total) by mouth daily  -     lisinopril (ZESTRIL) 10 mg tablet; Take 1 tablet (10 mg total) by mouth daily    BP stable  Continue lisinopril 30mg daily  3  Morbid obesity ([facility] )  Continue to follow-up with weight management  4  Mixed hyperlipidemia  -     rosuvastatin (CRESTOR) 5 mg tablet; Take 1 tablet (5 mg total) by mouth daily    Total cholesterol 167, LDL 85, Triglycerides 227  Patient reports that he ran out of his crestor 1 month ago  Continue crestor 5mg daily  Patient instructed to eat a diabetic diet  Repeat lipid panel in 3 months  5  Vitamin D deficiency  Vitamin D level 28 7  Patient instructed to take vitamin D 2,000 units daily OTC  6  Psoriasis  Continue to follow-up with dermatology  Reviewed lab results with the patient  Patient instructed to follow-up in 3 months or sooner prn  BMI Counseling: Body mass index is 46 17 kg/m²   The BMI is above normal  Nutrition recommendations include encouraging healthy choices of fruits and vegetables, decreasing fast food intake, consuming healthier snacks, reducing intake of saturated and trans fat and reducing intake of cholesterol  Exercise recommendations include exercising 3-5 times per week  Rationale for BMI follow-up plan is due to patient being overweight or obese  Subjective      Patient is here for a follow-up for chronic medical conditions  Patient is here for a follow-up for DM 2  Patient reports that he has been taking the metformin 1,000mg BID  Patient reports that he has not been eating a good diet for the past month  Denies any nausea or vomiting  Patient reports occasional Has after high carb meals  Patient is here for a follow-up for HTN  Patient takes lisinopril 30mg daily  Denies any chest pain, SOB, or palpitations  Patient follows up with weight management for obesity  Patient follows up with dermatology for psoriasis  Patient is here for a follow-up for hyperlipidemia  Patient ran out of crestor 1 month ago  Patient reports that he has not been watching his diet  Review of Systems   Constitutional: Negative for chills and fever  HENT: Negative for congestion, ear pain, sinus pressure and sore throat  Respiratory: Negative for cough, chest tightness, shortness of breath and wheezing  Cardiovascular: Negative for chest pain, palpitations and leg swelling  Gastrointestinal: Negative for abdominal pain, blood in stool, diarrhea, nausea and vomiting  Genitourinary: Negative for dysuria and hematuria  Skin: Negative for rash  Neurological: Positive for headaches  Negative for dizziness, seizures and syncope  Psychiatric/Behavioral: Negative for confusion  The patient is not nervous/anxious          Current Outpatient Medications on File Prior to Visit   Medication Sig    Multiple Vitamin (Multivitamin Adult) TABS Take 1 tablet by mouth daily    TREMFYA subcutaneous injection Inject 100 mg under the skin every 56 days     [DISCONTINUED] lisinopril (ZESTRIL) 10 mg tablet Take 1 tablet (10 mg total) by mouth daily    [DISCONTINUED] lisinopril (ZESTRIL) 20 mg tablet Take 1 tablet (20 mg total) by mouth daily    [DISCONTINUED] metFORMIN (GLUCOPHAGE) 1000 MG tablet Take 1 tablet (1,000 mg total) by mouth 2 (two) times a day with meals    Cholecalciferol (HM Vitamin D3) 100 MCG (4000 UT) CAPS Take by mouth    [DISCONTINUED] Diclofenac Sodium (VOLTAREN) 1 % Apply 2 g topically 4 (four) times a day    [DISCONTINUED] rosuvastatin (CRESTOR) 5 mg tablet Take 1 tablet (5 mg total) by mouth daily       Objective     /76 (BP Location: Left arm, Patient Position: Sitting, Cuff Size: Large)   Pulse 64   Resp 16   Ht 5' 11" (1 803 m)   Wt (!) 150 kg (331 lb)   SpO2 98%   BMI 46 17 kg/m²     Physical Exam  Vitals reviewed  Constitutional:       General: He is not in acute distress  Appearance: He is obese  He is not ill-appearing or diaphoretic  HENT:      Right Ear: External ear normal       Left Ear: External ear normal       Nose: Nose normal       Mouth/Throat:      Mouth: Mucous membranes are moist       Pharynx: Oropharynx is clear  No posterior oropharyngeal erythema  Eyes:      Conjunctiva/sclera: Conjunctivae normal       Pupils: Pupils are equal, round, and reactive to light  Cardiovascular:      Rate and Rhythm: Normal rate and regular rhythm  Pulses: Normal pulses  Heart sounds: Normal heart sounds  Comments: No edema noted  Pulmonary:      Effort: Pulmonary effort is normal  No respiratory distress  Breath sounds: Normal breath sounds  No wheezing  Musculoskeletal:      Comments: Gait wnl  Skin:     Findings: No rash  Neurological:      Mental Status: He is alert and oriented to person, place, and time     Psychiatric:         Mood and Affect: Mood normal        LARS Desouza

## 2022-09-30 ENCOUNTER — OFFICE VISIT (OUTPATIENT)
Dept: OBGYN CLINIC | Facility: MEDICAL CENTER | Age: 47
End: 2022-09-30
Payer: COMMERCIAL

## 2022-09-30 VITALS
DIASTOLIC BLOOD PRESSURE: 78 MMHG | HEIGHT: 71 IN | HEART RATE: 80 BPM | WEIGHT: 315 LBS | SYSTOLIC BLOOD PRESSURE: 133 MMHG | BODY MASS INDEX: 44.1 KG/M2

## 2022-09-30 DIAGNOSIS — M25.561 PAIN IN BOTH KNEES, UNSPECIFIED CHRONICITY: ICD-10-CM

## 2022-09-30 DIAGNOSIS — M25.562 PAIN IN BOTH KNEES, UNSPECIFIED CHRONICITY: ICD-10-CM

## 2022-09-30 DIAGNOSIS — M17.0 PRIMARY OSTEOARTHRITIS OF BOTH KNEES: Primary | ICD-10-CM

## 2022-09-30 PROCEDURE — 99203 OFFICE O/P NEW LOW 30 MIN: CPT | Performed by: ORTHOPAEDIC SURGERY

## 2022-09-30 RX ORDER — NABUMETONE 750 MG/1
750 TABLET, FILM COATED ORAL 2 TIMES DAILY
Qty: 60 TABLET | Refills: 0 | Status: SHIPPED | OUTPATIENT
Start: 2022-09-30 | End: 2022-10-30

## 2022-09-30 NOTE — PROGRESS NOTES
Assessment:   Diagnosis ICD-10-CM Associated Orders   1  Primary osteoarthritis of both knees  M17 0 nabumetone (RELAFEN) 750 mg tablet   2  Pain in both knees, unspecified chronicity  M25 561 Ambulatory Referral to Orthopedic Surgery    M25 562        Plan:  Patient was counseled on importance of safe weight loss and control of his diabetes for overall health reasons as well as for relief of pain symptoms in bilateral knees  Patient is also advised that elective knee replacement would not be able to be performed with his current hemoglobin A1c level of 8 6  Patient stated that he would continue to work on weight loss and diabetes management  Patient also was counseled on effect of cortisone injections received Toradol injections, as well as viscosupplementation  At this time, patient deferred injections  Patient was prescribed nabumetome and will follow-up as needed if symptoms persist or do not get any better  To do next visit:  Return if symptoms worsen or fail to improve  The above stated was discussed in layman's terms and the patient expressed understanding  All questions were answered to the patient's satisfaction  Subjective:   Emmett Gregory is a 48 y o  male who presents for longstanding bilateral knee symptoms  Patient states that his knees have been bothering him for many years, although over the past 4 months, he states that he has been having significantly worsening knee pain  Patient his diabetic, with last A1c of 8 6  Patient also has a BMI of 46  Patient states that he has tried over-the-counter medications for pain relief, which have provided him with some pain relief  However, patient has not undergone any form of a physical therapy, any injections, or any other form of non operative treatment  Patient states that he is unable to walk significant distances without having pain in his knees prevented him from doing the can not of activities he enjoys    Patient is here for bilateral knee pain, and would like to be worked up further        Review of systems negative unless otherwise specified in HPI    Past Medical History:   Diagnosis Date    Diabetes ([facility] )     Hypertension     Morbid obesity with BMI of 45 0-49 9, [facility]        Past Surgical History:   Procedure Laterality Date    WISDOM TOOTH EXTRACTION         Family History   Problem Relation Age of Onset    No Known Problems Father     Hypertension Maternal Grandfather     Diabetes Maternal Grandfather     Heart disease Maternal Grandfather     Cancer Paternal Aunt     Diabetes Mother     Dementia Mother     Obesity Mother     Stroke Neg Hx        Social History     Occupational History    Not on file   Tobacco Use    Smoking status: Former Smoker    Smokeless tobacco: Never Used    Tobacco comment: Never smoker per [employer]   Vaping Use    Vaping Use: Never used   Substance and Sexual Activity    Alcohol use: No    Drug use: No    Sexual activity: Not on file         Current Outpatient Medications:     lisinopril (ZESTRIL) 10 mg tablet, Take 1 tablet (10 mg total) by mouth daily, Disp: 90 tablet, Rfl: 1    lisinopril (ZESTRIL) 20 mg tablet, Take 1 tablet (20 mg total) by mouth daily, Disp: 90 tablet, Rfl: 1    metFORMIN (GLUCOPHAGE) 1000 MG tablet, Take 1 tablet (1,000 mg total) by mouth 2 (two) times a day with meals, Disp: 180 tablet, Rfl: 1    Multiple Vitamin (Multivitamin Adult) TABS, Take 1 tablet by mouth daily, Disp: , Rfl:     nabumetone (RELAFEN) 750 mg tablet, Take 1 tablet (750 mg total) by mouth 2 (two) times a day, Disp: 60 tablet, Rfl: 0    rosuvastatin (CRESTOR) 5 mg tablet, Take 1 tablet (5 mg total) by mouth daily, Disp: 90 tablet, Rfl: 1    TREMFYA subcutaneous injection, Inject 100 mg under the skin every 56 days , Disp: , Rfl:     Cholecalciferol (HM Vitamin D3) 100 MCG (4000 UT) CAPS, Take by mouth, Disp: , Rfl:     No Known Allergies         Vitals:    09/30/22 1311   BP: 133/78   Pulse: 80       Objective:  Right Knee Exam     Muscle Strength   The patient has normal right knee strength  Tenderness   The patient is experiencing tenderness in the medial joint line, patella and lateral joint line  Range of Motion   The patient has normal right knee ROM  Tests   Varus: negative Valgus: negative  Drawer:  Anterior - negative    Posterior - negative    Other   Erythema: absent  Scars: absent  Sensation: normal  Pulse: present  Swelling: none  Effusion: no effusion present      Left Knee Exam     Muscle Strength   The patient has normal left knee strength  Tenderness   The patient is experiencing tenderness in the medial joint line, patellar tendon and lateral retinaculum  Range of Motion   The patient has normal left knee ROM  Tests   Varus: negative Valgus: negative  Drawer:  Anterior - negative     Posterior - negative    Other   Erythema: absent  Scars: absent  Sensation: normal  Pulse: present  Swelling: none  Effusion: no effusion present              Diagnostics, reviewed and taken today if performed as documented: The attending physician has personally reviewed the pertinent films in PACS and interpretation is as follows:  X-ray of bilateral knees demonstrate tricompartmental degenerative changes, mild in nature  No fracture or dislocation evident  Procedures, if performed today:  None performed      Portions of the record may have been created with voice recognition software  Occasional wrong word or "sound a like" substitutions may have occurred due to the inherent limitations of voice recognition software  Read the chart carefully and recognize, using context, where substitutions have occurred

## 2022-10-11 ENCOUNTER — TELEMEDICINE (OUTPATIENT)
Dept: DIABETES SERVICES | Facility: CLINIC | Age: 47
End: 2022-10-11
Payer: COMMERCIAL

## 2022-10-11 DIAGNOSIS — E11.65 TYPE 2 DIABETES MELLITUS WITH HYPERGLYCEMIA, WITHOUT LONG-TERM CURRENT USE OF INSULIN (HCC): Primary | ICD-10-CM

## 2022-10-11 PROBLEM — Z12.11 SCREENING FOR COLON CANCER: Status: RESOLVED | Noted: 2022-05-11 | Resolved: 2022-10-11

## 2022-10-11 PROCEDURE — 97802 MEDICAL NUTRITION INDIV IN: CPT

## 2022-10-11 NOTE — PATIENT INSTRUCTIONS
Eat 3 meals per day, 4-5 hours apart  No meal skipping       Eat 60-75 grams of carbohydrate per meal and 15 grams per snack     Initiate regular exercise to build to at least 30 minutes per day or 150 minutes of moderate exercise per week, pending physician approval      Complete 3-day food log and return completed log at the follow up appointment

## 2022-10-11 NOTE — PROGRESS NOTES
Medical Nutrition Therapy        Assessment    Visit Type: Initial visit  Chief complaint/Medical Diagnosis/reason for visit E11 65 (ICD-10-CM) - Type 2 diabetes mellitus with hyperglycemia, without long-term current use of insulin ([facility] [address] )    YOUSUF Olga Bruce was seen virtually for the initial MNT appointment  Olga Bruce reported no BG level checks currently, as he was not told that he needs to do this  Olga Bruce tries to exercise but it is limited due to his arthritis pain  He is willing to start lifting weights at home  Weight loss is a goal expressed by patient  Olga Bruce reported being motivated to make changes for a healthier lifestyle  He currently tries to eat a high protein, low carb diet with high fat foods like pork damon burgers [employer]  (Some of the food choices appear to be in the keto diet, although patient is not trying to follow keto )  Problems identified in food recall include meal skipping, inconsistent carbohydrate intake  Consumption of carbohydrates ranges from 11 to 38 grams per meal  Provided patient with a 2111 calorie meal plan to assist with consistency, balance, portion control and weight loss  Encouraged the consumption of regular meals at regular times  Advised patient to keep carbohydrate intake to 60-75 grams per meal and 15 per snack to assist with glycemic control  Suggested keeping protein intake to 10 ounces a day and fat to 5 servings daily to assist with lipid management and calorie control  Portion booklet and food labels were used to teach basic carbohydrate counting  Patient agreed to keep daily food logs and return them in 2 months for assessment  RD will remain available for further dietary questions/concerns       Ht Readings from Last 1 Encounters:   09/30/22 5' 11" (1 803 m)     Wt Readings from Last 3 Encounters:   09/30/22 (!) 150 kg (330 lb)   09/15/22 (!) 150 kg (331 lb)   06/22/22 (!) 148 kg (326 lb)     Weight Change: No    Barriers to Learning: no barriers    Do you follow any special diet presently?: Yes - low carb, high protein (50 net carbs/day)  Who shops: patient and spouse  Who cooks: patient    Food Log: Completed via the method of food recall    Wakes up 6-6:30 am    Breakfast: 8-10 am; 3 eggs with 1/2 tsp butter for cooking, 2 slices american cheese, damon bits, coffee with 1/4 c half and half (no carb) (typically keto bread; meat) OR skips OR eggs and fried onions  Morning Snack: 10:30-11:30 am; sometimes, sometimes but rarely has 2 plain low carb tortilla wrap (19 carb), but usually 1 or cheese or lunch meat OR 6 net carbs worth carrots & homemade blue cheese dressing  Lunch:12-1pm; sandwich (keto bread (11 g/ slice) with 2+ oz lunch meat, lettuce, tomato, montes) with SF lemon drink for water OR chicken sausage (1 g carb in the meat) with cucumbers or alone OR 2 [place] ([facility]) burger (feta, spinach) without bread but sometimes on there or lettuce wrap  Afternoon Snack: 3-4pm; not always, but sometimes a small apple if not feeling well (tired)   Sometimes a few regular tortilla chips  Dinner:6-7 pm; cube steaks with sauteed onions (dusted in almond flour) & no veg OR baked cod fish (dusted in almond flour) with a little olive oil with brussel sprouts and damon bits with 1/2 tsp of brown sugar with balsamic vinegar with 1 cup acorn squash   Evening Snack:usually a protein or cream cheese or hot tea or rotissorie chicken with mustard  Beverages: water, carbonated water, SF drink, coffee, hot tea, diet coke  Eating out/Take out: 3-4x/ week; mushoo chicken OR cheese & from pizza on a plate or slice of keto bread OR cheese burger in lettuce wrap with sparkling water  Exercise none    Calorie needs 2111 kcals/day Carbs: 60-75 g/meal, 15 g/snack     Fat: 5 servings/day    Protein:10 ounces/day    Nutrition Diagnosis:  Food and nutrition related knowledge deficit  related to Lack of prior exposure to accurate nutrition related information as evidenced by Verbalizes inaccurate or incomplete information    Intervention: plate method, increased fiber intake, label reading, carbohydrate counting, meal timing, individualized meal plan, exercise guidelines and food diary     Treatment Goals: Patient understands education and recommendations, Patient will monitor food intake daily with tracker, Patient will consume 3 meals a day, Patient will count carbohydrates and Patient will exercise    Monitoring and evaluation:    Term code indicator  FH 1 3 2 Food Intake Criteria: Eat 3 meals per day, 4-5 hours apart  No meal skipping  Term code indicator  FH 1 6 3 Carbohydrate Intake Criteria: Eat 60-75 grams of carbohydrate per meal and 15 grams per snack   Term code indicator  CH 2 2 Treatments/Therapy/Alternative Medicine Criteria: Initiate regular exercise to build to at least 30 minutes per day or 150 minutes of moderate exercise per week, pending physician approval      Materials Provided: Portion book, 3-day food log (via postal mail)    Patient’s Response to Instruction:  Comprehensiongood  Motivationgood  Expected Compliancegood    Begin Time: 3:45 pm  End Time: 4:48 pm  Referring Provider: LARS Bran    Thank you for coming to the [address] [place] [address] for education today  Please feel free to call with any questions or concerns      Aundrea Petit MS, RDN, LDN  [address] [place] [id number] [phone]  Virtual Regular Visit    Verification of patient location:    Patient is located in the following state in which I hold an active license [place]      Assessment/Plan:    Problem List Items Addressed This Visit        Endocrine    Type 2 diabetes mellitus with hyperglycemia, without long-term current use of insulin ([facility] [address] )               Reason for visit is   Chief Complaint   Patient presents with   • Virtual Regular Visit        Encounter provider Aundrea Petit RD    Provider located at [address] [facility]  [address] [place] [id number] [phone]      Recent Visits  No visits were found meeting these conditions  Showing recent visits within past 7 days and meeting all other requirements  Future Appointments  No visits were found meeting these conditions  Showing future appointments within next 150 days and meeting all other requirements       The patient was identified by name and date of birth  Gi Horner was informed that this is a telemedicine visit and that the visit is being conducted through [facility] and patient was informed this is a secure, HIPAA-complaint platform  He agrees to proceed     My office door was closed  No one else was in the room  He acknowledged consent and understanding of privacy and security of the video platform  The patient has agreed to participate and understands they can discontinue the visit at any time  Patient is aware this is a billable service  Subjective  Gi Horner is a 50 y o  male with type 2 diabetes with hyperglycemia         HPI     Past Medical History:   Diagnosis Date   • Diabetes ([facility] [address] )    • Hypertension    • Morbid obesity with BMI of 45 0-49 9, adult [facility])        Past Surgical History:   Procedure Laterality Date   • WISDOM TOOTH EXTRACTION         Current Outpatient Medications   Medication Sig Dispense Refill   • Cholecalciferol (HM Vitamin D3) 100 MCG (4000 UT) CAPS Take by mouth     • lisinopril (ZESTRIL) 10 mg tablet Take 1 tablet (10 mg total) by mouth daily 90 tablet 1   • lisinopril (ZESTRIL) 20 mg tablet Take 1 tablet (20 mg total) by mouth daily 90 tablet 1   • metFORMIN (GLUCOPHAGE) 1000 MG tablet Take 1 tablet (1,000 mg total) by mouth 2 (two) times a day with meals 180 tablet 1   • Multiple Vitamin (Multivitamin Adult) TABS Take 1 tablet by mouth daily     • nabumetone (RELAFEN) 750 mg tablet Take 1 tablet (750 mg total) by mouth 2 (two) times a day 60 tablet 0   • rosuvastatin (CRESTOR) 5 mg tablet Take 1 tablet (5 mg total) by mouth daily 90 tablet 1   • TREMFYA subcutaneous injection Inject 100 mg under the skin every 56 days        No current facility-administered medications for this visit  No Known Allergies    Review of Systems    Video Exam    There were no vitals filed for this visit      Physical Exam     I spent 63 minutes directly with the patient during this visit

## 2022-12-12 ENCOUNTER — TELEMEDICINE (OUTPATIENT)
Dept: DIABETES SERVICES | Facility: CLINIC | Age: 47
End: 2022-12-12

## 2022-12-12 VITALS — WEIGHT: 315 LBS | BODY MASS INDEX: 45.33 KG/M2

## 2022-12-12 DIAGNOSIS — E11.65 TYPE 2 DIABETES MELLITUS WITH HYPERGLYCEMIA, WITHOUT LONG-TERM CURRENT USE OF INSULIN (HCC): Primary | ICD-10-CM

## 2022-12-12 NOTE — PATIENT INSTRUCTIONS
Review American Diabetes Association and American Heart Association websites for recipe ideas and meal planning resources  Follow-up with MNT or DSME as needed

## 2022-12-12 NOTE — PROGRESS NOTES
Virtual Regular Visit    Verification of patient location:    Patient is located in the following state in which I hold an active license [place]      Assessment/Plan:    Problem List Items Addressed This Visit    None           Reason for visit is   Chief Complaint   Patient presents with   • Virtual Regular Visit        Encounter provider Jovani Beckford RD    Provider located at [address] [place] [address] Nicolás Nettles [id number] [phone]      Recent Visits  No visits were found meeting these conditions  Showing recent visits within past 7 days and meeting all other requirements  Future Appointments  No visits were found meeting these conditions  Showing future appointments within next 150 days and meeting all other requirements       The patient was identified by name and date of birth  Alvin Evangelista was informed that this is a telemedicine visit and that the visit is being conducted through the [employer]  He agrees to proceed     My office door was closed  No one else was in the room  He acknowledged consent and understanding of privacy and security of the video platform  The patient has agreed to participate and understands they can discontinue the visit at any time  Patient is aware this is a billable service  Subjective  Alvin Evangelista is a 50 y o  male type 2 diabetes         HPI     Past Medical History:   Diagnosis Date   • Diabetes ([facility] [address] )    • Hypertension    • Morbid obesity with BMI of 45 0-49 9, adult [facility])        Past Surgical History:   Procedure Laterality Date   • WISDOM TOOTH EXTRACTION         Current Outpatient Medications   Medication Sig Dispense Refill   • Cholecalciferol (HM Vitamin D3) 100 MCG (4000 UT) CAPS Take by mouth     • lisinopril (ZESTRIL) 10 mg tablet Take 1 tablet (10 mg total) by mouth daily 90 tablet 1   • lisinopril (ZESTRIL) 20 mg tablet Take 1 tablet (20 mg total) by mouth daily 90 tablet 1   • metFORMIN (GLUCOPHAGE) 1000 MG tablet Take 1 tablet (1,000 mg total) by mouth 2 (two) times a day with meals 180 tablet 1   • Multiple Vitamin (Multivitamin Adult) TABS Take 1 tablet by mouth daily     • nabumetone (RELAFEN) 750 mg tablet Take 1 tablet (750 mg total) by mouth 2 (two) times a day 60 tablet 0   • rosuvastatin (CRESTOR) 5 mg tablet Take 1 tablet (5 mg total) by mouth daily 90 tablet 1   • TREMFYA subcutaneous injection Inject 100 mg under the skin every 56 days        No current facility-administered medications for this visit  No Known Allergies    Review of Systems    Video Exam    There were no vitals filed for this visit  Physical Exam     I spent 70 minutes directly with the patient during this visit    Medical Nutrition Therapy        Assessment    Visit Type: Follow-up visit  Chief complaint/Medical Diagnosis/reason for visit E11 65 (ICD-10-CM) - Type 2 diabetes mellitus with hyperglycemia, without long-term current use of insulin ([facility] [address] )    HPI Aleida Augustine was seen virtually for the follow-up MNT appointment  BG levels are not checked due to his last A1c of 5 8% not requiring this monitoring  Aleida Augustine reported no consistent exercise being followed, but has performed some walks  Aleida Augustine reported a busy work schedule and difficulty with fitting in meal preparation  The lack of ideas for carbohydrate choices throughout the day was identified as a problem  Suggested the ADA, AHA, and other websites for recipe ideas  A written list of carbohydrates was recommended to use when grocery shopping  Food preparation on weekends for reheating during the workweek was suggested to have carbs on hand and avoid dinners without carbs  Quick "mug meals" were suggested for a quick, balanced meal choice  Examples of mug meals provided  Aleida Augustine reported that he feels he "knows what to do" to manage his eating, "it is just a matter of doing it"   Problems identified in food recall include inconsistent carbohyrate intake  Consumption of carbohydrates ranges from 0 to ~50 grams per meal  Patient reports not needing a follow-up appointment at this time  RD will remain available for further dietary questions/concerns       Ht Readings from Last 1 Encounters:   09/30/22 5' 11" (1 803 m)     Wt Readings from Last 3 Encounters:   12/12/22 (!) 147 kg (325 lb)   09/30/22 (!) 150 kg (330 lb)   09/15/22 (!) 150 kg (331 lb)     Weight Change: Yes 5 lbs intentional weight loss    Barriers to Learning: no barriers    Do you follow any special diet presently?: No  Who shops: patient and spouse  Who cooks: patient and spouse    Food Log: Completed via the method of food recall    Wakes 6:30-7 am    Breakfast:8-9 am; 1 c cinnamon cheerios with 1/2 c skim milk and fruit (1 cup strawberries & 1/2 c blueberries) and sometimes an ounce of almonds with coffee with half & half or non-dairy creamer & skim OR 1 slice multigrain toast and 1/2 avocado and 2 oz smoked salmon with coffee OR 2 whole eggs and 1 egg white with 2 oz turkey sausage with low carb wrap (19 g cho) or fruit (1 c strawberries)  Morning Snack: 10:30-11 am; dark chocolate granola bar (15 g) or 1 cup of FF yogurt w/ 1/2 c strawberry and 1 oz almonds or 1-2 oz cheese and 1- 1 1/2 oz pepperoni and low-carb wrap  Lunch:11:30-12 pm; stuffed chicken breast (breaded, cheese & broccoli; 14 g) with 1 c cucumber and 1 c snap peas OR left over chicken with 1 c cauliflower with low carb wrap (19 g) and montes with water & lemon juice or diet coke  Afternoon Snack: 2:30 pm; dark chocolate granola bar (15 g) or 1 cup of FF yogurt w/ 1/2 c strawberry and 1 oz almonds OR cheese & yogurt  Dinner:6 pm; turkey sandwich on multigrain bread with 1/8 cup coleslaw and steamed broccoli and [place]yr[place]z onion soup without the bread OR italian marinated chicken (2g) with snap peas and 1/2 c white rice with small amount of butter OR shredded chicken with seasoning on wheat bun with 1/2 c sliced dill pickles with 1 c shredded red cabbage   Evening Snack: 3 c popcorn or dark chocolate granola bar (15 g) or 1 cup of FF yogurt w/ 1/2 c strawberry and 1 oz almonds OR cheese & yogurt OR 2 cubes of cheese  Beverages: water with lemon, diet soda, coffee, low carb beer  Eating out/Take out: 2 to 2 1/2 pizza but doesn't eat all the dough and with a salad OR chinese - wonton soup (2 of the won tons) with moo shoo chicken with cabbage  Exercise not much, a couple walks (1x/week at best)        Nutrition Diagnosis:  Not ready for diet/lifestyle change  related to Perception that time, interpersonal, or financial constraints prevent change as evidenced by Lack of efficacy to make change or to overcome barriers to change    Intervention: label reading, behavior modification strategies and carbohydrate counting     Treatment Goals: Patient understands education and recommendations, Patient will consume 3 meals a day and Patient will count carbohydrates    Monitoring and evaluation:    Term code indicator  FH 3 1 Food and Nutrition Knowledge Criteria: Review American Diabetes Association and American Heart Association websites for recipe ideas and meal planning resources  Term code indicator  FH 3 1 Food and Nutrition Knowledge Criteria: Follow-up with MNT or DSME as needed  Materials Provided: ADA, AHA website for meal planning/recipes    Patient’s Response to Instruction:  Comprehensiongood  Motivationgood  Expected Compliancegood    Begin Time: 3:06 pm  End Time: 4:16 pm  Referring Provider: Verdell Gilford    Thank you for coming to the [facility] [address] [place] [address] for education today  Please feel free to call with any questions or concerns      Daisy Hill, RD  [address] [place] [id number] [phone]

## 2023-01-20 ENCOUNTER — APPOINTMENT (OUTPATIENT)
Dept: LAB | Facility: MEDICAL CENTER | Age: 48
End: 2023-01-20

## 2023-01-20 DIAGNOSIS — E78.2 MIXED HYPERLIPIDEMIA: ICD-10-CM

## 2023-01-20 DIAGNOSIS — E66.01 MORBID OBESITY (HCC): ICD-10-CM

## 2023-01-20 DIAGNOSIS — E11.65 TYPE 2 DIABETES MELLITUS WITH HYPERGLYCEMIA, WITHOUT LONG-TERM CURRENT USE OF INSULIN (HCC): ICD-10-CM

## 2023-01-20 DIAGNOSIS — E87.1 HYPONATREMIA: ICD-10-CM

## 2023-01-20 LAB
ALBUMIN SERPL BCP-MCNC: 4.1 G/DL (ref 3.5–5)
ALP SERPL-CCNC: 90 U/L (ref 46–116)
ALT SERPL W P-5'-P-CCNC: 85 U/L (ref 12–78)
ANION GAP SERPL CALCULATED.3IONS-SCNC: 8 MMOL/L (ref 4–13)
AST SERPL W P-5'-P-CCNC: 39 U/L (ref 5–45)
BILIRUB SERPL-MCNC: 0.81 MG/DL (ref 0.2–1)
BUN SERPL-MCNC: 14 MG/DL (ref 5–25)
CALCIUM SERPL-MCNC: 10 MG/DL (ref 8.3–10.1)
CHLORIDE SERPL-SCNC: 100 MMOL/L (ref 96–108)
CHOLEST SERPL-MCNC: 148 MG/DL
CO2 SERPL-SCNC: 26 MMOL/L (ref 21–32)
CREAT SERPL-MCNC: 0.96 MG/DL (ref 0.6–1.3)
EST. AVERAGE GLUCOSE BLD GHB EST-MCNC: 235 MG/DL
GFR SERPL CREATININE-BSD FRML MDRD: 92 ML/MIN/1.73SQ M
GLUCOSE P FAST SERPL-MCNC: 259 MG/DL (ref 65–99)
HBA1C MFR BLD: 9.8 %
HDLC SERPL-MCNC: 41 MG/DL
LDLC SERPL CALC-MCNC: 52 MG/DL (ref 0–100)
POTASSIUM SERPL-SCNC: 4.7 MMOL/L (ref 3.5–5.3)
PROT SERPL-MCNC: 8.2 G/DL (ref 6.4–8.4)
SODIUM SERPL-SCNC: 134 MMOL/L (ref 135–147)
TRIGL SERPL-MCNC: 274 MG/DL

## 2023-01-25 ENCOUNTER — OFFICE VISIT (OUTPATIENT)
Dept: FAMILY MEDICINE CLINIC | Facility: CLINIC | Age: 48
End: 2023-01-25

## 2023-01-25 VITALS
DIASTOLIC BLOOD PRESSURE: 80 MMHG | HEART RATE: 80 BPM | OXYGEN SATURATION: 99 % | WEIGHT: 315 LBS | RESPIRATION RATE: 18 BRPM | BODY MASS INDEX: 44.1 KG/M2 | HEIGHT: 71 IN | SYSTOLIC BLOOD PRESSURE: 130 MMHG

## 2023-01-25 DIAGNOSIS — E66.01 MORBID OBESITY (HCC): ICD-10-CM

## 2023-01-25 DIAGNOSIS — E78.2 MIXED HYPERLIPIDEMIA: ICD-10-CM

## 2023-01-25 DIAGNOSIS — E55.9 VITAMIN D DEFICIENCY: ICD-10-CM

## 2023-01-25 DIAGNOSIS — E11.65 TYPE 2 DIABETES MELLITUS WITH HYPERGLYCEMIA, WITHOUT LONG-TERM CURRENT USE OF INSULIN (HCC): Primary | ICD-10-CM

## 2023-01-25 DIAGNOSIS — I10 ESSENTIAL HYPERTENSION: ICD-10-CM

## 2023-01-25 RX ORDER — CHLORAL HYDRATE 500 MG
2000 CAPSULE ORAL DAILY
Qty: 30 CAPSULE | Refills: 3
Start: 2023-01-25

## 2023-01-25 NOTE — PROGRESS NOTES
Name: Elo Vázquez      : 1975      MRN: [id number]  Encounter Provider: LARS Coppola  Encounter Date: 2023   Encounter department: [facility]     1  Type 2 diabetes mellitus with hyperglycemia, without long-term current use of insulin (HCC)  -     dapagliflozin (Farxiga) 5 MG TABS; Take 1 tablet (5 mg total) by mouth in the morning  -     Comprehensive metabolic panel; Future; Expected date: 2023  -     Comprehensive metabolic panel; Future; Expected date: 2023  -     CBC and differential; Future; Expected date: 2023  -     TSH, 3rd generation with Free T4 reflex; Future; Expected date: 2023  -     Lipid Panel with Direct LDL reflex; Future; Expected date: 2023  -     HEMOGLOBIN A1C W/ EAG ESTIMATION; Future; Expected date: 2023    Hemoglobin A1c 9 8  Patient instructed to eat a healthy low fat/ diabetic diet  Continue metformin 1,000mg BID  Farxiga 5mg daily prescribed  Medication information and side effects reviewed  Repeat CMP in 1 month  Repeat hemoglobin A1c in 3 months  2  Essential hypertension  -     Comprehensive metabolic panel; Future; Expected date: 2023  -     CBC and differential; Future; Expected date: 2023  -     TSH, 3rd generation with Free T4 reflex; Future; Expected date: 2023  -     Lipid Panel with Direct LDL reflex; Future; Expected date: 2023    /80  Continue lisinopril 30mg daily  Recheck BP in 1 month  3  Morbid obesity ([address] )  -     Comprehensive metabolic panel; Future; Expected date: 2023  -     CBC and differential; Future; Expected date: 2023  -     TSH, 3rd generation with Free T4 reflex; Future; Expected date: 2023  -     Lipid Panel with Direct LDL reflex; Future; Expected date: 2023  -     HEMOGLOBIN A1C W/ EAG ESTIMATION; Future; Expected date: 2023    Importance of weight loss discussed  Patient instructed to exercise on a regular basis  Patient instructed to eat a healthy low fat/diabetic diet  4  Mixed hyperlipidemia  -     Omega-3 Fatty Acids (fish oil) 1,000 mg; Take 2 capsules (2,000 mg total) by mouth daily  -     Comprehensive metabolic panel; Future; Expected date: 04/25/2023  -     CBC and differential; Future; Expected date: 04/25/2023  -     TSH, 3rd generation with Free T4 reflex; Future; Expected date: 04/25/2023  -     Lipid Panel with Direct LDL reflex; Future; Expected date: 04/25/2023    Total cholesterol 148, HDL 41, LDL 52, Triglycerides 274  Continue crestor 5mg daily  Patient instructed to take 2,000mg of fish oil OTC daily  Medication information and side effects reviewed  Repeat lipid panel in 3 months  5  Vitamin D deficiency  -     Vitamin D 25 hydroxy; Future; Expected date: 04/25/2023    Patient instructed to take 2,000 units of vitamin D OTC daily  Vitamin D level ordered  Reviewed lab results with the patient  ALT slightly elevated  Repeat CMP in 1 month  Discussion on DM 2 and treatment  Importance of weight loss and following a diabetic diet discussed  Patient instructed to follow-up in 1 month or sooner prn  BMI Counseling: Body mass index is 44 63 kg/m²  The BMI is above normal  Nutrition recommendations include encouraging healthy choices of fruits and vegetables, decreasing fast food intake, consuming healthier snacks, reducing intake of saturated and trans fat and reducing intake of cholesterol  Rationale for BMI follow-up plan is due to patient being overweight or obese  Depression Screening and Follow-up Plan: Patient was screened for depression during today's encounter  They screened negative with a PHQ-2 score of 0  Subjective      Patient is here for a follow-up for chronic medical conditions  Patient is here for a follow-up for type 2 DM   Patient reports that he saw the diabetes   Patient reports that he takes metformin 1,000mg BID  Patient reports that he has been watching his diet  Patient takes lisinopril 30 mg daily for HTN  Denies any chest pain, SOB, palpitations, dizziness, or Has  Patient is here for a follow-up for obesity and hyperlipidemia  Patient takes crestor for hyperlipidemia  Patient reports that he has been watching his diet  Patient reports that he has not been taking a vitamin D supplement for vitamin D deficiency  Review of Systems   Constitutional: Negative for chills and fever  HENT: Negative for congestion, ear pain, sinus pressure and sore throat  Respiratory: Negative for cough, chest tightness, shortness of breath and wheezing  Cardiovascular: Negative for chest pain, palpitations and leg swelling  Gastrointestinal: Negative for abdominal pain, blood in stool, diarrhea, nausea and vomiting  Genitourinary: Negative for dysuria, frequency and hematuria  Skin: Negative for rash  Neurological: Negative for dizziness, seizures, syncope, light-headedness and headaches  Current Outpatient Medications on File Prior to Visit   Medication Sig   • lisinopril (ZESTRIL) 10 mg tablet Take 1 tablet (10 mg total) by mouth daily   • lisinopril (ZESTRIL) 20 mg tablet Take 1 tablet (20 mg total) by mouth daily   • metFORMIN (GLUCOPHAGE) 1000 MG tablet Take 1 tablet (1,000 mg total) by mouth 2 (two) times a day with meals   • Multiple Vitamin (Multivitamin Adult) TABS Take 1 tablet by mouth daily   • rosuvastatin (CRESTOR) 5 mg tablet Take 1 tablet (5 mg total) by mouth daily   • TREMFYA subcutaneous injection Inject 100 mg under the skin every 56 days    • nabumetone (RELAFEN) 750 mg tablet Take 1 tablet (750 mg total) by mouth 2 (two) times a day       Objective     /84   Pulse 80   Resp 18   Ht 5' 11" (1 803 m)   Wt (!) 145 kg (320 lb)   SpO2 99%   BMI 44 63 kg/m²     Physical Exam  Vitals reviewed     Constitutional:       General: He is not in acute distress  Appearance: He is obese  He is not ill-appearing or diaphoretic  HENT:      Right Ear: External ear normal       Left Ear: External ear normal       Nose: Nose normal       Mouth/Throat:      Mouth: Mucous membranes are moist       Pharynx: Oropharynx is clear  No posterior oropharyngeal erythema  Eyes:      Conjunctiva/sclera: Conjunctivae normal       Pupils: Pupils are equal, round, and reactive to light  Cardiovascular:      Rate and Rhythm: Normal rate and regular rhythm  Pulses: Normal pulses  no weak pulses          Dorsalis pedis pulses are 2+ on the right side and 2+ on the left side  Heart sounds: Normal heart sounds  Comments: No edema noted  Pulmonary:      Effort: Pulmonary effort is normal  No respiratory distress  Breath sounds: Normal breath sounds  No wheezing  Musculoskeletal:      Comments: Gait wnl  Feet:      Right foot:      Skin integrity: Dry skin present  No ulcer, skin breakdown, erythema, warmth or callus  Left foot:      Skin integrity: Dry skin present  No ulcer, skin breakdown, erythema, warmth or callus  Skin:     Findings: No rash  Neurological:      Mental Status: He is alert and oriented to person, place, and time  Psychiatric:         Mood and Affect: Mood normal      Patient's shoes and socks removed  Right Foot/Ankle   Right Foot Inspection  Skin Exam: dry skin  Skin not intact, no warmth, no callus, no erythema, no maceration, no abnormal color, no pre-ulcer, no ulcer and no callus  Toe Exam: ROM and strength within normal limits  No swelling, no tenderness, erythema and  no right toe deformity    Sensory   Monofilament testing: intact    Vascular  Capillary refills: < 3 seconds  The right DP pulse is 2+  Left Foot/Ankle  Left Foot Inspection  Skin Exam: dry skin  Skin not intact, no warmth, no erythema, no maceration, normal color, no pre-ulcer, no ulcer and no callus       Toe Exam: ROM and strength within normal limits  No swelling, no tenderness, no erythema and no left toe deformity  Sensory   Monofilament testing: intact    Vascular  Capillary refills: < 3 seconds  The left DP pulse is 2+       Assign Risk Category  No deformity present  No loss of protective sensation  No weak pulses  Risk: 0    LARS Galvez

## 2023-01-25 NOTE — PATIENT INSTRUCTIONS

## 2023-03-06 ENCOUNTER — OFFICE VISIT (OUTPATIENT)
Dept: FAMILY MEDICINE CLINIC | Facility: CLINIC | Age: 48
End: 2023-03-06

## 2023-03-06 VITALS
RESPIRATION RATE: 18 BRPM | HEIGHT: 71 IN | DIASTOLIC BLOOD PRESSURE: 78 MMHG | WEIGHT: 315 LBS | HEART RATE: 93 BPM | SYSTOLIC BLOOD PRESSURE: 128 MMHG | BODY MASS INDEX: 44.1 KG/M2 | OXYGEN SATURATION: 99 %

## 2023-03-06 DIAGNOSIS — I10 ESSENTIAL HYPERTENSION: ICD-10-CM

## 2023-03-06 DIAGNOSIS — E11.65 TYPE 2 DIABETES MELLITUS WITH HYPERGLYCEMIA, WITHOUT LONG-TERM CURRENT USE OF INSULIN (HCC): ICD-10-CM

## 2023-03-06 DIAGNOSIS — M25.512 CHRONIC LEFT SHOULDER PAIN: Primary | ICD-10-CM

## 2023-03-06 DIAGNOSIS — E78.2 MIXED HYPERLIPIDEMIA: ICD-10-CM

## 2023-03-06 DIAGNOSIS — R79.89 ABNORMAL CBC: ICD-10-CM

## 2023-03-06 DIAGNOSIS — E66.01 MORBID OBESITY (HCC): ICD-10-CM

## 2023-03-06 DIAGNOSIS — G89.29 CHRONIC LEFT SHOULDER PAIN: Primary | ICD-10-CM

## 2023-03-06 DIAGNOSIS — M54.9 MID BACK PAIN: ICD-10-CM

## 2023-03-06 RX ORDER — ROSUVASTATIN CALCIUM 5 MG/1
5 TABLET, COATED ORAL DAILY
Qty: 90 TABLET | Refills: 1 | Status: SHIPPED | OUTPATIENT
Start: 2023-03-06

## 2023-03-06 RX ORDER — LISINOPRIL 30 MG/1
30 TABLET ORAL DAILY
Qty: 90 TABLET | Refills: 1 | Status: SHIPPED | OUTPATIENT
Start: 2023-03-06

## 2023-03-06 NOTE — PROGRESS NOTES
Name: Teofilo Paul      : 1975      MRN: [id number]  Encounter Provider: LARS Lizarraga  Encounter Date: 3/6/2023   Encounter department: [facility]     1  Chronic left shoulder pain  -     XR shoulder 2+ vw left; Future; Expected date: 2023  -     Ambulatory Referral to Physical Therapy; Future    Patient reports occasional left shoulder pain for the past 5 years  Denies any injury  Patient reports that the pain is getting worse  Xray ordered  Will follow-up results with the patient  Patient referred to physical therapy for further evaluation and treatment  2  Mid back pain  -     XR spine thoracic 3 vw; Future; Expected date: 2023  -     Ambulatory Referral to Physical Therapy; Future    Patient reports mid back pain for the past 5 years  Denies any injury  Patient reports that the area occasionally feels tight  Xray ordered  Will follow-up results with the patient  Patient referred to physical therapy for further evaluation and treatment  3  Type 2 diabetes mellitus with hyperglycemia, without long-term current use of insulin (HCC)  -     metFORMIN (GLUCOPHAGE) 1000 MG tablet; Take 1 tablet (1,000 mg total) by mouth 2 (two) times a day with meals    Patient reports that he is feeling better overall  Patient reports that he is watching his diet  Patient's fasting blood sugar went down from 259 to 217  Patient is due for hemoglobin a1c at the end of April  Continue metformin 1,000mg BID  Continue farxiga 5mg daily  Patient instructed to eat a healthy low fat/diabetic diet  Patient is going to check with insurance about a glucometer and check his blood sugars both fasting and post prandial a few times a week  Patient instructed to bring his readings to his follow-up visit  4  Essential hypertension  -     lisinopril (ZESTRIL) 30 mg tablet; Take 1 tablet (30 mg total) by mouth daily    /78  Continue lisinopril 30mg daily  5  Morbid obesity ([address] )  Patient instructed to continue to exercise on a regular basis  Patient instructed to eat a healthy low fat/diabetic diet  6  Mixed hyperlipidemia  -     rosuvastatin (CRESTOR) 5 mg tablet; Take 1 tablet (5 mg total) by mouth daily    Lipid panel ordered  Continue crestor 5mg daily  7  Abnormal CBC  -     CBC and differential; Future; Expected date: 03/20/2023  RBC is slightly elevated  Lymphocytes are slightly elevated  Repeat CBC ordered  Will follow-up results with the patient  Reviewed lab results with the patient  Patient instructed to follow-up in 2 months or sooner prn  Subjective      Patient is here for a follow-up for chronic medical conditions  Patient is here for a follow-up for DM 2  Patient takes metformin 1,000mg BID and farxiga 5mg daily  Denies any Has, dizziness, nausea, or vomiting  Patient reports that he is feeling better overall  Patient takes lisinopril 30mg daily for HTN  Denies any chest pain, SOB, or palpitations  Patient is here for a follow-up for obesity  Patient reports that he exercises 5-6 times a week for 45-60 minutes  Patient reports that he is watching his diet more  Patient takes crestor and fish oil for hyperlipidemia  Patient reports occasional left shoulder pain especially with certain movements and laying on his shoulder for the past 5 years  Denies any redness or swelling  Denies any injury  Patient reports that the pain is getting worse  Patient reports that he takes motrin OTC prn  Patient reports occasional mid back pain for the past 5 years  Denies any injury  Patient reports that the area occasionally feels tight  Patient reports that the pain is not going away  Review of Systems   Constitutional: Negative for appetite change, chills and fever  HENT: Negative for congestion, ear pain, sinus pressure, sore throat and trouble swallowing  Respiratory: Negative for cough, chest tightness, shortness of breath and wheezing  Cardiovascular: Negative for chest pain, palpitations and leg swelling  Gastrointestinal: Negative for abdominal pain, blood in stool, diarrhea, nausea and vomiting  Genitourinary: Negative for dysuria and hematuria  Musculoskeletal:        As noted in HPI  Skin: Negative for rash  Neurological: Negative for dizziness, seizures, syncope, light-headedness and headaches  Current Outpatient Medications on File Prior to Visit   Medication Sig   • dapagliflozin (Farxiga) 5 MG TABS Take 1 tablet (5 mg total) by mouth in the morning   • Multiple Vitamin (Multivitamin Adult) TABS Take 1 tablet by mouth daily   • Omega-3 Fatty Acids (fish oil) 1,000 mg Take 2 capsules (2,000 mg total) by mouth daily   • TREMFYA subcutaneous injection Inject 100 mg under the skin every 56 days    • [DISCONTINUED] lisinopril (ZESTRIL) 10 mg tablet Take 1 tablet (10 mg total) by mouth daily   • [DISCONTINUED] lisinopril (ZESTRIL) 20 mg tablet Take 1 tablet (20 mg total) by mouth daily   • [DISCONTINUED] metFORMIN (GLUCOPHAGE) 1000 MG tablet Take 1 tablet (1,000 mg total) by mouth 2 (two) times a day with meals   • [DISCONTINUED] rosuvastatin (CRESTOR) 5 mg tablet Take 1 tablet (5 mg total) by mouth daily   • nabumetone (RELAFEN) 750 mg tablet Take 1 tablet (750 mg total) by mouth 2 (two) times a day       Objective     /78   Pulse 93   Resp 18   Ht 5' 11" (1 803 m)   Wt (!) 145 kg (319 lb 12 8 oz)   SpO2 99%   BMI 44 60 kg/m²     Physical Exam  Vitals reviewed  Constitutional:       General: He is not in acute distress  Appearance: He is obese  He is not ill-appearing or diaphoretic  HENT:      Right Ear: External ear normal       Left Ear: External ear normal       Nose: Nose normal       Mouth/Throat:      Mouth: Mucous membranes are moist       Pharynx: Oropharynx is clear  No posterior oropharyngeal erythema     Eyes: Conjunctiva/sclera: Conjunctivae normal    Cardiovascular:      Rate and Rhythm: Normal rate and regular rhythm  Pulses: Normal pulses  Heart sounds: Normal heart sounds  Comments: No edema noted  Pulmonary:      Effort: Pulmonary effort is normal  No respiratory distress  Breath sounds: Normal breath sounds  No wheezing  Musculoskeletal:      Comments: Gait wnl  No tenderness noted on palpation of the left shoulder region  No redness or swelling noted  Patient reports pain with ROM of the left shoulder  No tenderness noted on palpation of the mid back  No redness or rash noted  Skin:     Findings: No rash  Neurological:      Mental Status: He is alert and oriented to person, place, and time     Psychiatric:         Mood and Affect: Mood normal        LARS Sow

## 2023-05-01 DIAGNOSIS — E11.65 TYPE 2 DIABETES MELLITUS WITH HYPERGLYCEMIA, WITHOUT LONG-TERM CURRENT USE OF INSULIN (HCC): ICD-10-CM

## 2023-05-01 NOTE — TELEPHONE ENCOUNTER
Patient will be out of his [facility] before 5/18 appointment  Requesting short supply to get him to his follow up

## 2023-05-12 ENCOUNTER — HOSPITAL ENCOUNTER (OUTPATIENT)
Dept: RADIOLOGY | Facility: HOSPITAL | Age: 48
Discharge: HOME | End: 2023-05-12

## 2023-05-12 ENCOUNTER — APPOINTMENT (OUTPATIENT)
Dept: LAB | Facility: CLINIC | Age: 48
End: 2023-05-12

## 2023-05-12 ENCOUNTER — TELEPHONE (OUTPATIENT)
Dept: FAMILY MEDICINE CLINIC | Facility: CLINIC | Age: 48
End: 2023-05-12

## 2023-05-12 DIAGNOSIS — M25.512 CHRONIC LEFT SHOULDER PAIN: ICD-10-CM

## 2023-05-12 DIAGNOSIS — M54.9 MID BACK PAIN: ICD-10-CM

## 2023-05-12 DIAGNOSIS — E66.01 MORBID OBESITY (HCC): ICD-10-CM

## 2023-05-12 DIAGNOSIS — E55.9 VITAMIN D DEFICIENCY: ICD-10-CM

## 2023-05-12 DIAGNOSIS — E11.65 TYPE 2 DIABETES MELLITUS WITH HYPERGLYCEMIA, WITHOUT LONG-TERM CURRENT USE OF INSULIN (HCC): ICD-10-CM

## 2023-05-12 DIAGNOSIS — G89.29 CHRONIC LEFT SHOULDER PAIN: ICD-10-CM

## 2023-05-12 DIAGNOSIS — I10 ESSENTIAL HYPERTENSION: ICD-10-CM

## 2023-05-12 DIAGNOSIS — E78.2 MIXED HYPERLIPIDEMIA: ICD-10-CM

## 2023-05-12 DIAGNOSIS — R79.89 ABNORMAL CBC: ICD-10-CM

## 2023-05-12 LAB
25(OH)D3 SERPL-MCNC: 24.6 NG/ML (ref 30–100)
ALBUMIN SERPL BCP-MCNC: 4.6 G/DL (ref 3.5–5)
ALP SERPL-CCNC: 64 U/L (ref 34–104)
ALT SERPL W P-5'-P-CCNC: 39 U/L (ref 7–52)
ANION GAP SERPL CALCULATED.3IONS-SCNC: 5 MMOL/L (ref 4–13)
AST SERPL W P-5'-P-CCNC: 25 U/L (ref 13–39)
BASOPHILS # BLD AUTO: 0.08 THOUSANDS/ÂΜL (ref 0–0.1)
BASOPHILS NFR BLD AUTO: 1 % (ref 0–1)
BILIRUB SERPL-MCNC: 0.6 MG/DL (ref 0.2–1)
BUN SERPL-MCNC: 21 MG/DL (ref 5–25)
CALCIUM SERPL-MCNC: 10.1 MG/DL (ref 8.4–10.2)
CHLORIDE SERPL-SCNC: 101 MMOL/L (ref 96–108)
CHOLEST SERPL-MCNC: 139 MG/DL
CO2 SERPL-SCNC: 27 MMOL/L (ref 21–32)
CREAT SERPL-MCNC: 0.85 MG/DL (ref 0.6–1.3)
EOSINOPHIL # BLD AUTO: 0.23 THOUSAND/ÂΜL (ref 0–0.61)
EOSINOPHIL NFR BLD AUTO: 3 % (ref 0–6)
ERYTHROCYTE [DISTWIDTH] IN BLOOD BY AUTOMATED COUNT: 13.5 % (ref 11.6–15.1)
EST. AVERAGE GLUCOSE BLD GHB EST-MCNC: 180 MG/DL
GFR SERPL CREATININE-BSD FRML MDRD: 102 ML/MIN/1.73SQ M
GLUCOSE P FAST SERPL-MCNC: 170 MG/DL (ref 65–99)
HBA1C MFR BLD: 7.9 %
HCT VFR BLD AUTO: 48.9 % (ref 36.5–49.3)
HDLC SERPL-MCNC: 36 MG/DL
HGB BLD-MCNC: 16.5 G/DL (ref 12–17)
IMM GRANULOCYTES # BLD AUTO: 0.03 THOUSAND/UL (ref 0–0.2)
IMM GRANULOCYTES NFR BLD AUTO: 0 % (ref 0–2)
LDLC SERPL CALC-MCNC: 49 MG/DL (ref 0–100)
LYMPHOCYTES # BLD AUTO: 3.64 THOUSANDS/ÂΜL (ref 0.6–4.47)
LYMPHOCYTES NFR BLD AUTO: 40 % (ref 14–44)
MCH RBC QN AUTO: 28 PG (ref 26.8–34.3)
MCHC RBC AUTO-ENTMCNC: 33.7 G/DL (ref 31.4–37.4)
MCV RBC AUTO: 83 FL (ref 82–98)
MONOCYTES # BLD AUTO: 0.68 THOUSAND/ÂΜL (ref 0.17–1.22)
MONOCYTES NFR BLD AUTO: 8 % (ref 4–12)
NEUTROPHILS # BLD AUTO: 4.46 THOUSANDS/ÂΜL (ref 1.85–7.62)
NEUTS SEG NFR BLD AUTO: 48 % (ref 43–75)
NRBC BLD AUTO-RTO: 0 /100 WBCS
PLATELET # BLD AUTO: 259 THOUSANDS/UL (ref 149–390)
PMV BLD AUTO: 9.2 FL (ref 8.9–12.7)
POTASSIUM SERPL-SCNC: 4.3 MMOL/L (ref 3.5–5.3)
PROT SERPL-MCNC: 7.5 G/DL (ref 6.4–8.4)
RBC # BLD AUTO: 5.89 MILLION/UL (ref 3.88–5.62)
SODIUM SERPL-SCNC: 133 MMOL/L (ref 135–147)
TRIGL SERPL-MCNC: 269 MG/DL
TSH SERPL DL<=0.05 MIU/L-ACNC: 2.94 UIU/ML (ref 0.45–4.5)
WBC # BLD AUTO: 9.12 THOUSAND/UL (ref 4.31–10.16)

## 2023-05-12 NOTE — TELEPHONE ENCOUNTER
Patient called to review recent lab results  He saw that the RBC's are elevated and would like to know if he should be concerned

## 2023-05-16 ENCOUNTER — TELEPHONE (OUTPATIENT)
Dept: FAMILY MEDICINE CLINIC | Facility: CLINIC | Age: 48
End: 2023-05-16

## 2023-05-16 NOTE — TELEPHONE ENCOUNTER
----- Message from Austyn Latif, [address] sent at 5/15/2023  3:04 PM EDT -----  Please let the patient know that his xray just showed degenerative changes of his thoracic spine  Please tell patient to follow-up with physical therapy as discussed

## 2023-05-24 ENCOUNTER — OFFICE VISIT (OUTPATIENT)
Dept: FAMILY MEDICINE CLINIC | Facility: CLINIC | Age: 48
End: 2023-05-24

## 2023-05-24 VITALS
BODY MASS INDEX: 43.96 KG/M2 | DIASTOLIC BLOOD PRESSURE: 80 MMHG | OXYGEN SATURATION: 98 % | SYSTOLIC BLOOD PRESSURE: 118 MMHG | WEIGHT: 314 LBS | RESPIRATION RATE: 16 BRPM | HEIGHT: 71 IN | HEART RATE: 85 BPM

## 2023-05-24 DIAGNOSIS — I10 ESSENTIAL HYPERTENSION: ICD-10-CM

## 2023-05-24 DIAGNOSIS — E78.1 HYPERTRIGLYCERIDEMIA: ICD-10-CM

## 2023-05-24 DIAGNOSIS — E66.01 MORBID OBESITY (HCC): ICD-10-CM

## 2023-05-24 DIAGNOSIS — E55.9 VITAMIN D DEFICIENCY: ICD-10-CM

## 2023-05-24 DIAGNOSIS — E11.65 TYPE 2 DIABETES MELLITUS WITH HYPERGLYCEMIA, WITHOUT LONG-TERM CURRENT USE OF INSULIN (HCC): Primary | ICD-10-CM

## 2023-05-24 DIAGNOSIS — E87.1 HYPONATREMIA: ICD-10-CM

## 2023-05-24 DIAGNOSIS — R79.89 ABNORMAL CBC: ICD-10-CM

## 2023-05-24 RX ORDER — FENOFIBRATE 54 MG/1
54 TABLET ORAL DAILY
Qty: 90 TABLET | Refills: 1 | Status: SHIPPED | OUTPATIENT
Start: 2023-05-24

## 2023-05-24 RX ORDER — FENOFIBRATE 54 MG/1
54 TABLET ORAL DAILY
Qty: 30 TABLET | Refills: 3 | Status: SHIPPED | OUTPATIENT
Start: 2023-05-24 | End: 2023-05-24 | Stop reason: SDUPTHER

## 2023-05-24 NOTE — PROGRESS NOTES
Name: Mario Sams      : 1975      MRN: [id number]  Encounter Provider: LARS Salazar  Encounter Date: 2023   Encounter department: [facility]     1  Type 2 diabetes mellitus with hyperglycemia, without long-term current use of insulin (HCC)  -     dapagliflozin (Farxiga) 10 MG tablet; Take 1 tablet (10 mg total) by mouth in the morning  -     Comprehensive metabolic panel; Future; Expected date: 2023  -     HEMOGLOBIN A1C W/ EAG ESTIMATION; Future; Expected date: 2023  -     Lipid Panel with Direct LDL reflex; Future; Expected date: 2023     Hemoglobin A1c went from 9 8 to 7 9  Continue metformin 1,000mg BID  Increased farxiga to 10mg daily  Patient instructed to eat a healthy low fat/diabetic diet  Repeat hemoglobin A1c in 3 months  2  Essential hypertension  -     Comprehensive metabolic panel; Future; Expected date: 2023  -     Lipid Panel with Direct LDL reflex; Future; Expected date: 2023    BP stable  Continue lisinopril 30mg daily  3  Hypertriglyceridemia  -     fenofibrate (TRICOR) 54 MG tablet; Take 1 tablet (54 mg total) by mouth daily  -     Comprehensive metabolic panel; Future; Expected date: 2023  -     Lipid Panel with Direct LDL reflex; Future; Expected date: 2023    Triglycerides 269  LDL 49  Total cholesterol 139  Continue crestor 5mg daily  Fenofibrate 54mg daily prescribed  Patient instructed to eat a healthy low fat/diabetic diet  Repeat lipid panel in 3 months  4  Morbid obesity ([address] )  -     Comprehensive metabolic panel; Future; Expected date: 2023  -     HEMOGLOBIN A1C W/ EAG ESTIMATION; Future; Expected date: 2023  -     Lipid Panel with Direct LDL reflex; Future; Expected date: 2023    Patient instructed to eat a healthy low fat/diabetic diet  5  Vitamin D deficiency  -     Vitamin D 25 hydroxy;  Future; Expected date: 08/24/2023    Vitamin D level is 24 6  Patient instructed to take a multivitamin OTC with vitamin D      6  Hyponatremia  -     Ambulatory Referral to Nephrology; Future    Patient referred to nephrology for further evaluation  7  Abnormal CBC  -     CBC and differential; Future; Expected date: 06/07/2023    RBC count is a little elevated  Discussed with patient that it may be due to dehydration  Proper hydration reviewed  Repeat CBC ordered  Will follow-up results with the patient  Reviewed lab results with the patient  Patient instructed to follow-up in 3 months or sooner prn  Subjective      Patient is here for a follow-up for chronic medical conditions  Patient is here for a follow-up for DM 2 and obesity  Patient reports that he watches his diet  Patient reports that he goes to the gym 3-5 days a week for 1 hour  Patient takes metformin 1,000mg BID and farxiga 5mg daily  Denies any nausea or vomiting  Patient takes crestor 5mg daily for hypertriglyceridemia  Patient takes lisinopril 30mg daily for HTN  Denies any chest pain, SOB, palpitations, or dizziness  Review of Systems   Constitutional: Negative for chills and fever  HENT: Negative for congestion, ear pain and sore throat  Respiratory: Negative for cough, chest tightness, shortness of breath and wheezing  Cardiovascular: Negative for chest pain, palpitations and leg swelling  Gastrointestinal: Negative for abdominal pain, blood in stool, diarrhea, nausea and vomiting  Genitourinary: Negative for dysuria, frequency, hematuria and urgency  Skin: Negative for rash  Neurological: Negative for dizziness, seizures, syncope and light-headedness         Current Outpatient Medications on File Prior to Visit   Medication Sig   • lisinopril (ZESTRIL) 30 mg tablet Take 1 tablet (30 mg total) by mouth daily   • metFORMIN (GLUCOPHAGE) 1000 MG tablet Take 1 tablet (1,000 mg total) by mouth 2 (two) times a day with "meals   • Multiple Vitamin (Multivitamin Adult) TABS Take 1 tablet by mouth daily   • Omega-3 Fatty Acids (fish oil) 1,000 mg Take 2 capsules (2,000 mg total) by mouth daily   • rosuvastatin (CRESTOR) 5 mg tablet Take 1 tablet (5 mg total) by mouth daily   • TREMFYA subcutaneous injection Inject 100 mg under the skin every 56 days    • [DISCONTINUED] dapagliflozin (Farxiga) 5 MG TABS Take 1 tablet (5 mg total) by mouth in the morning   • nabumetone (RELAFEN) 750 mg tablet Take 1 tablet (750 mg total) by mouth 2 (two) times a day       Objective     /80 (BP Location: Left arm, Patient Position: Sitting, Cuff Size: Large)   Pulse 85   Resp 16   Ht 5' 11\" (1 803 m)   Wt (!) 142 kg (314 lb)   SpO2 98%   BMI 43 79 kg/m²     Physical Exam  Vitals reviewed  Constitutional:       General: He is not in acute distress  Appearance: He is obese  He is not ill-appearing or diaphoretic  HENT:      Right Ear: External ear normal       Left Ear: External ear normal       Nose: Nose normal       Mouth/Throat:      Mouth: Mucous membranes are moist       Pharynx: Oropharynx is clear  No oropharyngeal exudate or posterior oropharyngeal erythema  Eyes:      Conjunctiva/sclera: Conjunctivae normal       Pupils: Pupils are equal, round, and reactive to light  Cardiovascular:      Rate and Rhythm: Normal rate and regular rhythm  Pulses: Normal pulses  Heart sounds: Normal heart sounds  Comments: No edema noted  Pulmonary:      Effort: Pulmonary effort is normal  No respiratory distress  Breath sounds: Normal breath sounds  No wheezing  Musculoskeletal:      Comments: Gait wnl  Skin:     Findings: No rash  Neurological:      Mental Status: He is alert and oriented to person, place, and time     Psychiatric:         Mood and Affect: Mood normal        Albino Begin, CRNP  "

## 2023-05-25 ENCOUNTER — TELEPHONE (OUTPATIENT)
Dept: NEPHROLOGY | Facility: CLINIC | Age: 48
End: 2023-05-25

## 2023-06-05 ENCOUNTER — EVALUATION (OUTPATIENT)
Dept: PHYSICAL THERAPY | Facility: CLINIC | Age: 48
End: 2023-06-05
Payer: COMMERCIAL

## 2023-06-05 DIAGNOSIS — G89.29 CHRONIC LEFT SHOULDER PAIN: ICD-10-CM

## 2023-06-05 DIAGNOSIS — M54.9 MID BACK PAIN: ICD-10-CM

## 2023-06-05 DIAGNOSIS — M25.512 CHRONIC LEFT SHOULDER PAIN: ICD-10-CM

## 2023-06-05 PROCEDURE — 97140 MANUAL THERAPY 1/> REGIONS: CPT

## 2023-06-05 PROCEDURE — 97162 PT EVAL MOD COMPLEX 30 MIN: CPT

## 2023-06-05 NOTE — PROGRESS NOTES
PT Evaluation     Today's date: 2023  Patient name: Glorianne Hamman  : 1975  MRN: [id number]  Referring provider: Almas Mosher, *  Dx:   Encounter Diagnosis     ICD-10-CM    1  Chronic left shoulder pain  M25 512 Ambulatory Referral to Physical Therapy    G89 29       2  Mid back pain  M54 9 Ambulatory Referral to Physical Therapy                     Assessment  Assessment details: Glorianne Hamman is a pleasant 50 y o  male presents with signs and symptoms consistent with:   Chronic left shoulder pain  Mid back pain    Problem List:  1) Hypomobility of shoulder capsule  2) Hypomobility of thoracic movement     Comparable signs:  1) Raising overhead   2) Rotating his arm    he has decreased shoulder and mid back mobility, which improved after capsular assessment, painful arc, and increased pain resulting in worry over not knowing what's wrong, fear of not being able to keep active and future ill health (and wanting to prevent it)  His presenting symptoms are in line with impingement and rotator cuff tendinopathy  These impairments listed above are preventing the patient from participating in functional activity  No further referral appears necessary at this time based upon examination results, and is negative for any red flags  Prognosis is good given HEP compliance and attendance to physical therapy 2x a week  Positive prognostic indicators include positive attitude toward recovery  Negative prognostic indicators include chronicity of symptoms and hypertension  Patent will benefit from skilled physical therapy at this time to address deficits to improve overall function and return to PLOF  Patient verbalized understanding of POC, HEP, and return demonstrated HEP  All questions were answered to patients satisfaction  Please contact me if you have any questions or recommendations  Thank you for the referral and the opportunity to share in [phone] [facility]          Impairments: abnormal coordination, abnormal muscle firing, abnormal or restricted ROM, abnormal movement, activity intolerance, impaired physical strength, lacks appropriate home exercise program, pain with function and scapular dyskinesis  Understanding of Dx/Px/POC: good   Prognosis: good    Goals  Impairment Goals 4-6 weeks  In order to improve and return to PLOF patient will be able to     - Decrease pain frequency/intensity/duration to 2/10  - Demonstrate symmetrical shoulder ROM with non involved shoulder without compensations  - Increase shoulder strength to 5/5 throughout  - Increase scapular strength to 5/5 throughout      Functional Goals 6-8 weeks  In order to improve and return to PLOF will be able to     - Participate in functional activities with no greater than 2/10 pain  - Increase Functional Status Measure (FOTO) to: predicted outcomes  - Be independent and compliant with HEP  - Participate in functional activities with good motor control   - Reach overhead without increased pain/compensation/difficulty  - Reach behind back without increased pain/compensation/difficulty   - Bench press pre injury weight  - Participate in upper body workout without pain  Plan  Patient would benefit from: skilled PT  Planned modality interventions: cryotherapy and electrical stimulation/Russian stimulation  Planned therapy interventions: joint mobilization, manual therapy, neuromuscular re-education, patient education, strengthening, stretching, therapeutic activities, therapeutic exercise, home exercise program, functional ROM exercises and postural training  Frequency: 2x week (2-3x week)  Duration in weeks: 8  Treatment plan discussed with: patient        Subjective Evaluation    History of Present Illness  Mechanism of injury: Patient presents to PT today with L shoulder and back pain for years  He notes that the L shoulder is hindering his ability to do things about January, after starting lifting weights    He lifts weights "and notes that any kind of pushing it hurts, and feels that it is going to give out  [place] especially, and is not able to do anything heavy  He notes sleeping on the side is an issue  He notes that if he could just \"crack\" his back it feels better  Lifts 3-5    Difficulty with: sleeping, raising arm overhead,   Pain  Pain scale: shoulder 0 back 2  At best pain ratin  At worst pain ratin (shoulder 7, back 5)  Location: front of the shoulder, back is mid back   Quality: dull ache, sharp and tight  Relieving factors: medications and heat  Aggravating factors: lifting  Progression: improved      Diagnostic Tests  X-ray: normal  Treatments  No previous or current treatments  Patient Goals  Patient goals for therapy: decreased pain, independence with ADLs/IADLs and return to sport/leisure activities  Patient goal: sleep on side, work out the way he likes to, more range         Objective     Concurrent Complaints  Positive for night pain and disturbed sleep  Negative for headaches, nausea/motion sickness, trouble swallowing, difficulty breathing and shortness of breath    Active Range of Motion   Cervical/Thoracic Spine       Cervical    Subcranial retraction:  WFL   Flexion:  WFL  Extension:  WFL  Left lateral flexion: Neck active lateral bend left: slight tightness noted  WFL  Right lateral flexion: Neck active lateral bend right: slight tightness noted  L  Left rotation: Neck active rotation left: slight tightness noted  [facility]  Right rotation: Neck active rotation right: slight tightness noted      [facility]    Thoracic    Flexion:  Restriction level: minimal  Extension:  Restriction level: moderate  Left lateral flexion:  Restriction level: minimal  Right lateral flexion:  Restriction level: minimal  Left rotation:  Restriction level: moderate  Right rotation:  Restriction level: moderate  Left Shoulder   Flexion: 80 (full ROM but pain ) degrees with pain  Abduction: 110 (160) degrees with " pain  External rotation BTH: T1   Internal rotation BTB: T10     Right Shoulder   Normal active range of motion    Additional Active Range of Motion Details  Compensations with thoracic, and upper trap     Passive Range of Motion   Left Shoulder   Flexion: 160 (ERP) degrees with pain  Abduction: 160 (ERP) degrees with pain    Right Shoulder   Normal passive range of motion    Joint Play   Left Shoulder  Hypomobile in the anterior capsule, posterior capsule and inferior capsule  Additional Joint Play Details  ROM and pain improved after capsular assessment  Mechanical Assessment    Cervical    Seated retraction: repeated movements   Pain location: no change  Seated Flexion:  repeated movements  Pain location: no change  Seated Extension: repeated movements  Pain location: no change    Thoracic      Lumbar      Tests     Left Shoulder   Positive belly press (pain), empty can, Hawkin's, painful arc and ULTT2  Negative drop arm, external rotation lag sign, full can and internal rotation lag sign       General Comments:    Upper quarter screen   Elbow: unremarkable  Hand/wrist: unremarkable  Neuro Exam:     Headaches   Patient reports headaches: No            Diagnosis:  L shoulder impingement/tendinopathy   Precautions:  HTN, T2DM   Comparable signs 1) Lifting over head   2) Rotating his arm   Primary Impairments: 1) Limited shoulder ROM  2) Limited thoracic extension   Patient Goals Return to working out    Manual Therapy 6/5        PA mobs thoracic         GV thoracic          PA mobs shoulder SP GII-GIV                          Exercise Diary          Therapeutic Exercise         Pullies         Sleeper stretch         Self mobs         Open book         Thoracic ext                           Neuromuscular Re-education         Scap squeeze                                                                        Therapeutic Activities                           Modalities

## 2023-06-06 ENCOUNTER — OFFICE VISIT (OUTPATIENT)
Dept: PHYSICAL THERAPY | Facility: CLINIC | Age: 48
End: 2023-06-06
Payer: COMMERCIAL

## 2023-06-06 DIAGNOSIS — M25.512 CHRONIC LEFT SHOULDER PAIN: Primary | ICD-10-CM

## 2023-06-06 DIAGNOSIS — M54.9 MID BACK PAIN: ICD-10-CM

## 2023-06-06 DIAGNOSIS — G89.29 CHRONIC LEFT SHOULDER PAIN: Primary | ICD-10-CM

## 2023-06-06 PROCEDURE — 97110 THERAPEUTIC EXERCISES: CPT

## 2023-06-06 PROCEDURE — 97530 THERAPEUTIC ACTIVITIES: CPT

## 2023-06-06 PROCEDURE — 97140 MANUAL THERAPY 1/> REGIONS: CPT

## 2023-06-06 NOTE — PROGRESS NOTES
"Daily Note     Today's date: 2023  Patient name: Jeanne Avina  : 1975  MRN: [id number]  Referring provider: Ottoniel Ruiz, *  Dx:   Encounter Diagnosis     ICD-10-CM    1  Chronic left shoulder pain  M25 512     G89 29       2  Mid back pain  M54 9                      Subjective: Patient noted a little sore after IE, but it felt okay  He noted that he was able to wash his head with less pain      Objective: See treatment diary below      Assessment: Tolerated treatment well  Patient had good response to mobility exercises today with decrease in pain with ROM  He notes pain right at 90 but decreases through ROM  Patient educated with HEP in order to perform prior to lifting  Patient demonstrated fatigue post treatment, exhibited good technique with therapeutic exercises and would benefit from continued PT      Plan: Continue per plan of care  Progress treatment as tolerated         Diagnosis:  L shoulder impingement/tendinopathy   Precautions:  HTN, T2DM   Comparable signs 1) Lifting over head   2) Rotating his arm   Primary Impairments: 1) Limited shoulder ROM  2) Limited thoracic extension   Patient Goals Return to working out    Manual Therapy        PA mobs thoracic  SP GIV       GV thoracic          PA mobs shoulder SP GII-GIV SP GII-GIV                         Exercise Diary          Therapeutic Exercise         Pullies/UBE  UBE /2 for blood flow and mobility       Sleeper stretch  30\"x3       Self mobs  20x       Open book  10x10\"       Thoracic ext  10x10\"        Pec stretc  30\"x3       Wall rolls  20x        Neuromuscular Re-education         Scap squeeze                                                                        Therapeutic Activities         Education   HEP prior to lifting                 Modalities                                      "

## 2023-06-09 ENCOUNTER — APPOINTMENT (OUTPATIENT)
Dept: PHYSICAL THERAPY | Facility: CLINIC | Age: 48
End: 2023-06-09
Payer: COMMERCIAL

## 2023-06-12 ENCOUNTER — OFFICE VISIT (OUTPATIENT)
Dept: PHYSICAL THERAPY | Facility: CLINIC | Age: 48
End: 2023-06-12
Payer: COMMERCIAL

## 2023-06-12 DIAGNOSIS — M54.9 MID BACK PAIN: ICD-10-CM

## 2023-06-12 DIAGNOSIS — G89.29 CHRONIC LEFT SHOULDER PAIN: Primary | ICD-10-CM

## 2023-06-12 DIAGNOSIS — M25.512 CHRONIC LEFT SHOULDER PAIN: Primary | ICD-10-CM

## 2023-06-12 PROCEDURE — 97140 MANUAL THERAPY 1/> REGIONS: CPT

## 2023-06-12 PROCEDURE — 97110 THERAPEUTIC EXERCISES: CPT

## 2023-06-12 NOTE — PROGRESS NOTES
"Daily Note     Today's date: 2023  Patient name: Norberto Trevizo  : 1975  MRN: [id number]  Referring provider: Nallely Diane, *  Dx:   Encounter Diagnosis     ICD-10-CM    1  Chronic left shoulder pain  M25 512     G89 29       2  Mid back pain  M54 9                      Subjective: Patient noted that his shoulder feels a bit better  He notes that laying on the shoulder is a little sore  Objective: See treatment diary below      Assessment: Tolerated treatment well  GV in HVLA performed on mid thoracic spine due to limited mobility, and followed up with thoracic extension  Patient had good response to mobility exercises and able to demonstrate improved shoulder ROM  Improved capsular mobility from last session, but still has pain with abduction at end range, but no issues with flexion  Patient demonstrated fatigue post treatment, exhibited good technique with therapeutic exercises and would benefit from continued PT      Plan: Continue per plan of care  Progress treatment as tolerated         Diagnosis:  L shoulder impingement/tendinopathy   Precautions:  HTN, T2DM   Comparable signs 1) Lifting over head   2) Rotating his arm   Primary Impairments: 1) Limited shoulder ROM  2) Limited thoracic extension   Patient Goals Return to working out    Manual Therapy       PA mobs thoracic  SP GIV SP       GV thoracic    SP consent SP      PA mobs shoulder SP GII-GIV SP GII-GIV SP GII-GIV                         Exercise Diary          Therapeutic Exercise         Pullies/UBE  UBE 2/2 for blood flow and mobility UBE 2/2 for BF and mobility       Sleeper stretch  30\"x3 30\"x3      Cervical snags    10x10\"       Self mobs  20x 20x abd       Open book  10x10\"       Thoracic ext  10x10\"  10x10\" FR       Rhomboid stretch   10x10\"       Pec stretc  30\"x3       Wall rolls  20x        Neuromuscular Re-education         Scap squeeze                                                                    " Therapeutic Activities         Education   HEP prior to lifting                 Modalities

## 2023-06-16 ENCOUNTER — OFFICE VISIT (OUTPATIENT)
Dept: PHYSICAL THERAPY | Facility: CLINIC | Age: 48
End: 2023-06-16
Payer: COMMERCIAL

## 2023-06-16 DIAGNOSIS — M25.512 CHRONIC LEFT SHOULDER PAIN: Primary | ICD-10-CM

## 2023-06-16 DIAGNOSIS — G89.29 CHRONIC LEFT SHOULDER PAIN: Primary | ICD-10-CM

## 2023-06-16 DIAGNOSIS — M54.9 MID BACK PAIN: ICD-10-CM

## 2023-06-16 PROCEDURE — 97110 THERAPEUTIC EXERCISES: CPT

## 2023-06-16 PROCEDURE — 97530 THERAPEUTIC ACTIVITIES: CPT

## 2023-06-16 PROCEDURE — 97112 NEUROMUSCULAR REEDUCATION: CPT

## 2023-06-16 PROCEDURE — 97140 MANUAL THERAPY 1/> REGIONS: CPT

## 2023-06-16 NOTE — PROGRESS NOTES
"Daily Note     Today's date: 2023  Patient name: William Najera  : 1975  MRN: [id number]  Referring provider: Flo Aguayo, *  Dx:   Encounter Diagnosis     ICD-10-CM    1  Chronic left shoulder pain  M25 512     G89 29       2  Mid back pain  M54 9                      Subjective: Patient notes that he feels looser but sorer       Objective: See treatment diary below      Assessment: Tolerated treatment well  GV in HVLA performed in prone to improve thoracic mobility  He noted mild limitations in [address] capusle with improvement  He was cued in scapular setting of his shoulder blades with minor upper trap hiking noted  Still demonstrates pinching at end ranges however improved with [address] mobs  Patient demonstrated fatigue post treatment, exhibited good technique with therapeutic exercises and would benefit from continued PT      Plan: Continue per plan of care  Progress treatment as tolerated         Diagnosis:  L shoulder impingement/tendinopathy   Precautions:  HTN, T2DM   Comparable signs 1) Lifting over head   2) Rotating his arm   Primary Impairments: 1) Limited shoulder ROM  2) Limited thoracic extension   Patient Goals Return to working out    Manual Therapy      PA mobs thoracic  SP GIV SP  SP thoracic     GV thoracic    SP consent SP      PA mobs shoulder SP GII-GIV SP GII-GIV SP GII-GIV  SP GII-GIV                       Exercise Diary          Therapeutic Exercise         Pullies/UBE  UBE 2/2 for blood flow and mobility UBE 2/2 for BF and mobility  UBE 2/2 for BF and mobility      Sleeper stretch  30\"x3 30\"x3      Cervical snags    10x10\"       Self mobs  20x 20x abd       Open book  10x10\"       Thoracic ext  10x10\"  10x10\" FR  10x10\" FR      Rhomboid stretch   10x10\"  10x10\"      Pec stretc  30\"x3       Wall rolls  20x   20x with LT lift off     Neuromuscular Re-education         Lower trap pull downs     17# 2x12     TRX rows     2x12                                       " Therapeutic Activities         Education   HEP prior to lifting                 Modalities

## 2023-06-19 ENCOUNTER — OFFICE VISIT (OUTPATIENT)
Dept: PHYSICAL THERAPY | Facility: CLINIC | Age: 48
End: 2023-06-19
Payer: COMMERCIAL

## 2023-06-19 DIAGNOSIS — M54.9 MID BACK PAIN: ICD-10-CM

## 2023-06-19 DIAGNOSIS — M25.512 CHRONIC LEFT SHOULDER PAIN: Primary | ICD-10-CM

## 2023-06-19 DIAGNOSIS — G89.29 CHRONIC LEFT SHOULDER PAIN: Primary | ICD-10-CM

## 2023-06-19 PROCEDURE — 97140 MANUAL THERAPY 1/> REGIONS: CPT

## 2023-06-19 PROCEDURE — 97110 THERAPEUTIC EXERCISES: CPT

## 2023-06-19 PROCEDURE — 97112 NEUROMUSCULAR REEDUCATION: CPT

## 2023-06-19 NOTE — PROGRESS NOTES
"Daily Note     Today's date: 2023  Patient name: Jacquelyn Mcgovern  : 1975  MRN: [id number]  Referring provider: Sylvia Pan, *  Dx:   Encounter Diagnosis     ICD-10-CM    1  Chronic left shoulder pain  M25 512     G89 29       2  Mid back pain  M54 9                      Subjective: Patient notes that he felt good after Friday, and is able to sleep on his left side  Objective: See treatment diary below      Assessment: Tolerated treatment well  Patient needed moderate verbal cueing to engage lower trap, to prevent upper trap hiking and pinching  Needed moderate cueing to engage shoulder blades with mild motor control defecits in upward rotators  Continue to address scapular stabilizers, and shoulder mobility to improve overhead motion  Patient demonstrated fatigue post treatment, exhibited good technique with therapeutic exercises and would benefit from continued PT      Plan: Continue per plan of care  Progress treatment as tolerated         Diagnosis:  L shoulder impingement/tendinopathy   Precautions:  HTN, T2DM   Comparable signs 1) Lifting over head   2) Rotating his arm   Primary Impairments: 1) Limited shoulder ROM  2) Limited thoracic extension   Patient Goals Return to working out    Manual Therapy     PA mobs thoracic  SP GIV SP  SP thoracic SP thoracic     GV thoracic    SP consent SP  SP    PA mobs shoulder SP GII-GIV SP GII-GIV SP GII-GIV  SP GII-GIV SP GII-GIV                       Exercise Diary          Therapeutic Exercise         Pullies/UBE  UBE 2/2 for blood flow and mobility UBE 2/2 for BF and mobility  UBE 2/2 for BF and mobility  UBE 2/2 for BF and mobility    Sleeper stretch  30\"x3 30\"x3  30\"x3    Cervical snags    10x10\"       Self mobs  20x 20x abd   20x abd/flx     Open book  10x10\"       Thoracic ext  10x10\"  10x10\" FR  10x10\" FR  10x10\" FR     Rhomboid stretch   10x10\"  10x10\"      Pec stretc  30\"x3       Wall rolls  20x   20x with LT lift " off 20x with LT lift off     Neuromuscular Re-education         Lower trap pull downs     17# 2x12 20# 2x12    TRX rows     2x12 2x12    Serratus push ups     20x                                                 Therapeutic Activities         Education   HEP prior to lifting                 Modalities

## 2023-06-20 ENCOUNTER — APPOINTMENT (OUTPATIENT)
Dept: PHYSICAL THERAPY | Facility: CLINIC | Age: 48
End: 2023-06-20
Payer: COMMERCIAL

## 2023-06-21 LAB
LEFT EYE DIABETIC RETINOPATHY: NORMAL
RIGHT EYE DIABETIC RETINOPATHY: NORMAL

## 2023-06-21 PROCEDURE — 2023F DILAT RTA XM W/O RTNOPTHY: CPT | Performed by: NURSE PRACTITIONER

## 2023-06-26 ENCOUNTER — OFFICE VISIT (OUTPATIENT)
Dept: PHYSICAL THERAPY | Facility: CLINIC | Age: 48
End: 2023-06-26
Payer: COMMERCIAL

## 2023-06-26 DIAGNOSIS — M54.9 MID BACK PAIN: ICD-10-CM

## 2023-06-26 DIAGNOSIS — G89.29 CHRONIC LEFT SHOULDER PAIN: Primary | ICD-10-CM

## 2023-06-26 DIAGNOSIS — M25.512 CHRONIC LEFT SHOULDER PAIN: Primary | ICD-10-CM

## 2023-06-26 PROCEDURE — 97140 MANUAL THERAPY 1/> REGIONS: CPT

## 2023-06-26 PROCEDURE — 97110 THERAPEUTIC EXERCISES: CPT

## 2023-06-26 PROCEDURE — 97112 NEUROMUSCULAR REEDUCATION: CPT

## 2023-06-26 PROCEDURE — 97530 THERAPEUTIC ACTIVITIES: CPT

## 2023-06-28 DIAGNOSIS — M25.562 PAIN IN BOTH KNEES, UNSPECIFIED CHRONICITY: Primary | ICD-10-CM

## 2023-06-28 DIAGNOSIS — M25.561 PAIN IN BOTH KNEES, UNSPECIFIED CHRONICITY: Primary | ICD-10-CM

## 2023-06-30 ENCOUNTER — OFFICE VISIT (OUTPATIENT)
Dept: PHYSICAL THERAPY | Facility: CLINIC | Age: 48
End: 2023-06-30
Payer: COMMERCIAL

## 2023-06-30 DIAGNOSIS — M54.9 MID BACK PAIN: ICD-10-CM

## 2023-06-30 DIAGNOSIS — M25.512 CHRONIC LEFT SHOULDER PAIN: Primary | ICD-10-CM

## 2023-06-30 DIAGNOSIS — G89.29 CHRONIC LEFT SHOULDER PAIN: Primary | ICD-10-CM

## 2023-06-30 PROCEDURE — 97112 NEUROMUSCULAR REEDUCATION: CPT

## 2023-06-30 PROCEDURE — 97530 THERAPEUTIC ACTIVITIES: CPT

## 2023-06-30 PROCEDURE — 97140 MANUAL THERAPY 1/> REGIONS: CPT

## 2023-06-30 PROCEDURE — 97110 THERAPEUTIC EXERCISES: CPT

## 2023-06-30 NOTE — PROGRESS NOTES
"Daily Note     Today's date: 2023  Patient name: Elías Sánchez  : 1975  MRN: [id number]  Referring provider: Drea Arceo, *  Dx:   Encounter Diagnosis     ICD-10-CM    1  Chronic left shoulder pain  M25 512     G89 29       2  Mid back pain  M54 9                      Subjective: Patient notes that he was able to lift with minimal discomfort, He still hasn't done bench pressing  Objective: See treatment diary below      Assessment: Tolerated treatment well  Continued to focus on overhead stability exercises and stabilization exercises today  He needed minor cueing to keep his shoulder blade engaged throughout session  Trailed bench pressing today with minimal discomfort  Improved with scapular engagement  He had some pinching but improved with neutral   Patient able to complete push ups today with no increase in sx  Patient demonstrated fatigue post treatment, exhibited good technique with therapeutic exercises and would benefit from continued PT      Plan: Continue per plan of care  Progress treatment as tolerated         Diagnosis:  L shoulder impingement/tendinopathy   Precautions:  HTN, T2DM   Comparable signs 1) Lifting over head   2) Rotating his arm   Primary Impairments: 1) Limited shoulder ROM  2) Limited thoracic extension   Patient Goals Return to working out    Manual Therapy    PA mobs thoracic SP thoracic  SP GIV SP  SP thoracic SP thoracic  SP thoracic    GV thoracic  SP   SP consent SP  SP SP    PA mobs shoulder SP GII-GIV SP GII-GIV SP GII-GIV  SP GII-GIV SP GII-GIV  SP GII-GIV                      Exercise Diary          Therapeutic Exercise         Pullies/UBE  UBE 2/2 for blood flow and mobility UBE 2/2 for BF and mobility  UBE 2/2 for BF and mobility  UBE 2/2 for BF and mobility UBE 2/2 for BF and mobility    Sleeper stretch  30\"x3 30\"x3  30\"x3    Cervical snags    10x10\"       Self mobs 20x abd/flx 20x 20x abd   20x abd/flx     Open " "book  10x10\"       Thoracic ext  10x10\"  10x10\" FR  10x10\" FR  10x10\" FR     Rhomboid stretch   10x10\"  10x10\"      Pec stretc  30\"x3       Wall rolls Walks GTB LT lift off  20x   20x with LT lift off 20x with LT lift off 20x w/ LT lift off    Row to ER  2x10 BTB        Bench press 2x10 25#         Neuromuscular Re-education         Lower trap pull downs     17# 2x12 20# 2x12 25# 2x12    TRX rows  2x12 w/ chin tuck   2x12 2x12 2x12 w/ chin tuck    Serratus push ups 2x12     20x 2x12   Chin tucks       2x12 standing    DNF endruance       2x12 5\"    Scap stab RMB 2x                          Therapeutic Activities         Education   HEP prior to lifting     Scapular setting, and chin tucks   OH Carries 2 laps 10#        Modalities                                      "

## 2023-07-03 ENCOUNTER — EVALUATION (OUTPATIENT)
Dept: PHYSICAL THERAPY | Facility: CLINIC | Age: 48
End: 2023-07-03
Payer: COMMERCIAL

## 2023-07-03 DIAGNOSIS — G89.29 CHRONIC LEFT SHOULDER PAIN: Primary | ICD-10-CM

## 2023-07-03 DIAGNOSIS — M54.9 MID BACK PAIN: ICD-10-CM

## 2023-07-03 DIAGNOSIS — M25.512 CHRONIC LEFT SHOULDER PAIN: Primary | ICD-10-CM

## 2023-07-03 PROCEDURE — 97140 MANUAL THERAPY 1/> REGIONS: CPT

## 2023-07-03 PROCEDURE — 97530 THERAPEUTIC ACTIVITIES: CPT

## 2023-07-03 NOTE — PROGRESS NOTES
PT Re-Evaluation     Today's date: 7/3/2023  Patient name: Lesly Jeong  : 1975  MRN: [id number]  Referring provider: Tj Mejia, *  Dx:   Encounter Diagnosis     ICD-10-CM    1. Chronic left shoulder pain  M25.512     G89.29       2. Mid back pain  M54.9                      Assessment  Assessment details: RE: 7/3/23 Lesly Jeong is a pleasant 50 y.o, male who presents with improvement in shoulder impingement. He is able to lift his arm higher without pain, however still continues to have pain at end range. Patient notes impaired motor control at top which is contributing to his shoulder pain. He still has limitations with thoracic and neck mobility although it is improving. He has been complaint with PT and HEP at this point and will continue to benefit from skilled PT in order to address existing limitations and progress back to PLOF with minimal discomfort. IE;Mario Aarti Yousif is a pleasant 50 y.o. male presents with signs and symptoms consistent with:   Chronic left shoulder pain  Mid back pain    Problem List:  1) Hypomobility of shoulder capsule  2) Hypomobility of thoracic movement     Comparable signs:  1) Raising overhead   2) Rotating his arm    he has decreased shoulder and mid back mobility, which improved after capsular assessment, painful arc, and increased pain resulting in worry over not knowing what's wrong, fear of not being able to keep active and future ill health (and wanting to prevent it). His presenting symptoms are in line with impingement and rotator cuff tendinopathy. These impairments listed above are preventing the patient from participating in functional activity. No further referral appears necessary at this time based upon examination results, and is negative for any red flags. Prognosis is good given HEP compliance and attendance to physical therapy 2x a week. Positive prognostic indicators include positive attitude toward recovery.   Negative prognostic indicators include chronicity of symptoms and hypertension. Patent will benefit from skilled physical therapy at this time to address deficits to improve overall function and return to PLOF. Patient verbalized understanding of POC, HEP, and return demonstrated HEP. All questions were answered to patients satisfaction. Please contact me if you have any questions or recommendations. Thank you for the referral and the opportunity to share in [phone]. Impairments: abnormal coordination, abnormal muscle firing, abnormal or restricted ROM, abnormal movement, activity intolerance, impaired physical strength, lacks appropriate home exercise program, pain with function and scapular dyskinesis  Understanding of Dx/Px/POC: good   Prognosis: good    Goals  Impairment Goals 4-6 weeks Progressing 7/3  In order to improve and return to PLOF patient will be able to. ..  - Decrease pain frequency/intensity/duration to 2/10  - Demonstrate symmetrical shoulder ROM with non involved shoulder without compensations  - Increase shoulder strength to 5/5 throughout  - Increase scapular strength to 5/5 throughout      Functional Goals 6-8 weeks progressing 7/3  In order to improve and return to PLOF will be able to. ..  - Participate in functional activities with no greater than 2/10 pain. - Increase Functional Status Measure (FOTO) to: predicted outcomes  - Be independent and compliant with HEP  - Participate in functional activities with good motor control.  - Reach overhead without increased pain/compensation/difficulty  - Reach behind back without increased pain/compensation/difficulty   - Bench press pre injury weight  - Participate in upper body workout without pain.          Plan  Patient would benefit from: skilled PT  Planned modality interventions: cryotherapy and electrical stimulation/Russian stimulation  Planned therapy interventions: joint mobilization, manual therapy, neuromuscular re-education, patient education, strengthening, stretching, therapeutic activities, therapeutic exercise, home exercise program, functional ROM exercises and postural training  Frequency: 2x week (2-3x week)  Duration in weeks: 4  Treatment plan discussed with: patient        Subjective Evaluation    History of Present Illness  Mechanism of injury: RE 7/3/23 Patient presents to PT today with 75% improvement with L shoulder and mid back pain. He notes that he gets the occasional hurt when sleeping on it/doing exercises which is not as often. He is not as inhibited with doing things as much. He notes that overall the neck and back is about 50-60% where it does feel always tight. IE:Patient presents to PT today with L shoulder and back pain for years. He notes that the L shoulder is hindering his ability to do things about January, after starting lifting weights . He lifts weights and notes that any kind of pushing it hurts, and feels that it is going to give out. [place] especially, and is not able to do anything heavy. He notes sleeping on the side is an issue. He notes that if he could just "crack" his back it feels better. Lifts 3-5 days a week    Difficulty with: sleeping, raising arm overhead,   Pain  Pain scale: shoulder 0 back 2. At best pain ratin  At worst pain ratin (shoulder 5, back 4)  Location: front of the shoulder, back is mid back   Quality: dull ache, sharp and tight  Relieving factors: medications and heat  Aggravating factors: lifting  Progression: improved      Diagnostic Tests  X-ray: normal  Treatments  No previous or current treatments  Patient Goals  Patient goals for therapy: decreased pain, independence with ADLs/IADLs and return to sport/leisure activities  Patient goal: sleep on side, work out the way he likes to, more range  (progressing )        Objective     Concurrent Complaints  Positive for night pain and disturbed sleep.  Negative for headaches, nausea/motion sickness, trouble swallowing, difficulty breathing and shortness of breath    Cervical/Thoracic Screen   Cervical range of motion within normal limits with the following exceptions: Limited in cervical ext 25%  Thoracic range of motion within normal limits with the following exceptions: Thoracic ext 75%  Thoracic rot 75%       Active Range of Motion   Cervical/Thoracic Spine       Cervical    Subcranial retraction:  WFL   Flexion:  WFL  Extension:  WFL  Left lateral flexion: Neck active lateral bend left: slight tightness noted. WFL  Right lateral flexion: Neck active lateral bend right: slight tightness noted. WFL  Left rotation: Neck active rotation left: slight tightness noted. WFL  Right rotation: Neck active rotation right: slight tightness noted. [facility]    Thoracic    Flexion:  Restriction level: minimal  Extension:  Restriction level: moderate  Left lateral flexion:  Restriction level: minimal  Right lateral flexion:  Restriction level: minimal  Left rotation:  Restriction level: moderate  Right rotation:  Restriction level: moderate  Left Shoulder   Flexion: 160 (pain at end range) degrees with pain  Abduction: 140 (ERP) degrees with pain  External rotation BTH: T3   Internal rotation BTB: T10 with pain    Right Shoulder   Normal active range of motion    Additional Active Range of Motion Details  Compensations with thoracic, and upper trap     Passive Range of Motion   Left Shoulder   Flexion: 160 (ERP) degrees with pain  Abduction: 160 (ERP) degrees with pain    Right Shoulder   Normal passive range of motion    Joint Play   Left Shoulder  Hypomobile in the anterior capsule, posterior capsule and inferior capsule.     Additional Joint Play Details  ROM and pain improved after capsular assessment  Mechanical Assessment    Cervical    Seated retraction: repeated movements   Pain location: no change  Seated Flexion:  repeated movements  Pain location: no change  Seated Extension: repeated movements  Pain location: no change    Thoracic      Lumbar      Strength/Myotome Testing     Right Shoulder     Planes of Motion   Flexion: 4   Abduction: 4+   External rotation at 0°: 4+   External rotation at 90°: 4-   Internal rotation at 0°: 4+   Internal rotation at 90°: 4+     Tests     Left Shoulder   Positive belly press (pain), empty can, Hawkin's, painful arc and ULTT2. Negative drop arm, external rotation lag sign, full can and internal rotation lag sign.      General Comments:    Upper quarter screen   Elbow: unremarkable  Hand/wrist: unremarkable  Neuro Exam:     Headaches   Patient reports headaches: No.           Diagnosis:  L shoulder impingement/tendinopathy   Precautions:  HTN, T2DM   Comparable signs 1) Lifting over head   2) Rotating his arm   Primary Impairments: 1) Limited shoulder ROM  2) Limited thoracic extension   Patient Goals Return to working out    Manual Therapy 6/30 6/6 6/12 6/16 6/19 6/26   PA mobs thoracic SP thoracic  SP GIV SP  SP thoracic SP thoracic  SP thoracic    GV thoracic  SP   SP consent SP  SP SP    PA mobs shoulder SP GII-GIV SP GII-GIV SP GII-GIV  SP GII-GIV SP GII-GIV  SP GII-GIV                      Exercise Diary          Therapeutic Exercise         Pullies/UBE  UBE 2/2 for blood flow and mobility UBE 2/2 for BF and mobility  UBE 2/2 for BF and mobility  UBE 2/2 for BF and mobility UBE 2/2 for BF and mobility    Sleeper stretch  30"x3 30"x3  30"x3    Cervical snags    10x10"       Self mobs 20x abd/flx 20x 20x abd   20x abd/flx     Open book  10x10"       Thoracic ext  10x10"  10x10" FR  10x10" FR  10x10" FR     Rhomboid stretch   10x10"  10x10"      Pec stretc  30"x3       Wall rolls Walks GTB LT lift off  20x   20x with LT lift off 20x with LT lift off 20x w/ LT lift off    Row to ER  2x10 BTB        Bench press 2x10 25#         Neuromuscular Re-education         Lower trap pull downs     17# 2x12 20# 2x12 25# 2x12    TRX rows  2x12 w/ chin tuck   2x12 2x12 2x12 w/ chin tuck    Serratus push ups 2x12     20x 2x12   Chin tucks       2x12 standing    DNF endruance       2x12 5"    Scap stab RMB 2x                          Therapeutic Activities         Education   HEP prior to lifting     Scapular setting, and chin tucks   OH Carries 2 laps 10#        Modalities

## 2023-07-07 ENCOUNTER — OFFICE VISIT (OUTPATIENT)
Dept: PHYSICAL THERAPY | Facility: CLINIC | Age: 48
End: 2023-07-07
Payer: COMMERCIAL

## 2023-07-07 DIAGNOSIS — G89.29 CHRONIC LEFT SHOULDER PAIN: Primary | ICD-10-CM

## 2023-07-07 DIAGNOSIS — M25.512 CHRONIC LEFT SHOULDER PAIN: Primary | ICD-10-CM

## 2023-07-07 DIAGNOSIS — M54.9 MID BACK PAIN: ICD-10-CM

## 2023-07-07 PROCEDURE — 97530 THERAPEUTIC ACTIVITIES: CPT

## 2023-07-07 PROCEDURE — 97140 MANUAL THERAPY 1/> REGIONS: CPT

## 2023-07-07 PROCEDURE — 97110 THERAPEUTIC EXERCISES: CPT

## 2023-07-07 PROCEDURE — 97112 NEUROMUSCULAR REEDUCATION: CPT

## 2023-07-07 NOTE — PROGRESS NOTES
Daily Note     Today's date: 2023  Patient name: Prema Bansal  : 1975  MRN: [id number]  Referring provider: Mateo Espinoza, *  Dx:   Encounter Diagnosis     ICD-10-CM    1. Chronic left shoulder pain  M25.512     G89.29       2. Mid back pain  M54.9                      Subjective: Paitnet notes that sleeping has been a bit more difficult due to discomfort but not pain. Objective: See treatment diary below      Assessment: Tolerated treatment well. Progress patient with stabilization exercises because he has some weakness still with overhead exercises. Patient did have some mild discomfort with progression of exercise however resolved after active stretching and PT mobilizations. Patient did need minor cueing to prevent compensations with upper trap hiking. Patient demonstrated fatigue post treatment, exhibited good technique with therapeutic exercises and would benefit from continued PT      Plan: Continue per plan of care.       Diagnosis:  L shoulder impingement/tendinopathy   Precautions:  HTN, T2DM   Comparable signs 1) Lifting over head   2) Rotating his arm   Primary Impairments: 1) Limited shoulder ROM  2) Limited thoracic extension   Patient Goals Return to working out    Manual Therapy    PA mobs thoracic SP thoracic   SP  SP thoracic SP thoracic  SP thoracic    GV thoracic  SP   SP consent SP  SP SP    PA mobs shoulder SP GII-GIV SP GII-GIV SP GII-GIV  SP GII-GIV SP GII-GIV  SP GII-GIV                      Exercise Diary          Therapeutic Exercise         Pullies/UBE   UBE 2/2 for BF and mobility  UBE 2/2 for BF and mobility  UBE 2/2 for BF and mobility UBE 2/2 for BF and mobility    Sleeper stretch  30"x3 30"x3  30"x3    Cervical snags    10x10"       Self mobs 20x abd/flx 20x abd/flx 20x abd   20x abd/flx     Open book         Thoracic ext   10x10" FR  10x10" FR  10x10" FR     Rhomboid stretch   10x10"  10x10"      Pec stretc  10x10"        Wall rolls Walks GTB LT lift off  Walks GTB LT lift off 2x8  20x with LT lift off 20x with LT lift off 20x w/ LT lift off    Row to ER  2x10 BTB        Bench press 2x10 25#         Neuromuscular Re-education         Lower trap pull downs   30# 2x12  17# 2x12 20# 2x12 25# 2x12    TRX rows   2x10 ecc   2x12 2x12 2x12 w/ chin tuck    Serratus push ups 2x12  Serratus sliders 2x12 BTB   20x 2x12   Chin tucks       2x12 standing    DNF endruance       2x12 5"    Scap stab RMB 2x RMB 2x       Wall clocks   2x8 BTB                Therapeutic Activities         Education       Scapular setting, and chin tucks   OH Carries 2 laps 10#        Modalities

## 2023-07-18 ENCOUNTER — OFFICE VISIT (OUTPATIENT)
Dept: PHYSICAL THERAPY | Facility: CLINIC | Age: 48
End: 2023-07-18
Payer: COMMERCIAL

## 2023-07-18 DIAGNOSIS — G89.29 CHRONIC LEFT SHOULDER PAIN: Primary | ICD-10-CM

## 2023-07-18 DIAGNOSIS — M25.512 CHRONIC LEFT SHOULDER PAIN: Primary | ICD-10-CM

## 2023-07-18 DIAGNOSIS — M54.9 MID BACK PAIN: ICD-10-CM

## 2023-07-18 PROCEDURE — 97140 MANUAL THERAPY 1/> REGIONS: CPT

## 2023-07-18 PROCEDURE — 97110 THERAPEUTIC EXERCISES: CPT

## 2023-07-18 PROCEDURE — 97112 NEUROMUSCULAR REEDUCATION: CPT

## 2023-07-18 NOTE — PROGRESS NOTES
Daily Note     Today's date: 2023  Patient name: Tiffany Painter  : 1975  MRN: [id number]  Referring provider: Joselin Marquez, *  Dx:   Encounter Diagnosis     ICD-10-CM    1. Chronic left shoulder pain  M25.512     G89.29       2. Mid back pain  M54.9                      Subjective: Paitnet notes that ROM is still limited at the top. Objective: See treatment diary below      Assessment: Tolerated treatment well. Patient did have limited 1st Rib mob restriction on left side. Mobilization improved after and less pain at top. Followed up with scap and upper trap stabilizers. Patient demonstrated fatigue post treatment, exhibited good technique with therapeutic exercises and would benefit from continued PT      Plan: Continue per plan of care.       Diagnosis:  L shoulder impingement/tendinopathy   Precautions:  HTN, T2DM   Comparable signs 1) Lifting over head   2) Rotating his arm   Primary Impairments: 1) Limited shoulder ROM  2) Limited thoracic extension   Patient Goals Return to working out    Manual Therapy    PA mobs thoracic SP thoracic   SP  SP thoracic SP thoracic  SP thoracic    GV thoracic  SP    SP  SP SP    PA mobs shoulder SP GII-GIV SP GII-GIV  SP GII-GIV SP GII-GIV  SP GII-GIV    1st rib mobs    SP GIII-GIV               Exercise Diary          Therapeutic Exercise         Pullies/UBE   UBE 2/2 for bloodflow and mobility  UBE 2/2 for BF and mobility  UBE 2/2 for BF and mobility UBE 2/2 for BF and mobility    Sleeper stretch  30"x3   30"x3    Cervical snags          Self mobs 20x abd/flx 20x abd/flx 2x10 1st Rib   20x abd/flx     Open book         Thoracic ext    10x10" FR  10x10" FR     Rhomboid stretch    10x10"      Pec stretc  10x10"        Wall rolls Walks GTB LT lift off  Walks GTB LT lift off 2x8 Walks GTB LT 2x8 20x with LT lift off 20x with LT lift off 20x w/ LT lift off    Row to ER  2x10 BTB        Bench press 2x10 25# Neuromuscular Re-education         Lower trap pull downs   30# 2x12  17# 2x12 20# 2x12 25# 2x12    TRX rows   2x10 ecc   2x12 2x12 2x12 w/ chin tuck    Serratus push ups 2x12  Serratus sliders 2x12 BTB   20x 2x12   Chin tucks       2x12 standing    DNF endruance       2x12 5"    Scap stab RMB 2x RMB 2x       Wall clocks   2x8 BTB                Therapeutic Activities         Education       Scapular setting, and chin tucks   OH Carries 2 laps 10#        Modalities

## 2023-07-21 ENCOUNTER — OFFICE VISIT (OUTPATIENT)
Dept: PHYSICAL THERAPY | Facility: CLINIC | Age: 48
End: 2023-07-21
Payer: COMMERCIAL

## 2023-07-21 DIAGNOSIS — M25.512 CHRONIC LEFT SHOULDER PAIN: Primary | ICD-10-CM

## 2023-07-21 DIAGNOSIS — M54.9 MID BACK PAIN: ICD-10-CM

## 2023-07-21 DIAGNOSIS — E11.65 TYPE 2 DIABETES MELLITUS WITH HYPERGLYCEMIA, WITHOUT LONG-TERM CURRENT USE OF INSULIN (HCC): ICD-10-CM

## 2023-07-21 DIAGNOSIS — I10 ESSENTIAL HYPERTENSION: ICD-10-CM

## 2023-07-21 DIAGNOSIS — G89.29 CHRONIC LEFT SHOULDER PAIN: Primary | ICD-10-CM

## 2023-07-21 PROCEDURE — 97112 NEUROMUSCULAR REEDUCATION: CPT

## 2023-07-21 PROCEDURE — 97140 MANUAL THERAPY 1/> REGIONS: CPT

## 2023-07-21 PROCEDURE — 97530 THERAPEUTIC ACTIVITIES: CPT

## 2023-07-21 PROCEDURE — 97110 THERAPEUTIC EXERCISES: CPT

## 2023-07-21 RX ORDER — LISINOPRIL 30 MG/1
30 TABLET ORAL DAILY
Qty: 90 TABLET | Refills: 0 | Status: SHIPPED | OUTPATIENT
Start: 2023-07-21 | End: 2023-07-28 | Stop reason: SDUPTHER

## 2023-07-21 NOTE — PROGRESS NOTES
Daily Note     Today's date: 2023  Patient name: Shahbaz Platt  : 1975  MRN: [id number]  Referring provider: Scot Bates, *  Dx:   Encounter Diagnosis     ICD-10-CM    1. Chronic left shoulder pain  M25.512     G89.29       2. Mid back pain  M54.9                      Subjective: Paitnet notes that he feels better than last week. Patient notes that he felt good after last session. Objective: See treatment diary below      Assessment: Tolerated treatment well. Patient did have some residual tightness in shoulder. Performed CTJ HVLA GV in prone with caviation noted. Patient had improved shoulder motion afterward with session focused primarily on stabilization after new found mobility. Trialed bench press again today with decrease in symptoms after VC for setting. He had some mild discomfort with planks but improved after wide . Patient demonstrated fatigue post treatment, exhibited good technique with therapeutic exercises and would benefit from continued PT      Plan: Continue per plan of care.       Diagnosis:  L shoulder impingement/tendinopathy   Precautions:  HTN, T2DM   Comparable signs 1) Lifting over head   2) Rotating his arm   Primary Impairments: 1) Limited shoulder ROM  2) Limited thoracic extension   Patient Goals Return to working out    Manual Therapy    PA mobs thoracic SP thoracic   SP  SP  SP thoracic  SP thoracic    GV thoracic  SP    CTJ SP james  SP SP    PA mobs shoulder SP GII-GIV SP GII-GIV   SP GII-GIV  SP GII-GIV    1st rib mobs    SP GIII-GIV SP GIII-GI               Exercise Diary          Therapeutic Exercise         Pullies/UBE   UBE 2/2 for bloodflow and mobility  Ube 2/2 for bloodflow UBE 2/2 for BF and mobility UBE 2/2 for BF and mobility    Sleeper stretch  30"x3   30"x3    Cervical snags          Self mobs 20x abd/flx 20x abd/flx 2x10 1st Rib  2x10 1st Rib  20x abd/flx     Open book         Thoracic ext     10x10" FR Rhomboid stretch         Pec stretc  10x10"        Wall rolls Walks GTB LT lift off  Walks GTB LT lift off 2x8 Walks GTB LT 2x8 Walks BTB 2x10 20x with LT lift off 20x w/ LT lift off    Row to ER  2x10 BTB        Bench press 2x10 25#   2x10 25#      Neuromuscular Re-education         Lower trap pull downs   30# 2x12   20# 2x12 25# 2x12    TRX rows   2x10 ecc   2x10 w/ ER  2x12 2x12 w/ chin tuck    Serratus push ups 2x12  Serratus sliders 2x12 BTB  Planks 20"x3 20x 2x12   Chin tucks       2x12 standing    DNF endruance       2x12 5"    Scap stab RMB 2x RMB 2x  RMB ABC 2x     Wall clocks   2x8 BTB                Therapeutic Activities         Education       Scapular setting, and chin tucks   OH Carries 2 laps 10#   2 laps 10#     Modalities

## 2023-07-25 ENCOUNTER — OFFICE VISIT (OUTPATIENT)
Dept: PHYSICAL THERAPY | Facility: CLINIC | Age: 48
End: 2023-07-25
Payer: COMMERCIAL

## 2023-07-25 DIAGNOSIS — M54.9 MID BACK PAIN: ICD-10-CM

## 2023-07-25 DIAGNOSIS — M25.512 CHRONIC LEFT SHOULDER PAIN: Primary | ICD-10-CM

## 2023-07-25 DIAGNOSIS — G89.29 CHRONIC LEFT SHOULDER PAIN: Primary | ICD-10-CM

## 2023-07-25 PROCEDURE — 97112 NEUROMUSCULAR REEDUCATION: CPT

## 2023-07-25 PROCEDURE — 97110 THERAPEUTIC EXERCISES: CPT

## 2023-07-25 PROCEDURE — 97140 MANUAL THERAPY 1/> REGIONS: CPT

## 2023-07-25 PROCEDURE — 97530 THERAPEUTIC ACTIVITIES: CPT

## 2023-07-25 NOTE — PROGRESS NOTES
Daily Note     Today's date: 2023  Patient name: Seth Valdivia  : 1975  MRN: [id number]  Referring provider: Jolly Chen, *  Dx:   Encounter Diagnosis     ICD-10-CM    1. Chronic left shoulder pain  M25.512     G89.29       2. Mid back pain  M54.9                      Subjective: Patient states he was away for a week, yesterday was his first day back in the gym and he pushed it, he is a bit sore today. Objective: See treatment diary below      Assessment: Patient doing well with charted program. He cont to have decreased pain and improved mobility following mobs by evaluating PT. He notes pain with planks last session so modified to raised surface, increased hold times with patient able to maintain good form and minimal discomfort. Added band pull aparts in all planes to address strength and stability. Carries performed with KB to challenge stabilization as well, he is able to scap set and maintain but does have pain anteriorly in the [place] position. Patient demonstrated fatigue post treatment and would benefit from continued PT      Plan: Progress treatment as tolerated.        Diagnosis:  L shoulder impingement/tendinopathy   Precautions:  HTN, T2DM   Comparable signs 1) Lifting over head   2) Rotating his arm   Primary Impairments: 1) Limited shoulder ROM  2) Limited thoracic extension   Patient Goals Return to working out    Manual Therapy     PA mobs thoracic SP thoracic   SP  SP  SP    GV thoracic  SP    CTJ SP james  CTJ SP james    PA mobs shoulder SP GII-GIV SP GII-GIV       1st rib mobs    SP GIII-GIV SP GIII-GI  SP GIII-GI             Exercise Diary          Therapeutic Exercise         Pullies/UBE   UBE 2/2 for bloodflow and mobility  Ube 2/2 for bloodflow UBE 2/2 for bloodflow    Sleeper stretch  30"x3       Cervical snags          Self mobs 20x abd/flx 20x abd/flx 2x10 1st Rib  2x10 1st Rib  2x`10 1st Rib    Open book         Thoracic ext         Rhomboid stretch         Pec stretc  10x10"        Wall rolls Walks GTB LT lift off  Walks GTB LT lift off 2x8 Walks GTB LT 2x8 Walks BTB 2x10 Walks BTB 2x10    Row to ER  2x10 BTB        Bench press 2x10 25#   2x10 25#     Neuromuscular Re-education         Lower trap pull downs   30# 2x12       TRX rows   2x10 ecc   2x10 w/ ER  2x10 w ER     Serratus push ups 2x12  Serratus sliders 2x12 BTB  Planks 20"x3 Elevated plank 40" at alter G  40" at table     Chin tucks          DNF endruance          Scap stab RMB 2x RMB 2x  RMB ABC 2x YMB  ABC 2x     Wall clocks   2x8 BTB       Band pull aparts neutral & B/L diagonal     Alternating    10x ea GTB     Therapeutic Activities         Education          OH Carries 2 laps 10#   2 laps 10# 90/90 10# KB 3 laps  OH `10# KB 2 laps    Modalities

## 2023-07-28 ENCOUNTER — APPOINTMENT (OUTPATIENT)
Dept: PHYSICAL THERAPY | Facility: CLINIC | Age: 48
End: 2023-07-28
Payer: COMMERCIAL

## 2023-07-28 DIAGNOSIS — E11.65 TYPE 2 DIABETES MELLITUS WITH HYPERGLYCEMIA, WITHOUT LONG-TERM CURRENT USE OF INSULIN (HCC): ICD-10-CM

## 2023-07-28 DIAGNOSIS — I10 ESSENTIAL HYPERTENSION: ICD-10-CM

## 2023-07-28 RX ORDER — LISINOPRIL 30 MG/1
30 TABLET ORAL DAILY
Qty: 90 TABLET | Refills: 0 | Status: SHIPPED | OUTPATIENT
Start: 2023-07-28

## 2023-08-01 ENCOUNTER — OFFICE VISIT (OUTPATIENT)
Dept: PHYSICAL THERAPY | Facility: CLINIC | Age: 48
End: 2023-08-01
Payer: COMMERCIAL

## 2023-08-01 DIAGNOSIS — M25.512 CHRONIC LEFT SHOULDER PAIN: Primary | ICD-10-CM

## 2023-08-01 DIAGNOSIS — M54.9 MID BACK PAIN: ICD-10-CM

## 2023-08-01 DIAGNOSIS — G89.29 CHRONIC LEFT SHOULDER PAIN: Primary | ICD-10-CM

## 2023-08-01 PROCEDURE — 97530 THERAPEUTIC ACTIVITIES: CPT

## 2023-08-01 PROCEDURE — 97110 THERAPEUTIC EXERCISES: CPT

## 2023-08-01 PROCEDURE — 97140 MANUAL THERAPY 1/> REGIONS: CPT

## 2023-08-01 NOTE — PROGRESS NOTES
Daily Note     Today's date: 2023  Patient name: Neno Rosenberg  : 1975  MRN: [id number]  Referring provider: Sabine Pappas, *  Dx:   Encounter Diagnosis     ICD-10-CM    1. Chronic left shoulder pain  M25.512     G89.29       2. Mid back pain  M54.9                      Subjective: Patient noted some soreness with neck, but shoulder is feeling good. Objective: See treatment diary below      Assessment: Patient doing well with charted program. Addressed more CTJ and upper cervical spine and rib mobility today secondary to impaired motion. Decreased sx and improved motion post session. Patient responded well to treatment. Advised for chin tucks and upper thoracic mobility to be performed at home. Patient demonstrated fatigue post treatment and would benefit from continued PT      Plan: Progress treatment as tolerated.        Diagnosis:  L shoulder impingement/tendinopathy   Precautions:  HTN, T2DM   Comparable signs 1) Lifting over head   2) Rotating his arm   Primary Impairments: 1) Limited shoulder ROM  2) Limited thoracic extension   Patient Goals Return to working out    Manual Therapy    PA mobs thoracic SP thoracic   SP  SP  SP SP    GV thoracic  SP    CTJ SP james  CTJ SP james CRJ SP james   PA mobs shoulder SP GII-GIV SP GII-GIV       1st rib mobs    SP GIII-GIV SP GIII-GI  SP GIII-GI SP GII-GIV    Upper thoracic mobs      SP GII-GIV    Exercise Diary          Therapeutic Exercise         Pullies/UBE   UBE 2/2 for bloodflow and mobility  Ube 2/2 for bloodflow UBE 2/2 for bloodflow UBE 2/2 for BF    Sleeper stretch  30"x3       Cervical snags          Self mobs 20x abd/flx 20x abd/flx 2x10 1st Rib  2x10 1st Rib  2x`10 1st Rib    Open book         Thoracic ext      Butterfly 2x10 supine and standing   Rhomboid stretch         Pec stretc  10x10"        Wall rolls Walks GTB LT lift off  Walks GTB LT lift off 2x8 Walks GTB LT 2x8 Walks BTB 2x10 Walks BTB 2x10    Row to ER  2x10 BTB        Bench press 2x10 25#   2x10 25#     Neuromuscular Re-education         Lower trap pull downs   30# 2x12       TRX rows   2x10 ecc   2x10 w/ ER  2x10 w ER     Serratus push ups 2x12  Serratus sliders 2x12 BTB  Planks 20"x3 Elevated plank 40" at alter G  40" at table     Chin tucks          DNF endruance          Scap stab RMB 2x RMB 2x  RMB ABC 2x YMB  ABC 2x     Wall clocks   2x8 BTB       Band pull aparts neutral & B/L diagonal     Alternating    10x ea GTB     Therapeutic Activities         Education          OH Carries 2 laps 10#   2 laps 10# 90/90 10# KB 3 laps  OH `10# KB 2 laps    Modalities

## 2023-08-04 ENCOUNTER — OFFICE VISIT (OUTPATIENT)
Dept: PHYSICAL THERAPY | Facility: CLINIC | Age: 48
End: 2023-08-04
Payer: COMMERCIAL

## 2023-08-04 DIAGNOSIS — M54.9 MID BACK PAIN: ICD-10-CM

## 2023-08-04 DIAGNOSIS — M25.512 CHRONIC LEFT SHOULDER PAIN: Primary | ICD-10-CM

## 2023-08-04 DIAGNOSIS — G89.29 CHRONIC LEFT SHOULDER PAIN: Primary | ICD-10-CM

## 2023-08-04 PROCEDURE — 97112 NEUROMUSCULAR REEDUCATION: CPT

## 2023-08-04 PROCEDURE — 97140 MANUAL THERAPY 1/> REGIONS: CPT

## 2023-08-04 PROCEDURE — 97530 THERAPEUTIC ACTIVITIES: CPT

## 2023-08-04 PROCEDURE — 97110 THERAPEUTIC EXERCISES: CPT

## 2023-08-04 NOTE — PROGRESS NOTES
Daily Note     Today's date: 2023  Patient name: Ken Holman  : 1975  MRN: [id number]  Referring provider: Марина Ellis, *  Dx:   Encounter Diagnosis     ICD-10-CM    1. Chronic left shoulder pain  M25.512     G89.29       2. Mid back pain  M54.9                      Subjective: Patient noted some soreness with neck, but shoulder is feeling good. Objective: See treatment diary below      Assessment: Patient doing well with charted program. Patient had slight improvement from last session. Patient mobility has significantly improved after addressing mobility but is still challenged with motor control of R side as noted with chin tucks and rotaiton with NM fatigue noted. He needed verbal cues to maintain chin tuck. Resolved with IASTM to allow for decrease pain post session. Patient demonstrated fatigue post treatment and would benefit from continued PT      Plan: Progress treatment as tolerated.        Diagnosis:  L shoulder impingement/tendinopathy   Precautions:  HTN, T2DM   Comparable signs 1) Lifting over head   2) Rotating his arm   Primary Impairments: 1) Limited shoulder ROM  2) Limited thoracic extension   Patient Goals Return to working out    Manual Therapy    PA mobs thoracic SP   SP  SP  SP SP    GV thoracic  CTJ SP james    CTJ SP james  CTJ SP james CRJ SP james   PA mobs shoulder  SP GII-GIV       1st rib mobs  SP GII-GIV  SP GIII-GIV SP GIII-GI  SP GIII-GI SP GII-GIV    Upper thoracic mobs SP GII-GIV        IASTM  UT      SP GII-GIV    Exercise Diary          Therapeutic Exercise         Pullies/UBE UBE 2/2 for BF  UBE 2/2 for bloodflow and mobility  Ube 2/2 for bloodflow UBE 2/2 for bloodflow UBE 2/2 for BF    Sleeper stretch  30"x3       Cervical snags          Self mobs  20x abd/flx 2x10 1st Rib  2x10 1st Rib  2x`10 1st Rib    Open book         Thoracic ext Butterfly 2x10      Butterfly 2x10 supine and standing   Retract/extension  2x10        Pec stretc 10x10"        Wall rolls  Walks GTB LT lift off 2x8 Walks GTB LT 2x8 Walks BTB 2x10 Walks BTB 2x10    Row to ER          Bench press    2x10 25#     Neuromuscular Re-education         Lower trap pull downs   30# 2x12       TRX rows  2x10 2x10 ecc   2x10 w/ ER  2x10 w ER     Serratus push ups  Serratus sliders 2x12 BTB  Planks 20"x3 Elevated plank 40" at alter G  40" at table     Chin tucks  Prone 10   With rotation 10        DNF endruance          Scap stab  RMB 2x  RMB ABC 2x YMB  ABC 2x     Wall clocks   2x8 BTB       Band pull aparts neutral & B/L diagonal     Alternating    10x ea GTB     Therapeutic Activities         Education          OH Carries    2 laps 10# 90/90 10# KB 3 laps  OH `10# KB 2 laps    Modalities

## 2023-08-08 ENCOUNTER — APPOINTMENT (OUTPATIENT)
Dept: PHYSICAL THERAPY | Facility: CLINIC | Age: 48
End: 2023-08-08
Payer: COMMERCIAL

## 2023-08-11 ENCOUNTER — APPOINTMENT (OUTPATIENT)
Dept: PHYSICAL THERAPY | Facility: CLINIC | Age: 48
End: 2023-08-11
Payer: COMMERCIAL

## 2023-08-11 DIAGNOSIS — E11.65 TYPE 2 DIABETES MELLITUS WITH HYPERGLYCEMIA, WITHOUT LONG-TERM CURRENT USE OF INSULIN (HCC): ICD-10-CM

## 2023-08-15 ENCOUNTER — OFFICE VISIT (OUTPATIENT)
Dept: PHYSICAL THERAPY | Facility: CLINIC | Age: 48
End: 2023-08-15
Payer: COMMERCIAL

## 2023-08-15 DIAGNOSIS — M54.9 MID BACK PAIN: ICD-10-CM

## 2023-08-15 DIAGNOSIS — M25.512 CHRONIC LEFT SHOULDER PAIN: Primary | ICD-10-CM

## 2023-08-15 DIAGNOSIS — G89.29 CHRONIC LEFT SHOULDER PAIN: Primary | ICD-10-CM

## 2023-08-15 PROCEDURE — 97110 THERAPEUTIC EXERCISES: CPT

## 2023-08-15 PROCEDURE — 97140 MANUAL THERAPY 1/> REGIONS: CPT

## 2023-08-15 PROCEDURE — 97112 NEUROMUSCULAR REEDUCATION: CPT

## 2023-08-15 NOTE — PROGRESS NOTES
Daily Note     Today's date: 8/15/2023  Patient name: Prema Bansal  : 1975  MRN: [id number]  Referring provider: Mateo Espinoza, *  Dx:   Encounter Diagnosis     ICD-10-CM    1. Chronic left shoulder pain  M25.512     G89.29       2. Mid back pain  M54.9                      Subjective: Patient noted that he had taken a week off and now his neck and shoulder had reduced but going back to benching his shoulder still feels weak. Patient       Objective: See treatment diary below      Assessment: Patient doing well with outlined program. Stabilization focused primarily today. Patient notes slight impingement pain with scap push ups, but improved after first rib mobility. He needed verbal cueing for scapular engagement Patient demonstrated fatigue post treatment and would benefit from continued PT      Plan: Progress treatment as tolerated.        Diagnosis:  L shoulder impingement/tendinopathy   Precautions:  HTN, T2DM   Comparable signs 1) Lifting over head   2) Rotating his arm   Primary Impairments: 1) Limited shoulder ROM  2) Limited thoracic extension   Patient Goals Return to working out    Manual Therapy 8/4 8/15 7/18 7/21 7/25 8/1   PA mobs thoracic SP   SP  SP  SP SP    GV thoracic  CTJ SP james    CTJ SP james  CTJ SP jamse CRJ SP james   PA mobs shoulder  SP GII-GIV       1st rib mobs  SP GII-GIV  SP GIII-GIV SP GIII-GI  SP GIII-GI SP GII-GIV    Upper thoracic mobs SP GII-GIV        IASTM  UT      SP GII-GIV    Exercise Diary          Therapeutic Exercise         Pullies/UBE UBE 2/2 for BF UBE 2/2 UBE 2/2 for bloodflow and mobility  Ube 2/2 for bloodflow UBE 2/2 for bloodflow UBE 2/2 for BF    Sleeper stretch         Cervical snags          Self mobs  20x 1st Rib  2x10 1st Rib  2x10 1st Rib  2x`10 1st Rib    Open book         Thoracic ext Butterfly 2x10  Butterfly 20x  extension    Butterfly 2x10 supine and standing   Retract/extension  2x10 2x10       Pec stretc         Wall rolls   Walks GTB LT 2x8 Walks BTB 2x10 Walks BTB 2x10    Row to ER          Bench press    2x10 25#     Neuromuscular Re-education         Serratus punches  W/ BTB 2x10       TRX rows  2x10   2x10 w/ ER  2x10 w ER     Serratus push ups  Serratus push ups 2x10 24"   Planks 20"x3 Elevated plank 40" at alter G  40" at table     Chin tucks  Prone 10   With rotation 10        DNF endruance          Scap stab  YMB 2x  RMB ABC 2x YMB  ABC 2x     Wall clocks          Band pull aparts neutral & B/L diagonal     Alternating    10x ea GTB     Therapeutic Activities         Education          OH Carries    2 laps 10# 90/90 10# KB 3 laps  OH `10# KB 2 laps    Modalities

## 2023-08-18 ENCOUNTER — EVALUATION (OUTPATIENT)
Dept: PHYSICAL THERAPY | Facility: CLINIC | Age: 48
End: 2023-08-18
Payer: COMMERCIAL

## 2023-08-18 DIAGNOSIS — M54.9 MID BACK PAIN: ICD-10-CM

## 2023-08-18 DIAGNOSIS — M25.512 CHRONIC LEFT SHOULDER PAIN: Primary | ICD-10-CM

## 2023-08-18 DIAGNOSIS — G89.29 CHRONIC LEFT SHOULDER PAIN: Primary | ICD-10-CM

## 2023-08-18 PROCEDURE — 97140 MANUAL THERAPY 1/> REGIONS: CPT

## 2023-08-18 PROCEDURE — 97110 THERAPEUTIC EXERCISES: CPT

## 2023-08-18 PROCEDURE — 97112 NEUROMUSCULAR REEDUCATION: CPT

## 2023-08-18 NOTE — PROGRESS NOTES
PT Re-Evaluation     Today's date: 2023  Patient name: Xena Allen  : 1975  MRN: [id number]  Referring provider: Claudell Fort, *  Dx:   Encounter Diagnosis     ICD-10-CM    1. Chronic left shoulder pain  M25.512     G89.29       2. Mid back pain  M54.9                      Assessment  Assessment details: RE 23 Patient has made good progress with PT and has met functional PT goals at this time. Patient will be discharged from skilled PT services and will continue to make progress with I HEP. Patients HEP was reviewed in order to ensure compliance and success at home, in which exercise bands and printouts were given. We will be available if the patient needs physical therapy in the future. Patient was given an opportunity to ask questions. All questions were answered to patients satisfaction. RE: 7/3/23 Xena Allen is a pleasant 50 y.o, male who presents with improvement in shoulder impingement. He is able to lift his arm higher without pain, however still continues to have pain at end range. Patient notes impaired motor control at top which is contributing to his shoulder pain. He still has limitations with thoracic and neck mobility although it is improving. He has been complaint with PT and HEP at this point and will continue to benefit from skilled PT in order to address existing limitations and progress back to PLOF with minimal discomfort.      IE;Mario Marte is a pleasant 50 y.o. male presents with signs and symptoms consistent with:   Chronic left shoulder pain  Mid back pain    Problem List:  1) Hypomobility of shoulder capsule  2) Hypomobility of thoracic movement     Comparable signs:  1) Raising overhead   2) Rotating his arm    he has decreased shoulder and mid back mobility, which improved after capsular assessment, painful arc, and increased pain resulting in worry over not knowing what's wrong, fear of not being able to keep active and future ill health (and wanting to prevent it). His presenting symptoms are in line with impingement and rotator cuff tendinopathy. These impairments listed above are preventing the patient from participating in functional activity. No further referral appears necessary at this time based upon examination results, and is negative for any red flags. Prognosis is good given HEP compliance and attendance to physical therapy 2x a week. Positive prognostic indicators include positive attitude toward recovery. Negative prognostic indicators include chronicity of symptoms and hypertension. Patent will benefit from skilled physical therapy at this time to address deficits to improve overall function and return to PLOF. Patient verbalized understanding of POC, HEP, and return demonstrated HEP. All questions were answered to patients satisfaction. Please contact me if you have any questions or recommendations. Thank you for the referral and the opportunity to share in [facility]. Understanding of Dx/Px/POC: good   Prognosis: good    Goals  Impairment Goals 4-6 weeks MET  In order to improve and return to PLOF patient will be able to. ..  - Decrease pain frequency/intensity/duration to 2/10  - Demonstrate symmetrical shoulder ROM with non involved shoulder without compensations  - Increase shoulder strength to 5/5 throughout  - Increase scapular strength to 5/5 throughout      Functional Goals 6-8 weeks MET  In order to improve and return to PLOF will be able to. ..  - Participate in functional activities with no greater than 2/10 pain.   - Increase Functional Status Measure (FOTO) to: predicted outcomes  - Be independent and compliant with HEP  - Participate in functional activities with good motor control.  - Reach overhead without increased pain/compensation/difficulty  - Reach behind back without increased pain/compensation/difficulty   - Bench press pre injury weight (not met progressing)  - Participate in upper body workout without pain. Plan  Patient would benefit from: skilled PT  Planned modality interventions: cryotherapy and electrical stimulation/Russian stimulation  Planned therapy interventions: joint mobilization, manual therapy, neuromuscular re-education, patient education, strengthening, stretching, therapeutic activities, therapeutic exercise, home exercise program, functional ROM exercises and postural training  Treatment plan discussed with: patient        Subjective Evaluation    History of Present Illness  Mechanism of injury: RE 8/18/23 Patient presents to PT 85% improved with L shoulder and shoulder and mid back pain. He notes that is not restricting him from doing things. He notes the occasional hurt with some pain every now and then with tolerable pain. He notes function has improved. He notes it does not give out as much. Patient notes that he still does not trust it on flat bench. RE 7/3/23 Patient presents to PT today with 75% improvement with L shoulder and mid back pain. He notes that he gets the occasional hurt when sleeping on it/doing exercises which is not as often. He is not as inhibited with doing things as much. He notes that overall the neck and back is about 50-60% where it does feel always tight. IE:Patient presents to PT today with L shoulder and back pain for years. He notes that the L shoulder is hindering his ability to do things about January, after starting lifting weights . He lifts weights and notes that any kind of pushing it hurts, and feels that it is going to give out. Benching especially, and is not able to do anything heavy. He notes sleeping on the side is an issue. He notes that if he could just "crack" his back it feels better.  Lifts 3-5 days a week    Difficulty with: sleeping, raising arm overhead,   Patient Goals  Patient goals for therapy: decreased pain, independence with ADLs/IADLs and return to sport/leisure activities  Patient goal: sleep on side, work out the way he likes to, more range  (met range and sleep 75% with work out the way he likes to )  Pain  Pain scale: shoulder 0 back 2. At best pain ratin  At worst pain ratin (shoulder 5, back 4)  Location: front of the shoulder, back is mid back   Quality: dull ache, sharp and tight  Relieving factors: medications and heat  Aggravating factors: lifting  Progression: improved      Diagnostic Tests  X-ray: normal  Treatments  No previous or current treatments        Objective     Concurrent Complaints  Positive for night pain and disturbed sleep. Negative for headaches, nausea/motion sickness, trouble swallowing, difficulty breathing and shortness of breath    Cervical/Thoracic Screen   Cervical range of motion within normal limits with the following exceptions: Cervical motion 100%  Thoracic range of motion within normal limits with the following exceptions: Active Range of Motion   Cervical/Thoracic Spine       Cervical    Subcranial retraction:  WFL   Flexion:  WFL  Extension:  WFL  Left lateral flexion: Neck active lateral bend left: slight tightness noted. WFL  Right lateral flexion: Neck active lateral bend right: slight tightness noted. WFL  Left rotation: Neck active rotation left: slight tightness noted. WFL  Right rotation: Neck active rotation right: slight tightness noted.     [facility]    Thoracic    Flexion:  WFL  Extension:  Restriction level: minimal  Left lateral flexion:  WFL  Right lateral flexion:  WFL  Left rotation:  Restriction level: minimal  Right rotation:  Restriction level: minimal  Left Shoulder   Flexion: 170 degrees   Abduction: 160 degrees with pain  External rotation BTH: T6   Internal rotation BTB: T10 with pain    Right Shoulder   Normal active range of motion    Passive Range of Motion   Left Shoulder   Flexion: 160 (ERP) degrees with pain  Abduction: 160 (ERP) degrees with pain    Right Shoulder   Normal passive range of motion    Joint Play   Left Shoulder  Hypomobile in the anterior capsule, posterior capsule and inferior capsule. Additional Joint Play Details  ROM and pain improved after capsular assessment    Strength/Myotome Testing     Right Shoulder     Planes of Motion   Flexion: 4+   Abduction: 4+   External rotation at 0°: 4+   External rotation at 90°: 4   Internal rotation at 0°: 4+   Internal rotation at 90°: 4+     Isolated Muscles   Serratus anterior: 5     Tests     Left Shoulder   Positive belly press (pain), empty can, Hawkin's and painful arc. Negative drop arm, external rotation lag sign, full can, internal rotation lag sign and ULTT2.      General Comments:    Upper quarter screen   Elbow: unremarkable  Hand/wrist: unremarkable  Neuro Exam:     Headaches   Patient reports headaches: No.           Diagnosis:  L shoulder impingement/tendinopathy   Precautions:  HTN, T2DM   Comparable signs 1) Lifting over head   2) Rotating his arm   Primary Impairments: 1) Limited shoulder ROM  2) Limited thoracic extension   Patient Goals Return to working out    Manual Therapy 6/30 6/6 6/12 6/16 6/19 6/26   PA mobs thoracic SP thoracic  SP GIV SP  SP thoracic SP thoracic  SP thoracic    GV thoracic  SP   SP consent SP  SP SP    PA mobs shoulder SP GII-GIV SP GII-GIV SP GII-GIV  SP GII-GIV SP GII-GIV  SP GII-GIV                      Exercise Diary          Therapeutic Exercise         Pullies/UBE  UBE 2/2 for blood flow and mobility UBE 2/2 for BF and mobility  UBE 2/2 for BF and mobility  UBE 2/2 for BF and mobility UBE 2/2 for BF and mobility    Sleeper stretch  30"x3 30"x3  30"x3    Cervical snags    10x10"       Self mobs 20x abd/flx 20x 20x abd   20x abd/flx     Open book  10x10"       Thoracic ext  10x10"  10x10" FR  10x10" FR  10x10" FR     Rhomboid stretch   10x10"  10x10"      Pec stretc  30"x3       Wall rolls Walks GTB LT lift off  20x   20x with LT lift off 20x with LT lift off 20x w/ LT lift off    Row to ER  2x10 BTB        Bench press 2x10 25# Neuromuscular Re-education         Lower trap pull downs     17# 2x12 20# 2x12 25# 2x12    TRX rows  2x12 w/ chin tuck   2x12 2x12 2x12 w/ chin tuck    Serratus push ups 2x12     20x 2x12   Chin tucks       2x12 standing    DNF endruance       2x12 5"    Scap stab RMB 2x                          Therapeutic Activities         Education   HEP prior to lifting     Scapular setting, and chin tucks   OH Carries 2 laps 10#        Modalities

## 2023-08-21 ENCOUNTER — APPOINTMENT (OUTPATIENT)
Dept: LAB | Facility: CLINIC | Age: 48
End: 2023-08-21
Payer: COMMERCIAL

## 2023-08-21 DIAGNOSIS — I10 ESSENTIAL HYPERTENSION: ICD-10-CM

## 2023-08-21 DIAGNOSIS — E55.9 VITAMIN D DEFICIENCY: ICD-10-CM

## 2023-08-21 DIAGNOSIS — R79.89 ABNORMAL CBC: ICD-10-CM

## 2023-08-21 DIAGNOSIS — E11.65 TYPE 2 DIABETES MELLITUS WITH HYPERGLYCEMIA, WITHOUT LONG-TERM CURRENT USE OF INSULIN (HCC): ICD-10-CM

## 2023-08-21 DIAGNOSIS — E66.01 MORBID OBESITY (HCC): ICD-10-CM

## 2023-08-21 DIAGNOSIS — E78.1 HYPERTRIGLYCERIDEMIA: ICD-10-CM

## 2023-08-21 LAB
25(OH)D3 SERPL-MCNC: 22.2 NG/ML (ref 30–100)
ALBUMIN SERPL BCP-MCNC: 4.7 G/DL (ref 3.5–5)
ALP SERPL-CCNC: 63 U/L (ref 34–104)
ALT SERPL W P-5'-P-CCNC: 35 U/L (ref 7–52)
ANION GAP SERPL CALCULATED.3IONS-SCNC: 8 MMOL/L
AST SERPL W P-5'-P-CCNC: 25 U/L (ref 13–39)
BASOPHILS # BLD AUTO: 0.07 THOUSANDS/ÂΜL (ref 0–0.1)
BASOPHILS NFR BLD AUTO: 1 % (ref 0–1)
BILIRUB SERPL-MCNC: 0.75 MG/DL (ref 0.2–1)
BUN SERPL-MCNC: 21 MG/DL (ref 5–25)
CALCIUM SERPL-MCNC: 9.9 MG/DL (ref 8.4–10.2)
CHLORIDE SERPL-SCNC: 101 MMOL/L (ref 96–108)
CHOLEST SERPL-MCNC: 155 MG/DL
CO2 SERPL-SCNC: 27 MMOL/L (ref 21–32)
CREAT SERPL-MCNC: 0.86 MG/DL (ref 0.6–1.3)
EOSINOPHIL # BLD AUTO: 0.22 THOUSAND/ÂΜL (ref 0–0.61)
EOSINOPHIL NFR BLD AUTO: 2 % (ref 0–6)
ERYTHROCYTE [DISTWIDTH] IN BLOOD BY AUTOMATED COUNT: 13.9 % (ref 11.6–15.1)
EST. AVERAGE GLUCOSE BLD GHB EST-MCNC: 177 MG/DL
GFR SERPL CREATININE-BSD FRML MDRD: 101 ML/MIN/1.73SQ M
GLUCOSE P FAST SERPL-MCNC: 162 MG/DL (ref 65–99)
HBA1C MFR BLD: 7.8 %
HCT VFR BLD AUTO: 49 % (ref 36.5–49.3)
HDLC SERPL-MCNC: 39 MG/DL
HGB BLD-MCNC: 16.5 G/DL (ref 12–17)
IMM GRANULOCYTES # BLD AUTO: 0.03 THOUSAND/UL (ref 0–0.2)
IMM GRANULOCYTES NFR BLD AUTO: 0 % (ref 0–2)
LDLC SERPL CALC-MCNC: 64 MG/DL (ref 0–100)
LYMPHOCYTES # BLD AUTO: 3.89 THOUSANDS/ÂΜL (ref 0.6–4.47)
LYMPHOCYTES NFR BLD AUTO: 40 % (ref 14–44)
MCH RBC QN AUTO: 28.1 PG (ref 26.8–34.3)
MCHC RBC AUTO-ENTMCNC: 33.7 G/DL (ref 31.4–37.4)
MCV RBC AUTO: 84 FL (ref 82–98)
MONOCYTES # BLD AUTO: 0.73 THOUSAND/ÂΜL (ref 0.17–1.22)
MONOCYTES NFR BLD AUTO: 7 % (ref 4–12)
NEUTROPHILS # BLD AUTO: 4.86 THOUSANDS/ÂΜL (ref 1.85–7.62)
NEUTS SEG NFR BLD AUTO: 50 % (ref 43–75)
NRBC BLD AUTO-RTO: 0 /100 WBCS
PLATELET # BLD AUTO: 265 THOUSANDS/UL (ref 149–390)
PMV BLD AUTO: 9.4 FL (ref 8.9–12.7)
POTASSIUM SERPL-SCNC: 4.4 MMOL/L (ref 3.5–5.3)
PROT SERPL-MCNC: 7.3 G/DL (ref 6.4–8.4)
RBC # BLD AUTO: 5.87 MILLION/UL (ref 3.88–5.62)
SODIUM SERPL-SCNC: 136 MMOL/L (ref 135–147)
TRIGL SERPL-MCNC: 262 MG/DL
WBC # BLD AUTO: 9.8 THOUSAND/UL (ref 4.31–10.16)

## 2023-08-21 PROCEDURE — 80053 COMPREHEN METABOLIC PANEL: CPT

## 2023-08-21 PROCEDURE — 36415 COLL VENOUS BLD VENIPUNCTURE: CPT

## 2023-08-21 PROCEDURE — 85025 COMPLETE CBC W/AUTO DIFF WBC: CPT

## 2023-08-21 PROCEDURE — 83036 HEMOGLOBIN GLYCOSYLATED A1C: CPT

## 2023-08-21 PROCEDURE — 80061 LIPID PANEL: CPT

## 2023-08-21 PROCEDURE — 82306 VITAMIN D 25 HYDROXY: CPT

## 2023-08-22 ENCOUNTER — EVALUATION (OUTPATIENT)
Dept: PHYSICAL THERAPY | Facility: CLINIC | Age: 48
End: 2023-08-22
Payer: COMMERCIAL

## 2023-08-22 DIAGNOSIS — G89.29 CHRONIC PAIN OF BOTH KNEES: Primary | ICD-10-CM

## 2023-08-22 DIAGNOSIS — M25.561 CHRONIC PAIN OF BOTH KNEES: Primary | ICD-10-CM

## 2023-08-22 DIAGNOSIS — M25.562 CHRONIC PAIN OF BOTH KNEES: Primary | ICD-10-CM

## 2023-08-22 PROCEDURE — 97530 THERAPEUTIC ACTIVITIES: CPT

## 2023-08-22 PROCEDURE — 97162 PT EVAL MOD COMPLEX 30 MIN: CPT

## 2023-08-22 NOTE — PROGRESS NOTES
PT Evaluation     Today's date: 2023  Patient name: Shayla Flatwoods  : 1975  MRN: [id number]  Referring provider: Sean Terry, *  Dx:   Encounter Diagnosis     ICD-10-CM    1. Chronic pain of both knees  M25.561     M25.562     G89.29                      Assessment  Assessment details: Shayla Sosa is a pleasant 50 y.o. male presents with signs and symptoms consistent with:   Chronic pain of both knees  (primary encounter diagnosis)    Problem List:  1) Neural tension  2) Impaired him strength    Comparable signs:  1) Step ups  2) Squats    he has decreased hip strength, increased neural tension and difficulty with functional tasks resulting in worry over not knowing what's wrong and fear of not being able to keep active. These impairments listed above are preventing the patient from participating in functional activity. No further referral appears necessary at this time based upon examination results, and is negative for any red flags. Prognosis is good based off HEP compliance and attendance to physical therapy 2x a week. Positive prognostic indicators include positive attitude toward recovery. Negative prognostic indicators include chronicity of symptoms and obesity. Patent will benefit from skilled physical therapy at this time to address deficits to improve overall function and return to PLOF. Patient verbalized understanding of POC, HEP, and return demonstrated HEP. All questions were answered to patients satisfaction. Please contact me if you have any questions or recommendations. Thank you for the referral and the opportunity to share in [address] [place] [address].               Impairments: abnormal coordination, abnormal gait, abnormal muscle firing, abnormal or restricted ROM, abnormal movement, activity intolerance, impaired balance, impaired physical strength, lacks appropriate home exercise program, pain with function, safety issue, weight-bearing intolerance and poor body mechanics  Understanding of Dx/Px/POC: good   Prognosis: good    Goals  Impairment Goals 4-6 weeks   In order to maximize function patient will be able to. ..   - Decrease intensity/duration/frequency of pain to 2/10  - Demonstrate symmetrical knee AROM without pain  - Increase knee strength to 5/5 throughout  - Increase hip strength to 5/5 throughout    Functional Goals 6-8 weeks  In order to return to prior level of function patient will be able to. ..   - Participate in ADL's/IADL's/sport specific activities with no greater than 2/10 pain. - Increase Functional Status Measure (FOTO) to: predicted outcomes  - Be independent and compliant with HEP  - Perform end range squat with good eccentric control without increased pain/compensation/difficulty  - Demonstrate functional activities with good core and glute strength without compensation/pain/difficulty   - Perform sit to stand without increased pain/compensation/difficulty   - Ascend and descend stairs without increased pain/compensation/difficulty       Plan  Patient would benefit from: skilled PT  Planned modality interventions: cryotherapy, electrical stimulation/Russian stimulation, TENS and thermotherapy: hydrocollator packs  Planned therapy interventions: joint mobilization, manual therapy, neuromuscular re-education, patient education, strengthening, stretching, therapeutic activities, therapeutic exercise, home exercise program, functional ROM exercises, Wheeler taping, postural training, gait training, balance, balance/weight bearing training, flexibility, graded exercise and motor coordination training  Frequency: 2x week  Duration in weeks: 8  Treatment plan discussed with: patient        Subjective Evaluation    History of Present Illness  Mechanism of injury: Patient reports that he has bilateral knee pain. He had a history of a torn meniscus 15 years ago playing basketball (L side). 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  He notes that he does get occasional clicking. He has had imaging within the last two years that showed mild arthritis. He does not work out his legs at [employer]. L>R. Difficulty: long distances, kneeling, anything fast paced, stairs going down more painful, standing for a while   Patient Goals  Patient goals for therapy: decreased pain, independence with ADLs/IADLs and return to sport/leisure activities  Patient goal: endurance walk more then 3 miles, and manage pain, full court basketball, kneeling   Pain  Pain scale: L: 3 R:1.  Pain scale at highest: 8 (mostly left sometimes right)  Location: posterior leg/ front   Quality: pressure  Relieving factors: ice and relaxation (if it gets real bad)  Aggravating factors: stair climbing, walking, standing and running  Symptom course: better then they were a year ago. Diagnostic Tests  X-ray: normal (arthritis )  Treatments  Previous treatment: physical therapy (many years ago)        Objective     Active Range of Motion   Left Knee   Normal active range of motion    Right Knee   Normal active range of motion    Mobility   Tibiofemoral Mobility:   Left knee Hypomobile in the posterior tibiofemoral tendon(s). Right knee Hypomobile in the posterior tibiofemoral tendon(s). Strength/Myotome Testing     Left Knee   Flexion: 4  Prone flexion: 4  Extension: 4+    Right Knee   Flexion: 4  Prone flexion: 4  Extension: 4+    Additional Strength Details  Hip strength: R and L  4/5 globally  4-5/ glute med    (+) slump, improve with extension      Tests     Left Knee   Negative anterior drawer, lateral Airam, medial Airam and posterior drawer. Right Knee   Negative anterior drawer, lateral Airam, medial Airam and posterior drawer. General Comments:      Knee Comments  Step ups: Vaulting noted on bilateral ( increased knee pain)   Step downs:  Anterior glide of tibia (increase knee pain)  Squatting: increased anterior knee pain, external rotation of feet    Centralized with extension, no change with flexion              Diagnosis:  Bilateral knee pain   Precautions:  T2DM   Comparable signs 1) Step ups  2) Squats   Primary Impairments: 1) Imparied hip strenght  2) neural tenison   Patient Goals Work out    Manual Therapy 8/22        MWM          Post tib mobs                                    Exercise Diary          Therapeutic Exercise         Bike                                                               Neuromuscular Re-education         Quad sets                                                                        Therapeutic Activities         Education POC moving forward                  Modalities

## 2023-08-24 ENCOUNTER — OFFICE VISIT (OUTPATIENT)
Dept: FAMILY MEDICINE CLINIC | Facility: CLINIC | Age: 48
End: 2023-08-24
Payer: COMMERCIAL

## 2023-08-24 VITALS
RESPIRATION RATE: 16 BRPM | WEIGHT: 305 LBS | BODY MASS INDEX: 42.7 KG/M2 | SYSTOLIC BLOOD PRESSURE: 124 MMHG | DIASTOLIC BLOOD PRESSURE: 82 MMHG | HEIGHT: 71 IN | HEART RATE: 80 BPM | OXYGEN SATURATION: 97 %

## 2023-08-24 DIAGNOSIS — I10 ESSENTIAL HYPERTENSION: ICD-10-CM

## 2023-08-24 DIAGNOSIS — E11.65 TYPE 2 DIABETES MELLITUS WITH HYPERGLYCEMIA, WITHOUT LONG-TERM CURRENT USE OF INSULIN (HCC): Primary | ICD-10-CM

## 2023-08-24 DIAGNOSIS — E78.2 MIXED HYPERLIPIDEMIA: ICD-10-CM

## 2023-08-24 DIAGNOSIS — E55.9 VITAMIN D DEFICIENCY: ICD-10-CM

## 2023-08-24 DIAGNOSIS — E66.01 MORBID OBESITY (HCC): ICD-10-CM

## 2023-08-24 PROBLEM — D72.829 LEUKOCYTOSIS: Status: RESOLVED | Noted: 2020-01-28 | Resolved: 2023-08-24

## 2023-08-24 PROBLEM — E87.1 HYPONATREMIA: Status: RESOLVED | Noted: 2023-05-24 | Resolved: 2023-08-24

## 2023-08-24 PROBLEM — R53.83 FATIGUE: Status: RESOLVED | Noted: 2019-12-10 | Resolved: 2023-08-24

## 2023-08-24 PROBLEM — R74.01 ELEVATED ALT MEASUREMENT: Status: RESOLVED | Noted: 2022-05-11 | Resolved: 2023-08-24

## 2023-08-24 PROCEDURE — 99214 OFFICE O/P EST MOD 30 MIN: CPT | Performed by: NURSE PRACTITIONER

## 2023-08-24 RX ORDER — LISINOPRIL 30 MG/1
30 TABLET ORAL DAILY
Qty: 90 TABLET | Refills: 1 | Status: SHIPPED | OUTPATIENT
Start: 2023-08-24

## 2023-08-24 RX ORDER — ROSUVASTATIN CALCIUM 5 MG/1
5 TABLET, COATED ORAL DAILY
Qty: 90 TABLET | Refills: 1 | Status: SHIPPED | OUTPATIENT
Start: 2023-08-24

## 2023-08-24 RX ORDER — NIACIN 500 MG/1
500 TABLET, EXTENDED RELEASE ORAL
Qty: 90 TABLET | Refills: 1 | Status: SHIPPED | OUTPATIENT
Start: 2023-08-24

## 2023-08-24 NOTE — PROGRESS NOTES
Name: Steph Berrios      : 1975      MRN: [id number]  Encounter Provider: LARS Garcia  Encounter Date: 2023   Encounter department: [address] [place] [address]     1. Type 2 diabetes mellitus with hyperglycemia, without long-term current use of insulin (HCC)  -     Albumin / creatinine urine ratio; Future; Expected date: 2023  -     sitaGLIPtin (JANUVIA) 100 mg tablet; Take 1 tablet (100 mg total) by mouth daily  -     metFORMIN (GLUCOPHAGE) 1000 MG tablet; Take 1 tablet (1,000 mg total) by mouth 2 (two) times a day with meals  -     dapagliflozin (Farxiga) 10 MG tablet; Take 1 tablet (10 mg total) by mouth in the morning  -     Comprehensive metabolic panel; Future; Expected date: 2023  -     CBC and differential; Future; Expected date: 2023  -     Lipid Panel with Direct LDL reflex; Future; Expected date: 2023  -     HEMOGLOBIN A1C W/ EAG ESTIMATION; Future; Expected date: 2023    Hemoglobin A1c is 7.8   Continue metformin 1,000mg BID. Continue farxiga 10mg daily. Januvia 100mg daily prescribed. Medication information and side effects reviewed. Repeat hemoglobin A1c ordered. Patient instructed to eat a healthy low fat/diabetic diet. 2. Essential hypertension  -     lisinopril (ZESTRIL) 30 mg tablet; Take 1 tablet (30 mg total) by mouth daily  -     Comprehensive metabolic panel; Future; Expected date: 2023  -     CBC and differential; Future; Expected date: 2023  -     Lipid Panel with Direct LDL reflex; Future; Expected date: 2023    /82. Continue lisinopril 30mg daily. 3. Mixed hyperlipidemia  -     niacin (NIASPAN) 500 mg CR tablet; Take 1 tablet (500 mg total) by mouth daily at bedtime  -     rosuvastatin (CRESTOR) 5 mg tablet; Take 1 tablet (5 mg total) by mouth daily  -     Comprehensive metabolic panel;  Future; Expected date: 2023  -     CBC and differential; Future; Expected date: 11/21/2023  -     Lipid Panel with Direct LDL reflex; Future; Expected date: 11/21/2023  -     HEMOGLOBIN A1C W/ EAG ESTIMATION; Future; Expected date: 11/21/2023    Total cholesterol 155, Triglycerides 262, LDL 64. Continue crestor 5mg daily. D/c fenofibrate. Niacin prescribed. Medication information and side effects reviewed. Repeat lipid panel in 3 months. Patient instructed to eat a healthy low fat/diabetic diet. 4. Morbid obesity ([address])  -     Comprehensive metabolic panel; Future; Expected date: 11/21/2023  -     CBC and differential; Future; Expected date: 11/21/2023  -     Lipid Panel with Direct LDL reflex; Future; Expected date: 11/21/2023  -     HEMOGLOBIN A1C W/ EAG ESTIMATION; Future; Expected date: 11/21/2023    Patient instructed to eat a healthy low fat/diabetic diet. 5. Vitamin D deficiency  -     Vitamin D 25 hydroxy; Future; Expected date: 11/21/2023    Vitamin D is 22.2   Patient instructed to take 2,000 units of vitamin D OTC daily. Reviewed lab results with the patient. Patient instructed to follow-up in 3 months or sooner prn. Depression Screening and Follow-up Plan: Patient was screened for depression during today's encounter. They screened negative with a PHQ-2 score of 0. Subjective      Patient is here for a follow-up for chronic medical conditions. Patient is here for a follow-up for type 2 DM. Patient reports that he watches his diet. Patient reports that he takes metformin and farixga as prescribed. Denies any Has, dizziness, nausea, or vomiting. Patient is here for a follow-up for HTN. Patient reports that he takes lisinopril 30mg daily. Denies any chest pain or SOB. Patient is here for a follow-up for obesity and hyperlipidemia. Patient reports that he does strength training 3 days a week. Patient reports that he watches his diet. Patient takes crestor 5mg daily and fenofibrate for hyperlipidemia.    Patient takes a multivitamin for vitamin D deficiency. Review of Systems   Constitutional: Negative for chills and fever. HENT: Negative for congestion, ear pain, sinus pressure, sore throat and trouble swallowing. Respiratory: Negative for cough, chest tightness, shortness of breath and wheezing. Cardiovascular: Negative for chest pain, palpitations and leg swelling. Gastrointestinal: Negative for abdominal pain, blood in stool, diarrhea, nausea and vomiting. Genitourinary: Negative for dysuria, frequency and hematuria. Skin: Negative for rash. Neurological: Negative for dizziness, seizures, syncope, light-headedness and headaches. Current Outpatient Medications on File Prior to Visit   Medication Sig   • Multiple Vitamin (Multivitamin Adult) TABS Take 1 tablet by mouth daily   • Omega-3 Fatty Acids (fish oil) 1,000 mg Take 2 capsules (2,000 mg total) by mouth daily   • TREMFYA subcutaneous injection Inject 100 mg under the skin every 56 days    • [DISCONTINUED] dapagliflozin (Farxiga) 10 MG tablet Take 1 tablet (10 mg total) by mouth in the morning   • [DISCONTINUED] fenofibrate (TRICOR) 54 MG tablet Take 1 tablet (54 mg total) by mouth daily   • [DISCONTINUED] lisinopril (ZESTRIL) 30 mg tablet Take 1 tablet (30 mg total) by mouth daily   • [DISCONTINUED] metFORMIN (GLUCOPHAGE) 1000 MG tablet Take 1 tablet (1,000 mg total) by mouth 2 (two) times a day with meals   • [DISCONTINUED] rosuvastatin (CRESTOR) 5 mg tablet Take 1 tablet (5 mg total) by mouth daily   • nabumetone (RELAFEN) 750 mg tablet Take 1 tablet (750 mg total) by mouth 2 (two) times a day       Objective     /82 (BP Location: Right arm, Patient Position: Sitting, Cuff Size: Large)   Pulse 80   Resp 16   Ht 5' 11" (1.803 m)   Wt (!) 138 kg (305 lb)   SpO2 97%   BMI 42.54 kg/m²     Physical Exam  Vitals reviewed. Constitutional:       General: He is not in acute distress. Appearance: He is obese. He is not ill-appearing or diaphoretic. HENT:      Right Ear: External ear normal.      Left Ear: External ear normal.      Nose: Nose normal.      Mouth/Throat:      Mouth: Mucous membranes are moist.      Pharynx: Oropharynx is clear. No oropharyngeal exudate or posterior oropharyngeal erythema. Eyes:      Conjunctiva/sclera: Conjunctivae normal.      Pupils: Pupils are equal, round, and reactive to light. Cardiovascular:      Rate and Rhythm: Normal rate and regular rhythm. Pulses: Normal pulses. Heart sounds: Normal heart sounds. Comments: No edema noted. Pulmonary:      Effort: Pulmonary effort is normal. No respiratory distress. Breath sounds: Normal breath sounds. No wheezing. Musculoskeletal:      Comments: Gait wnl. Skin:     Findings: No rash. Neurological:      Mental Status: He is alert and oriented to person, place, and time.    Psychiatric:         Mood and Affect: Mood normal.       LARS Butler

## 2023-08-25 ENCOUNTER — OFFICE VISIT (OUTPATIENT)
Dept: PHYSICAL THERAPY | Facility: CLINIC | Age: 48
End: 2023-08-25
Payer: COMMERCIAL

## 2023-08-25 DIAGNOSIS — M25.561 CHRONIC PAIN OF BOTH KNEES: Primary | ICD-10-CM

## 2023-08-25 DIAGNOSIS — M25.562 CHRONIC PAIN OF BOTH KNEES: Primary | ICD-10-CM

## 2023-08-25 DIAGNOSIS — G89.29 CHRONIC PAIN OF BOTH KNEES: Primary | ICD-10-CM

## 2023-08-25 PROCEDURE — 97530 THERAPEUTIC ACTIVITIES: CPT

## 2023-08-25 PROCEDURE — 97112 NEUROMUSCULAR REEDUCATION: CPT

## 2023-08-25 PROCEDURE — 97110 THERAPEUTIC EXERCISES: CPT

## 2023-08-25 PROCEDURE — 97140 MANUAL THERAPY 1/> REGIONS: CPT

## 2023-08-25 NOTE — PROGRESS NOTES
Daily Note     Today's date: 2023  Patient name: Daiana Ya  : 1975  MRN: [id number]  Referring provider: Nolvia Chao, *  Dx:   Encounter Diagnosis     ICD-10-CM    1. Chronic pain of both knees  M25.561     M25.562     G89.29       2. Chronic left shoulder pain  M25.512     G89.29       3. Mid back pain  M54.9                      Subjective: Patient notes his knees are feel okay. He notes that shooting a basketball and sharp and sudden motion is challenging       Objective: See treatment diary below      Assessment: Tolerated treatment well. Patient introduced to quad activation today and more challenged with right side then left. He needed verbal cues for good quad engagement without hip engagement. Stairs and squats improved after session today. Patient demonstrated fatigue post treatment, exhibited good technique with therapeutic exercises and would benefit from continued PT      Plan: Continue per plan of care. Progress treatment as tolerated.        Diagnosis:  Bilateral knee pain   Precautions:  T2DM   Comparable signs 1) Step ups  2) Squats   Primary Impairments: 1) Imparied hip strenght  2) neural tenison   Patient Goals Work out    Manual Therapy        MWM   10x james        Post tib mobs  SP GII-GIV                                   Exercise Diary          Therapeutic Exercise         Bike  5'       Heel raise  Leg press 2x12 50#       Gas/Sol st  10x10"                                           Neuromuscular Re-education         Quad sets  5"x20 james        SLR   2x10 james        Wall squats   30"x3                                                    Therapeutic Activities         Education POC moving forward  Quad activation                 Modalities

## 2023-08-29 ENCOUNTER — APPOINTMENT (OUTPATIENT)
Dept: PHYSICAL THERAPY | Facility: CLINIC | Age: 48
End: 2023-08-29
Payer: COMMERCIAL

## 2023-09-01 ENCOUNTER — OFFICE VISIT (OUTPATIENT)
Dept: PHYSICAL THERAPY | Facility: CLINIC | Age: 48
End: 2023-09-01
Payer: COMMERCIAL

## 2023-09-01 DIAGNOSIS — M25.562 CHRONIC PAIN OF BOTH KNEES: Primary | ICD-10-CM

## 2023-09-01 DIAGNOSIS — M25.561 CHRONIC PAIN OF BOTH KNEES: Primary | ICD-10-CM

## 2023-09-01 DIAGNOSIS — G89.29 CHRONIC PAIN OF BOTH KNEES: Primary | ICD-10-CM

## 2023-09-01 PROCEDURE — 97110 THERAPEUTIC EXERCISES: CPT

## 2023-09-01 PROCEDURE — 97112 NEUROMUSCULAR REEDUCATION: CPT

## 2023-09-01 PROCEDURE — 97140 MANUAL THERAPY 1/> REGIONS: CPT

## 2023-09-01 NOTE — PROGRESS NOTES
Daily Note     Today's date: 2023  Patient name: Lala Robison  : 1975  MRN: [id number]  Referring provider: Arpit Lugo, *  Dx:   Encounter Diagnosis     ICD-10-CM    1. Chronic pain of both knees  M25.561     M25.562     G89.29           Start Time:   Stop Time: 08  Total time in clinic (min): 47 minutes    Subjective: Patient reports having a significant increase in L knee pain that "hasn't been this bad in years." Patient notes that pain is worse in the back of his knee, but that he also has pain in the front of his knee. Patient does not recall of anything that would have caused an onset of the recent flare up. Objective: See treatment diary below      Assessment: Tolerated treatment well. Patient only experienced an onset of symptoms during today's session when changing from a sitting to a standing position and when flexing his knee far enough for a deep squat. Patient demonstrated proper form and had a good recall/understanding of all exercises in his program. Patient displayed the most relief from manual interventions today and had minimal improvements in L knee symptoms at the concusion of today's session. Patient demonstrated fatigue post treatment, exhibited good technique with therapeutic exercises and would benefit from continued PT      Plan: Continue per plan of care. Progress treatment as tolerated.        Diagnosis:  Bilateral knee pain   Precautions:  T2DM   Comparable signs 1) Step ups  2) Squats   Primary Impairments: 1) Imparied hip strenght  2) neural tenison   Patient Goals Work out    Manual Therapy  9/      MWM   10x james  10x james      Post tib mobs  SP GII-GIV  SP GII-GIV                                 Exercise Diary          Therapeutic Exercise         Bike  5' 5'      Heel raise  Leg press 2x12 50# Leg press 2x15 50#      Gas/Sol st  10x10" 10x10"      Seated HS st   10x10"                                 Neuromuscular Re-education         Quad sets  5"x20 james  5"x20 james      SLR   2x10 james  2x10 james      Wall squats   30"x3 30"x3                                                   Therapeutic Activities         Education POC moving forward  Quad activation                 Modalities                                         [phone]

## 2023-09-05 ENCOUNTER — APPOINTMENT (OUTPATIENT)
Dept: PHYSICAL THERAPY | Facility: CLINIC | Age: 48
End: 2023-09-05
Payer: COMMERCIAL

## 2023-09-08 ENCOUNTER — APPOINTMENT (OUTPATIENT)
Dept: PHYSICAL THERAPY | Facility: CLINIC | Age: 48
End: 2023-09-08
Payer: COMMERCIAL

## 2023-09-11 ENCOUNTER — APPOINTMENT (OUTPATIENT)
Dept: PHYSICAL THERAPY | Facility: CLINIC | Age: 48
End: 2023-09-11
Payer: COMMERCIAL

## 2023-09-15 ENCOUNTER — APPOINTMENT (OUTPATIENT)
Dept: PHYSICAL THERAPY | Facility: CLINIC | Age: 48
End: 2023-09-15
Payer: COMMERCIAL

## 2023-09-18 ENCOUNTER — OFFICE VISIT (OUTPATIENT)
Dept: PHYSICAL THERAPY | Facility: CLINIC | Age: 48
End: 2023-09-18
Payer: COMMERCIAL

## 2023-09-18 DIAGNOSIS — G89.29 CHRONIC PAIN OF BOTH KNEES: Primary | ICD-10-CM

## 2023-09-18 DIAGNOSIS — M25.561 CHRONIC PAIN OF BOTH KNEES: Primary | ICD-10-CM

## 2023-09-18 DIAGNOSIS — M25.562 CHRONIC PAIN OF BOTH KNEES: Primary | ICD-10-CM

## 2023-09-18 PROCEDURE — 97530 THERAPEUTIC ACTIVITIES: CPT

## 2023-09-18 PROCEDURE — 97140 MANUAL THERAPY 1/> REGIONS: CPT

## 2023-09-18 NOTE — PROGRESS NOTES
PT Re-Evaluation     Today's date: 2023  Patient name: Holli Pisano  : 1975  MRN: [id number]  Referring provider: John Estrada, *  Dx:   Encounter Diagnosis     ICD-10-CM    1. Chronic pain of both knees  M25.561     M25.562     G89.29                      Assessment  Assessment details: Patient has made good progress with PT and has met functional PT goals at this time. Patient will be discharged from skilled PT services and will continue to make progress with I HEP. Patients HEP was reviewed in order to ensure compliance and success at home, in which exercise bands and printouts were given. We will be available if the patient needs physical therapy in the future. Patient was given an opportunity to ask questions. All questions were answered to patients satisfaction. Chintan Palma is a pleasant 50 y.o. male presents with signs and symptoms consistent with:   Chronic pain of both knees  (primary encounter diagnosis)    Problem List:  1) Neural tension  2) Impaired him strength    Comparable signs:  1) Step ups  2) Squats    he has decreased hip strength, increased neural tension and difficulty with functional tasks resulting in worry over not knowing what's wrong and fear of not being able to keep active. These impairments listed above are preventing the patient from participating in functional activity. No further referral appears necessary at this time based upon examination results, and is negative for any red flags. Prognosis is good based off HEP compliance and attendance to physical therapy 2x a week. Positive prognostic indicators include positive attitude toward recovery. Negative prognostic indicators include chronicity of symptoms and obesity. Patent will benefit from skilled physical therapy at this time to address deficits to improve overall function and return to PLOF. Patient verbalized understanding of POC, HEP, and return demonstrated HEP.  All questions were answered to patients satisfaction. Please contact me if you have any questions or recommendations. Thank you for the referral and the opportunity to share in [id number]. Understanding of Dx/Px/POC: good   Prognosis: good    Goals  Impairment Goals 4-6 weeks Progressing 9/18/23  In order to maximize function patient will be able to. ..   - Decrease intensity/duration/frequency of pain to 2/10  - Demonstrate symmetrical knee AROM without pain  - Increase knee strength to 5/5 throughout  - Increase hip strength to 5/5 throughout    Functional Goals 6-8 weeks Progressing 9/18/23  In order to return to prior level of function patient will be able to. ..   - Participate in ADL's/IADL's/sport specific activities with no greater than 2/10 pain. - Increase Functional Status Measure (FOTO) to: predicted outcomes  - Be independent and compliant with HEP  - Perform end range squat with good eccentric control without increased pain/compensation/difficulty  - Demonstrate functional activities with good core and glute strength without compensation/pain/difficulty   - Perform sit to stand without increased pain/compensation/difficulty   - Ascend and descend stairs without increased pain/compensation/difficulty       Plan  Patient would benefit from: skilled PT  Planned modality interventions: thermotherapy: hydrocollator packs  Planned therapy interventions: home exercise program  Treatment plan discussed with: patient        Subjective Evaluation    History of Present Illness  Mechanism of injury: RE 9/18/3 Patient notes that his knee has been a bit more sore over the past couple of weeks. He notes it feels like he has a golf ball on the left side. The right one has been feeling okay, and does not seem as irritable. He notes that life has been a bit crazy lately and has had difficulty coming to appointments secondary to schedule. Patient at this time would like to put PT on hold for now.      IE:Patient reports that he has bilateral knee pain. He had a history of a torn meniscus 15 years ago playing basketball (L side). 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 He notes that he does get occasional clicking. He has had imaging within the last two years that showed mild arthritis. He does not work out his legs at [employer]. L>R. Difficulty: long distances, kneeling, anything fast paced, stairs going down more painful, standing for a while   Patient Goals  Patient goals for therapy: independence with ADLs/IADLs, return to sport/leisure activities and decreased pain  Patient goal: endurance walk more then 3 miles, and manage pain, full court basketball, kneeling  (progressing, on hold)  Pain  Pain scale: L: 5. Pain scale at highest: 7 (mostly left sometimes right)  Location: posterior leg/ front   Quality: pressure  Relieving factors: ice and relaxation (if it gets real bad)  Aggravating factors: stair climbing, walking, standing and running  Symptom course: better then they were a year ago. Diagnostic Tests  X-ray: normal (arthritis )  Treatments  Previous treatment: physical therapy (many years ago)        Objective     Active Range of Motion   Left Knee   Normal active range of motion    Right Knee   Normal active range of motion    Mobility   Tibiofemoral Mobility:   Left knee Hypomobile in the posterior tibiofemoral tendon(s). Right knee Hypomobile in the posterior tibiofemoral tendon(s). Strength/Myotome Testing     Left Knee   Flexion: 4  Prone flexion: 4  Extension: 4+    Right Knee   Flexion: 4  Prone flexion: 4  Extension: 4+    Additional Strength Details  Hip strength: R and L  4/5 globally  4-5/ glute med    (+) slump, improve with extension      Tests     Left Knee   Negative anterior drawer, lateral Airam, medial Airam and posterior drawer. Right Knee   Negative anterior drawer, lateral Airam, medial Airam and posterior drawer.      General Comments:      Knee Comments  Step ups: Vaulting noted on bilateral ( increased knee pain)   Step downs: Anterior glide of tibia (increase knee pain)  Squatting: increased anterior knee pain, external rotation of feet    Centralized with extension, no change with flexion              Diagnosis:  Bilateral knee pain   Precautions:  T2DM   Comparable signs 1) Step ups  2) Squats   Primary Impairments: 1) Imparied hip strenght  2) neural tenison   Patient Goals Work out    Manual Therapy 8/22 8/25 9/1 9/18     MWM   10x james  10x james      Post tib mobs  SP GII-GIV  SP GII-GIV                        Re-eval     SP      Exercise Diary          Therapeutic Exercise         Bike  5' 5'      Heel raise  Leg press 2x12 50# Leg press 2x15 50#      Gas/Sol st  10x10" 10x10"      Seated HS st   10x10"                                 Neuromuscular Re-education         Quad sets  5"x20 james  5"x20 james      SLR   2x10 james  2x10 james      Wall squats   30"x3 30"x3                                                   Therapeutic Activities         Education POC moving forward  Quad activation   PT hold for now.                Modalities

## 2023-09-22 ENCOUNTER — APPOINTMENT (OUTPATIENT)
Dept: PHYSICAL THERAPY | Facility: CLINIC | Age: 48
End: 2023-09-22
Payer: COMMERCIAL

## 2023-09-26 ENCOUNTER — APPOINTMENT (OUTPATIENT)
Dept: PHYSICAL THERAPY | Facility: CLINIC | Age: 48
End: 2023-09-26
Payer: COMMERCIAL

## 2023-09-29 ENCOUNTER — APPOINTMENT (OUTPATIENT)
Dept: PHYSICAL THERAPY | Facility: CLINIC | Age: 48
End: 2023-09-29
Payer: COMMERCIAL

## 2023-10-15 ENCOUNTER — VBI (OUTPATIENT)
Dept: ADMINISTRATIVE | Facility: OTHER | Age: 48
End: 2023-10-15

## 2023-10-24 DIAGNOSIS — I10 ESSENTIAL HYPERTENSION: ICD-10-CM

## 2023-10-24 RX ORDER — LISINOPRIL 30 MG/1
30 TABLET ORAL DAILY
Qty: 90 TABLET | Refills: 0 | Status: SHIPPED | OUTPATIENT
Start: 2023-10-24

## 2023-11-15 DIAGNOSIS — E11.65 TYPE 2 DIABETES MELLITUS WITH HYPERGLYCEMIA, WITHOUT LONG-TERM CURRENT USE OF INSULIN (HCC): ICD-10-CM

## 2023-11-15 DIAGNOSIS — E78.2 MIXED HYPERLIPIDEMIA: ICD-10-CM

## 2023-11-16 RX ORDER — NIACIN 500 MG/1
500 TABLET, EXTENDED RELEASE ORAL
Qty: 90 TABLET | Refills: 0 | Status: SHIPPED | OUTPATIENT
Start: 2023-11-16

## 2023-11-22 ENCOUNTER — APPOINTMENT (OUTPATIENT)
Dept: LAB | Facility: CLINIC | Age: 48
End: 2023-11-22
Payer: COMMERCIAL

## 2023-11-22 DIAGNOSIS — E55.9 VITAMIN D DEFICIENCY: ICD-10-CM

## 2023-11-22 DIAGNOSIS — I10 ESSENTIAL HYPERTENSION: ICD-10-CM

## 2023-11-22 DIAGNOSIS — E11.65 TYPE 2 DIABETES MELLITUS WITH HYPERGLYCEMIA, WITHOUT LONG-TERM CURRENT USE OF INSULIN (HCC): ICD-10-CM

## 2023-11-22 DIAGNOSIS — E78.2 MIXED HYPERLIPIDEMIA: ICD-10-CM

## 2023-11-22 DIAGNOSIS — E66.01 MORBID OBESITY (HCC): ICD-10-CM

## 2023-11-22 LAB
25(OH)D3 SERPL-MCNC: 18 NG/ML (ref 30–100)
ALBUMIN SERPL BCP-MCNC: 4.8 G/DL (ref 3.5–5)
ALP SERPL-CCNC: 68 U/L (ref 34–104)
ALT SERPL W P-5'-P-CCNC: 40 U/L (ref 7–52)
ANION GAP SERPL CALCULATED.3IONS-SCNC: 8 MMOL/L
AST SERPL W P-5'-P-CCNC: 26 U/L (ref 13–39)
BASOPHILS # BLD AUTO: 0.08 THOUSANDS/ÂΜL (ref 0–0.1)
BASOPHILS NFR BLD AUTO: 1 % (ref 0–1)
BILIRUB SERPL-MCNC: 0.6 MG/DL (ref 0.2–1)
BUN SERPL-MCNC: 19 MG/DL (ref 5–25)
CALCIUM SERPL-MCNC: 9.8 MG/DL (ref 8.4–10.2)
CHLORIDE SERPL-SCNC: 100 MMOL/L (ref 96–108)
CHOLEST SERPL-MCNC: 149 MG/DL
CO2 SERPL-SCNC: 25 MMOL/L (ref 21–32)
CREAT SERPL-MCNC: 0.83 MG/DL (ref 0.6–1.3)
EOSINOPHIL # BLD AUTO: 0.28 THOUSAND/ÂΜL (ref 0–0.61)
EOSINOPHIL NFR BLD AUTO: 3 % (ref 0–6)
ERYTHROCYTE [DISTWIDTH] IN BLOOD BY AUTOMATED COUNT: 14.4 % (ref 11.6–15.1)
EST. AVERAGE GLUCOSE BLD GHB EST-MCNC: 163 MG/DL
GFR SERPL CREATININE-BSD FRML MDRD: 102 ML/MIN/1.73SQ M
GLUCOSE P FAST SERPL-MCNC: 161 MG/DL (ref 65–99)
HBA1C MFR BLD: 7.3 %
HCT VFR BLD AUTO: 50.8 % (ref 36.5–49.3)
HDLC SERPL-MCNC: 42 MG/DL
HGB BLD-MCNC: 17 G/DL (ref 12–17)
IMM GRANULOCYTES # BLD AUTO: 0.03 THOUSAND/UL (ref 0–0.2)
IMM GRANULOCYTES NFR BLD AUTO: 0 % (ref 0–2)
LDLC SERPL CALC-MCNC: 61 MG/DL (ref 0–100)
LYMPHOCYTES # BLD AUTO: 4.02 THOUSANDS/ÂΜL (ref 0.6–4.47)
LYMPHOCYTES NFR BLD AUTO: 43 % (ref 14–44)
MCH RBC QN AUTO: 28.1 PG (ref 26.8–34.3)
MCHC RBC AUTO-ENTMCNC: 33.5 G/DL (ref 31.4–37.4)
MCV RBC AUTO: 84 FL (ref 82–98)
MONOCYTES # BLD AUTO: 0.76 THOUSAND/ÂΜL (ref 0.17–1.22)
MONOCYTES NFR BLD AUTO: 8 % (ref 4–12)
NEUTROPHILS # BLD AUTO: 4.26 THOUSANDS/ÂΜL (ref 1.85–7.62)
NEUTS SEG NFR BLD AUTO: 45 % (ref 43–75)
NRBC BLD AUTO-RTO: 0 /100 WBCS
PLATELET # BLD AUTO: 251 THOUSANDS/UL (ref 149–390)
PMV BLD AUTO: 9.3 FL (ref 8.9–12.7)
POTASSIUM SERPL-SCNC: 4.2 MMOL/L (ref 3.5–5.3)
PROT SERPL-MCNC: 7.7 G/DL (ref 6.4–8.4)
RBC # BLD AUTO: 6.05 MILLION/UL (ref 3.88–5.62)
SODIUM SERPL-SCNC: 133 MMOL/L (ref 135–147)
TRIGL SERPL-MCNC: 232 MG/DL
WBC # BLD AUTO: 9.43 THOUSAND/UL (ref 4.31–10.16)

## 2023-11-22 PROCEDURE — 80061 LIPID PANEL: CPT

## 2023-11-22 PROCEDURE — 85025 COMPLETE CBC W/AUTO DIFF WBC: CPT

## 2023-11-22 PROCEDURE — 83036 HEMOGLOBIN GLYCOSYLATED A1C: CPT

## 2023-11-22 PROCEDURE — 80053 COMPREHEN METABOLIC PANEL: CPT

## 2023-11-22 PROCEDURE — 82306 VITAMIN D 25 HYDROXY: CPT

## 2023-11-22 PROCEDURE — 36415 COLL VENOUS BLD VENIPUNCTURE: CPT

## 2023-11-30 ENCOUNTER — OFFICE VISIT (OUTPATIENT)
Dept: FAMILY MEDICINE CLINIC | Facility: CLINIC | Age: 48
End: 2023-11-30
Payer: COMMERCIAL

## 2023-11-30 ENCOUNTER — APPOINTMENT (OUTPATIENT)
Dept: LAB | Facility: CLINIC | Age: 48
End: 2023-11-30
Payer: COMMERCIAL

## 2023-11-30 VITALS
HEART RATE: 93 BPM | HEIGHT: 71 IN | RESPIRATION RATE: 16 BRPM | WEIGHT: 315 LBS | BODY MASS INDEX: 44.1 KG/M2 | SYSTOLIC BLOOD PRESSURE: 120 MMHG | OXYGEN SATURATION: 98 % | DIASTOLIC BLOOD PRESSURE: 70 MMHG

## 2023-11-30 DIAGNOSIS — I10 ESSENTIAL HYPERTENSION: ICD-10-CM

## 2023-11-30 DIAGNOSIS — E11.65 TYPE 2 DIABETES MELLITUS WITH HYPERGLYCEMIA, WITHOUT LONG-TERM CURRENT USE OF INSULIN (HCC): ICD-10-CM

## 2023-11-30 DIAGNOSIS — E66.01 MORBID OBESITY (HCC): ICD-10-CM

## 2023-11-30 DIAGNOSIS — Z12.5 SCREENING FOR PROSTATE CANCER: ICD-10-CM

## 2023-11-30 DIAGNOSIS — R79.89 ABNORMAL CBC: ICD-10-CM

## 2023-11-30 DIAGNOSIS — F41.9 ANXIETY: ICD-10-CM

## 2023-11-30 DIAGNOSIS — Z23 ENCOUNTER FOR IMMUNIZATION: ICD-10-CM

## 2023-11-30 DIAGNOSIS — Z00.00 ANNUAL PHYSICAL EXAM: Primary | ICD-10-CM

## 2023-11-30 DIAGNOSIS — E87.1 HYPONATREMIA: ICD-10-CM

## 2023-11-30 DIAGNOSIS — E55.9 VITAMIN D DEFICIENCY: ICD-10-CM

## 2023-11-30 DIAGNOSIS — E78.2 MIXED HYPERLIPIDEMIA: ICD-10-CM

## 2023-11-30 LAB
CREAT UR-MCNC: 89.1 MG/DL
MICROALBUMIN UR-MCNC: 25.8 MG/L
MICROALBUMIN/CREAT 24H UR: 29 MG/G CREATININE (ref 0–30)

## 2023-11-30 PROCEDURE — 82043 UR ALBUMIN QUANTITATIVE: CPT

## 2023-11-30 PROCEDURE — 90471 IMMUNIZATION ADMIN: CPT | Performed by: NURSE PRACTITIONER

## 2023-11-30 PROCEDURE — 99396 PREV VISIT EST AGE 40-64: CPT | Performed by: NURSE PRACTITIONER

## 2023-11-30 PROCEDURE — 99214 OFFICE O/P EST MOD 30 MIN: CPT | Performed by: NURSE PRACTITIONER

## 2023-11-30 PROCEDURE — 90677 PCV20 VACCINE IM: CPT | Performed by: NURSE PRACTITIONER

## 2023-11-30 PROCEDURE — 82570 ASSAY OF URINE CREATININE: CPT

## 2023-11-30 RX ORDER — NIACIN 750 MG/1
750 TABLET, EXTENDED RELEASE ORAL
Qty: 90 TABLET | Refills: 1 | Status: SHIPPED | OUTPATIENT
Start: 2023-11-30

## 2023-11-30 RX ORDER — ESCITALOPRAM OXALATE 5 MG/1
5 TABLET ORAL DAILY
Qty: 30 TABLET | Refills: 1 | Status: SHIPPED | OUTPATIENT
Start: 2023-11-30

## 2023-11-30 RX ORDER — ERGOCALCIFEROL 1.25 MG/1
50000 CAPSULE ORAL WEEKLY
Qty: 12 CAPSULE | Refills: 0 | Status: SHIPPED | OUTPATIENT
Start: 2023-11-30

## 2023-11-30 NOTE — PATIENT INSTRUCTIONS
Wellness Visit for Adults   AMBULATORY CARE:   A wellness visit  is when you see your healthcare provider to get screened for health problems. Your healthcare provider will also give you advice on how to stay healthy. Write down your questions so you remember to ask them. Ask your healthcare provider how often you should have a wellness visit. What happens at a wellness visit:  Your healthcare provider will ask about your health, and your family history of health problems. This includes high blood pressure, heart disease, and cancer. He or she will ask if you have symptoms that concern you, if you smoke, and about your mood. You may also be asked about your intake of medicines, supplements, food, and alcohol. Any of the following may be done: Your weight  will be checked. Your height may also be checked so your body mass index (BMI) can be calculated. Your BMI shows if you are at a healthy weight. Your blood pressure  and heart rate will be checked. Your temperature may also be checked. Blood and urine tests  may be done. Blood tests may be done to check your cholesterol levels. Abnormal cholesterol levels increase your risk for heart disease and stroke. You may also need a blood or urine test to check for diabetes if you are at increased risk. Urine tests may be done to look for signs of an infection or kidney disease. A physical exam  includes checking your heartbeat and lungs with a stethoscope. Your healthcare provider may also check your skin to look for sun damage. Screening tests  may be recommended. A screening test is done to check for diseases that may not cause symptoms. The screening tests you may need depend on your age, gender, family history, and lifestyle habits. For example, colorectal screening may be recommended if you are 48years old or older. Screening tests you need if you are a woman:   A Pap smear  is used to screen for cervical cancer.  Pap smears are usually done every 3 to 5 years depending on your age. You may need them more often if you have had abnormal Pap smear test results in the past. Ask your healthcare provider how often you should have a Pap smear. A mammogram  is an x-ray of your breasts to screen for breast cancer. Experts recommend mammograms every 2 years starting at age 48 years. You may need a mammogram at age 52 years or younger if you have an increased risk for breast cancer. Talk to your healthcare provider about when you should start having mammograms and how often you need them. Vaccines you may need:   Get an influenza vaccine  every year. The influenza vaccine protects you from the flu. Several types of viruses cause the flu. The viruses change over time, so new vaccines are made each year. Get a tetanus-diphtheria (Td) booster vaccine  every 10 years. This vaccine protects you against tetanus and diphtheria. Tetanus is a severe infection that may cause painful muscle spasms and lockjaw. Diphtheria is a severe bacterial infection that causes a thick covering in the back of your mouth and throat. Get a human papillomavirus (HPV) vaccine  if you are female and aged 23 to 32 or male 23 to 24 and never received it. This vaccine protects you from HPV infection. HPV is the most common infection spread by sexual contact. HPV may also cause vaginal, penile, and anal cancers. Get a pneumococcal vaccine  if you are aged 72 years or older. The pneumococcal vaccine is an injection given to protect you from pneumococcal disease. Pneumococcal disease is an infection caused by pneumococcal bacteria. The infection may cause pneumonia, meningitis, or an ear infection. Get a shingles vaccine  if you are 60 or older, even if you have had shingles before. The shingles vaccine is an injection to protect you from the varicella-zoster virus. This is the same virus that causes chickenpox.  Shingles is a painful rash that develops in people who had chickenpox or have been exposed to the virus. How to eat healthy:  My Plate is a model for planning healthy meals. It shows the types and amounts of foods that should go on your plate. Fruits and vegetables make up about half of your plate, and grains and protein make up the other half. A serving of dairy is included on the side of your plate. The amount of calories and serving sizes you need depends on your age, gender, weight, and height. Examples of healthy foods are listed below:  Eat a variety of vegetables  such as dark green, red, and orange vegetables. You can also include canned vegetables low in sodium (salt) and frozen vegetables without added butter or sauces. Eat a variety of fresh fruits , canned fruit in 100% juice, frozen fruit, and dried fruit. Include whole grains. At least half of the grains you eat should be whole grains. Examples include whole-wheat bread, wheat pasta, brown rice, and whole-grain cereals such as oatmeal.    Eat a variety of protein foods such as seafood (fish and shellfish), lean meat, and poultry without skin (turkey and chicken). Examples of lean meats include pork leg, shoulder, or tenderloin, and beef round, sirloin, tenderloin, and extra lean ground beef. Other protein foods include eggs and egg substitutes, beans, peas, soy products, nuts, and seeds. Choose low-fat dairy products such as skim or 1% milk or low-fat yogurt, cheese, and cottage cheese. Limit unhealthy fats  such as butter, hard margarine, and shortening. Exercise:  Exercise at least 30 minutes per day on most days of the week. Some examples of exercise include walking, biking, dancing, and swimming. You can also fit in more physical activity by taking the stairs instead of the elevator or parking farther away from stores. Include muscle strengthening activities 2 days each week. Regular exercise provides many health benefits.  It helps you manage your weight, and decreases your risk for type 2 diabetes, heart disease, stroke, and high blood pressure. Exercise can also help improve your mood. Ask your healthcare provider about the best exercise plan for you. General health and safety guidelines:   Do not smoke. Nicotine and other chemicals in cigarettes and cigars can cause lung damage. Ask your healthcare provider for information if you currently smoke and need help to quit. E-cigarettes or smokeless tobacco still contain nicotine. Talk to your healthcare provider before you use these products. Limit alcohol. A drink of alcohol is 12 ounces of beer, 5 ounces of wine, or 1½ ounces of liquor. Lose weight, if needed. Being overweight increases your risk of certain health conditions. These include heart disease, high blood pressure, type 2 diabetes, and certain types of cancer. Protect your skin. Do not sunbathe or use tanning beds. Use sunscreen with a SPF 15 or higher. Apply sunscreen at least 15 minutes before you go outside. Reapply sunscreen every 2 hours. Wear protective clothing, hats, and sunglasses when you are outside. Drive safely. Always wear your seatbelt. Make sure everyone in your car wears a seatbelt. A seatbelt can save your life if you are in an accident. Do not use your cell phone when you are driving. This could distract you and cause an accident. Pull over if you need to make a call or send a text message. Practice safe sex. Use latex condoms if are sexually active and have more than one partner. Your healthcare provider may recommend screening tests for sexually transmitted infections (STIs). Wear helmets, lifejackets, and protective gear. Always wear a helmet when you ride a bike or motorcycle, go skiing, or play sports that could cause a head injury. Wear protective equipment when you play sports. Wear a lifejacket when you are on a boat or doing water sports.     © Copyright [employer] 2023 Information is for End User's use only and may not be sold, redistributed or otherwise used for commercial purposes. The above information is an  only. It is not intended as medical advice for individual conditions or treatments. Talk to your doctor, nurse or pharmacist before following any medical regimen to see if it is safe and effective for you. Wellness Visit for Adults   AMBULATORY CARE:   A wellness visit  is when you see your healthcare provider to get screened for health problems. Your healthcare provider will also give you advice on how to stay healthy. Write down your questions so you remember to ask them. Ask your healthcare provider how often you should have a wellness visit. What happens at a wellness visit:  Your healthcare provider will ask about your health, and your family history of health problems. This includes high blood pressure, heart disease, and cancer. He or she will ask if you have symptoms that concern you, if you smoke, and about your mood. You may also be asked about your intake of medicines, supplements, food, and alcohol. Any of the following may be done: Your weight  will be checked. Your height may also be checked so your body mass index (BMI) can be calculated. Your BMI shows if you are at a healthy weight. Your blood pressure  and heart rate will be checked. Your temperature may also be checked. Blood and urine tests  may be done. Blood tests may be done to check your cholesterol levels. Abnormal cholesterol levels increase your risk for heart disease and stroke. You may also need a blood or urine test to check for diabetes if you are at increased risk. Urine tests may be done to look for signs of an infection or kidney disease. A physical exam  includes checking your heartbeat and lungs with a stethoscope. Your healthcare provider may also check your skin to look for sun damage. Screening tests  may be recommended. A screening test is done to check for diseases that may not cause symptoms.  The screening tests you may need depend on your age, gender, family history, and lifestyle habits. For example, colorectal screening may be recommended if you are 48years old or older. Screening tests you need if you are a woman:   A Pap smear  is used to screen for cervical cancer. Pap smears are usually done every 3 to 5 years depending on your age. You may need them more often if you have had abnormal Pap smear test results in the past. Ask your healthcare provider how often you should have a Pap smear. A mammogram  is an x-ray of your breasts to screen for breast cancer. Experts recommend mammograms every 2 years starting at age 48 years. You may need a mammogram at age 52 years or younger if you have an increased risk for breast cancer. Talk to your healthcare provider about when you should start having mammograms and how often you need them. Vaccines you may need:   Get an influenza vaccine  every year. The influenza vaccine protects you from the flu. Several types of viruses cause the flu. The viruses change over time, so new vaccines are made each year. Get a tetanus-diphtheria (Td) booster vaccine  every 10 years. This vaccine protects you against tetanus and diphtheria. Tetanus is a severe infection that may cause painful muscle spasms and lockjaw. Diphtheria is a severe bacterial infection that causes a thick covering in the back of your mouth and throat. Get a human papillomavirus (HPV) vaccine  if you are female and aged 23 to 32 or male 23 to 24 and never received it. This vaccine protects you from HPV infection. HPV is the most common infection spread by sexual contact. HPV may also cause vaginal, penile, and anal cancers. Get a pneumococcal vaccine  if you are aged 72 years or older. The pneumococcal vaccine is an injection given to protect you from pneumococcal disease. Pneumococcal disease is an infection caused by pneumococcal bacteria. The infection may cause pneumonia, meningitis, or an ear infection.     Get a shingles vaccine  if you are 60 or older, even if you have had shingles before. The shingles vaccine is an injection to protect you from the varicella-zoster virus. This is the same virus that causes chickenpox. Shingles is a painful rash that develops in people who had chickenpox or have been exposed to the virus. How to eat healthy:  My Plate is a model for planning healthy meals. It shows the types and amounts of foods that should go on your plate. Fruits and vegetables make up about half of your plate, and grains and protein make up the other half. A serving of dairy is included on the side of your plate. The amount of calories and serving sizes you need depends on your age, gender, weight, and height. Examples of healthy foods are listed below:  Eat a variety of vegetables  such as dark green, red, and orange vegetables. You can also include canned vegetables low in sodium (salt) and frozen vegetables without added butter or sauces. Eat a variety of fresh fruits , canned fruit in 100% juice, frozen fruit, and dried fruit. Include whole grains. At least half of the grains you eat should be whole grains. Examples include whole-wheat bread, wheat pasta, brown rice, and whole-grain cereals such as oatmeal.    Eat a variety of protein foods such as seafood (fish and shellfish), lean meat, and poultry without skin (turkey and chicken). Examples of lean meats include pork leg, shoulder, or tenderloin, and beef round, sirloin, tenderloin, and extra lean ground beef. Other protein foods include eggs and egg substitutes, beans, peas, soy products, nuts, and seeds. Choose low-fat dairy products such as skim or 1% milk or low-fat yogurt, cheese, and cottage cheese. Limit unhealthy fats  such as butter, hard margarine, and shortening. Exercise:  Exercise at least 30 minutes per day on most days of the week. Some examples of exercise include walking, biking, dancing, and swimming.  You can also fit in more physical activity by taking the stairs instead of the elevator or parking farther away from stores. Include muscle strengthening activities 2 days each week. Regular exercise provides many health benefits. It helps you manage your weight, and decreases your risk for type 2 diabetes, heart disease, stroke, and high blood pressure. Exercise can also help improve your mood. Ask your healthcare provider about the best exercise plan for you. General health and safety guidelines:   Do not smoke. Nicotine and other chemicals in cigarettes and cigars can cause lung damage. Ask your healthcare provider for information if you currently smoke and need help to quit. E-cigarettes or smokeless tobacco still contain nicotine. Talk to your healthcare provider before you use these products. Limit alcohol. A drink of alcohol is 12 ounces of beer, 5 ounces of wine, or 1½ ounces of liquor. Lose weight, if needed. Being overweight increases your risk of certain health conditions. These include heart disease, high blood pressure, type 2 diabetes, and certain types of cancer. Protect your skin. Do not sunbathe or use tanning beds. Use sunscreen with a SPF 15 or higher. Apply sunscreen at least 15 minutes before you go outside. Reapply sunscreen every 2 hours. Wear protective clothing, hats, and sunglasses when you are outside. Drive safely. Always wear your seatbelt. Make sure everyone in your car wears a seatbelt. A seatbelt can save your life if you are in an accident. Do not use your cell phone when you are driving. This could distract you and cause an accident. Pull over if you need to make a call or send a text message. Practice safe sex. Use latex condoms if are sexually active and have more than one partner. Your healthcare provider may recommend screening tests for sexually transmitted infections (STIs). Wear helmets, lifejackets, and protective gear.   Always wear a helmet when you ride a bike or motorcycle, go skiing, or play sports that could cause a head injury. Wear protective equipment when you play sports. Wear a lifejacket when you are on a boat or doing water sports. © Copyright [employer] 2023 Information is for End User's use only and may not be sold, redistributed or otherwise used for commercial purposes. The above information is an  only. It is not intended as medical advice for individual conditions or treatments. Talk to your doctor, nurse or pharmacist before following any medical regimen to see if it is safe and effective for you.

## 2023-11-30 NOTE — PROGRESS NOTES
[facility]    NAME: Tejas Lantigua  AGE: 46 y.o. SEX: male  : 1975     DATE: 2023     Assessment and Plan:     Problem List Items Addressed This Visit          Endocrine    Type 2 diabetes mellitus with hyperglycemia, without long-term current use of insulin (HCC)    Relevant Orders    Comprehensive metabolic panel    CBC and differential    Lipid Panel with Direct LDL reflex    HEMOGLOBIN A1C W/ EAG ESTIMATION    TSH, 3rd generation with Free T4 reflex    Hemoglobin A1c is 7.3. Patient instructed to eat a healthy low fat/diabetic diet. Continue current medications. Repeat hemoglobin A1c in 3 months. Cardiovascular and Mediastinum    Essential hypertension    Relevant Orders    Comprehensive metabolic panel    CBC and differential    Lipid Panel with Direct LDL reflex    /70. Continue lisinopril 30mg daily. Other    Morbid obesity ([address])    Relevant Orders    Comprehensive metabolic panel    CBC and differential    Lipid Panel with Direct LDL reflex    TSH, 3rd generation with Free T4 reflex    Patient instructed to eat a healthy low fat/diabetic diet. Vitamin D deficiency    Relevant Medications    ergocalciferol (VITAMIN D2) 50,000 units    Other Relevant Orders    Vitamin D 25 hydroxy    Vitamin D level is 18. Vitamin D 50,000 units weekly prescribed. Medication information and side effects reviewed. Repeat vitamin D level in 3 months. Mixed hyperlipidemia    Relevant Medications    niacin (NIASPAN) 750 MG CR tablet    Other Relevant Orders    Comprehensive metabolic panel    CBC and differential    Lipid Panel with Direct LDL reflex    Total cholesterol 149, LDL 61, and Triglycerides 232. Continue crestor 5mg daily. Increased Niacin to 750mg  daily. Abnormal CBC    Relevant Orders    Ambulatory Referral to Hematology / Oncology    RBC is elevated.    Patient referred to hematology for further evaluation. Annual physical exam - Primary  Patient instructed to eat a healthy low fat/diabetic diet. Encounter for immunization    Relevant Orders    Pneumococcal Conjugate Vaccine 20-valent (Pcv20) (Completed)    Anxiety    Relevant Medications    escitalopram (LEXAPRO) 5 mg tablet    Patient reports increased anxiety recently. Denies any depression or suicidal thoughts. Discussion on anxiety and treatment. Lexapro 5mg daily prescribed. Medication information and side effects reviewed. Screening for prostate cancer    Relevant Orders    PSA, Total Screen    Hyponatremia    Relevant Orders    Ambulatory Referral to Nephrology    Comprehensive metabolic panel    Patient referred to nephrology for further evaluation. Reviewed lab results with the patient. Patient instructed to follow-up in 1 month for anxiety or sooner prn. Immunizations and preventive care screenings were discussed with patient today. Appropriate education was printed on patient's after visit summary. Discussed risks and benefits of prostate cancer screening. We discussed the controversial history of PSA screening for prostate cancer in the [facility] as well as the risk of over detection and over treatment of prostate cancer by way of PSA screening. The patient understands that PSA blood testing is an imperfect way to screen for prostate cancer and that elevated PSA levels in the blood may also be caused by infection, inflammation, prostatic trauma or manipulation, urological procedures, or by benign prostatic enlargement. Counseling:  Dental Health: discussed importance of regular tooth brushing, flossing, and dental visits. Injury prevention: discussed safety/seat belts, safety helmets, smoke detectors, carbon monoxide detectors,   Exercise: the importance of regular exercise/physical activity was discussed. Recommend exercise 3-5 times per week for at least 30 minutes. Depression Screening and Follow-up Plan: Patient was screened for depression during today's encounter. They screened negative with a PHQ-2 score of 0. Return in about 1 month (around 12/30/2023). Chief Complaint:     Chief Complaint   Patient presents with    Follow-up      History of Present Illness:     Adult Annual Physical   Patient here for a comprehensive physical exam.     Patient reports increased anxiety recently. Patient reports that he is worrying more. Denies any depression or suicidal thoughts. Patient would like to be on anxiety medication. Patient would prefer lexapro. Patient reports that his son is on lexapro. Patient is here for a follow-up for DM 2. Patient reports that he watches his diet. Patient reports that he takes his medications as prescribed. Patient is here for a follow-up for HTN. Patient takes lisinopril 30mg daily. Denies any chest pain, SOB, or palpitations. Patient is here for a follow-up for obesity and hyperlipidemia. Patient takes niacin and crestor. Patient reports that he watches his diet. Diet and Physical Activity  Diet/Nutrition: diabetic diet. Exercise: 3-4 times a week on average. Depression Screening  PHQ-2/9 Depression Screening    Little interest or pleasure in doing things: 0 - not at all  Feeling down, depressed, or hopeless: 0 - not at all  PHQ-2 Score: 0  PHQ-2 Interpretation: Negative depression screen       General Health  Sleep:  6 hours of sleep  . Hearing: normal - bilateral.  Vision: no vision problems. Dental: regular dental visits and brushes teeth twice daily.  Health  Symptoms include: none    Advanced Care Planning  Do you have an advanced directive? no  Do you have a durable medical power of ? no     Review of Systems:     Review of Systems   Constitutional:  Negative for chills and fever. HENT:  Negative for congestion, ear pain, sinus pressure, sore throat and trouble swallowing.     Eyes: Negative for pain, discharge and redness. Respiratory:  Negative for cough, chest tightness, shortness of breath and wheezing. Cardiovascular:  Negative for chest pain, palpitations and leg swelling. Gastrointestinal:  Negative for abdominal pain, blood in stool, diarrhea, nausea and vomiting. Genitourinary:  Negative for dysuria and hematuria. Musculoskeletal:  Negative for myalgias. Skin:  Negative for rash. Neurological:  Negative for dizziness, seizures, syncope and light-headedness. Psychiatric/Behavioral:  Negative for suicidal ideas. Denies any depression.        Past Medical History:     Past Medical History:   Diagnosis Date    Diabetes ([address])     Elevated ALT measurement 5/11/2022    Hypertension     Hyponatremia 5/24/2023    Leukocytosis 1/28/2020    Morbid obesity with BMI of 45.0-49.9, adult [facility])       Past Surgical History:     Past Surgical History:   Procedure Laterality Date    WISDOM TOOTH EXTRACTION        Family History:     Family History   Problem Relation Age of Onset    No Known Problems Father     Hypertension Maternal Grandfather     Diabetes Maternal Grandfather     Heart disease Maternal Grandfather     Cancer Paternal Aunt     Diabetes Mother     Dementia Mother     Obesity Mother     Stroke Neg Hx       Social History:     Social History     Socioeconomic History    Marital status: /Civil Union     Spouse name: None    Number of children: None    Years of education: None    Highest education level: None   Occupational History    None   Tobacco Use    Smoking status: Former    Smokeless tobacco: Never    Tobacco comments:     Never smoker per [employer]   Vaping Use    Vaping Use: Never used   Substance and Sexual Activity    Alcohol use: No    Drug use: No    Sexual activity: None   Other Topics Concern    None   Social History Narrative    None     Social Determinants of Health     Financial Resource Strain: Not on file   Food Insecurity: Not on file Transportation Needs: Not on file   Physical Activity: Not on file   Stress: Not on file   Social Connections: Not on file   Intimate Partner Violence: Not on file   Housing Stability: Not on file      Current Medications:     Current Outpatient Medications   Medication Sig Dispense Refill    ergocalciferol (VITAMIN D2) 50,000 units Take 1 capsule (50,000 Units total) by mouth once a week 12 capsule 0    escitalopram (LEXAPRO) 5 mg tablet Take 1 tablet (5 mg total) by mouth daily 30 tablet 1    niacin (NIASPAN) 750 MG CR tablet Take 1 tablet (750 mg total) by mouth daily at bedtime 90 tablet 1    dapagliflozin (Farxiga) 10 MG tablet Take 1 tablet (10 mg total) by mouth in the morning 90 tablet 1    lisinopril (ZESTRIL) 30 mg tablet Take 1 tablet (30 mg total) by mouth daily 90 tablet 0    metFORMIN (GLUCOPHAGE) 1000 MG tablet Take 1 tablet (1,000 mg total) by mouth 2 (two) times a day with meals 180 tablet 0    Multiple Vitamin (Multivitamin Adult) TABS Take 1 tablet by mouth daily      Omega-3 Fatty Acids (fish oil) 1,000 mg Take 2 capsules (2,000 mg total) by mouth daily 30 capsule 3    rosuvastatin (CRESTOR) 5 mg tablet Take 1 tablet (5 mg total) by mouth daily 90 tablet 1    sitaGLIPtin (JANUVIA) 100 mg tablet Take 1 tablet (100 mg total) by mouth daily 90 tablet 1    TREMFYA subcutaneous injection Inject 100 mg under the skin every 56 days        No current facility-administered medications for this visit. Allergies:     No Known Allergies   Physical Exam:     /70   Pulse 93   Resp 16   Ht 5' 11" (1.803 m)   Wt (!) 143 kg (315 lb)   SpO2 98%   BMI 43.93 kg/m²     Physical Exam  Vitals reviewed. Constitutional:       General: He is not in acute distress. Appearance: He is obese. He is not ill-appearing or diaphoretic.    HENT:      Right Ear: Tympanic membrane, ear canal and external ear normal.      Left Ear: Tympanic membrane, ear canal and external ear normal.      Nose: Nose normal. Mouth/Throat:      Mouth: Mucous membranes are moist.      Pharynx: Oropharynx is clear. No oropharyngeal exudate or posterior oropharyngeal erythema. Eyes:      Conjunctiva/sclera: Conjunctivae normal.      Pupils: Pupils are equal, round, and reactive to light. Cardiovascular:      Rate and Rhythm: Normal rate and regular rhythm. Pulses: Normal pulses. Heart sounds: Normal heart sounds. Comments: No edema noted. Pulmonary:      Effort: Pulmonary effort is normal. No respiratory distress. Breath sounds: Normal breath sounds. No wheezing. Abdominal:      Palpations: Abdomen is soft. There is no mass. Tenderness: There is no abdominal tenderness. Genitourinary:     Comments: Patient deferred exam.   Musculoskeletal:         General: Normal range of motion. Cervical back: Normal range of motion. Comments: Gait wnl. Lymphadenopathy:      Cervical: No cervical adenopathy. Skin:     Findings: No rash. Neurological:      Mental Status: He is alert and oriented to person, place, and time. Cranial Nerves: No cranial nerve deficit.       Coordination: Coordination normal.      Gait: Gait normal.   Psychiatric:         Mood and Affect: Mood normal.          Ziyad Olivarez, [id number]

## 2023-12-01 ENCOUNTER — TELEPHONE (OUTPATIENT)
Dept: NEPHROLOGY | Facility: CLINIC | Age: 48
End: 2023-12-01

## 2023-12-01 ENCOUNTER — TELEPHONE (OUTPATIENT)
Age: 48
End: 2023-12-01

## 2023-12-01 NOTE — TELEPHONE ENCOUNTER
Pt called as he saw his urine results in [facility] and got a call from nephrology. He wants info on his results and to confirm he should go ahead and make an appt with nephrology. Please advise pt.

## 2023-12-04 ENCOUNTER — TELEPHONE (OUTPATIENT)
Dept: HEMATOLOGY ONCOLOGY | Facility: CLINIC | Age: 48
End: 2023-12-04

## 2023-12-04 NOTE — TELEPHONE ENCOUNTER
Patient calling back regarding urine test and if he should be followign up with nephrology.   Charlene Calvert

## 2023-12-04 NOTE — TELEPHONE ENCOUNTER
I called Fahad Leal in response to a referral that was received for patient to establish care with Hematology. Outreach was made to schedule a consultation. A consultation was scheduled for patient during this call.  Patient is scheduled on 2/2/24 at [facility] with Katie Lugo at the [facility]

## 2023-12-07 ENCOUNTER — TELEPHONE (OUTPATIENT)
Age: 48
End: 2023-12-07

## 2023-12-07 NOTE — TELEPHONE ENCOUNTER
New Patient Intake Form   Patient Details   Alanna Sanchez     1975     [id number]     Insurance Information   Name of Adama    Does the patient need an insurance referral? no   If patient has Pitney Ramiro, please ask if they will be using their Pitney Ramiro. Appointment Information   Who is calling to schedule? If not patient, what is callers name? Patient   Referring Provider  Kevin Valdez   Reason for Appt (Diagnosis) hyponatremia    Does Patient have labs/urine done at [facility]? If not, where do they go? List the date of last lab / urine  *Please try to get labs 2 years back if not at [address] Yes 11/2023   Has patient been hospitalized recently? If yes, list name and location of hospital they were in no   Has patient been seen by a Nephrologist before? If yes, list name, location and phone number no   Has the patient had renal imaging done? If so, list the most recent date and type of imaging no    Does patient have a history of Kidney Stones? no   Appointment Details   Is there a referral on file?  yes    Appointment Date 2/20    Location  [place]   Miscellaneous

## 2023-12-07 NOTE — TELEPHONE ENCOUNTER
Patient called in to inform PCP that OV scheduled with Nephrology for Feb. 20, 2024. Patient wants to know if that is alright. In addition, patient is unsure why the referral to Nephrology; is for his low sodium level? Please follow up with patient for reason for referral and it alright to wait until February to be seen.

## 2023-12-14 ENCOUNTER — TELEPHONE (OUTPATIENT)
Dept: HEMATOLOGY ONCOLOGY | Facility: CLINIC | Age: 48
End: 2023-12-14

## 2023-12-14 NOTE — TELEPHONE ENCOUNTER
Appointment Change  Cancel, Reschedule, Change to Virtual      Who are you speaking with? Patient   If it is not the patient, is the caller listed on the communication consent form? Yes   Which provider is the appointment scheduled with? Renata Deutsch PA-C   When was the original appointment scheduled? Please list date and time 2/2/24   At which location is the appointment scheduled to take place? [facility]   Was the appointment rescheduled? Was the appointment changed from an in person visit to a virtual visit? If so, please list the details of the change. 2/6/24   What is the reason for the appointment change? provider       Was STAR transport scheduled? No   Does STAR transport need to be scheduled for the new visit (if applicable) No   Does the patient need an infusion appointment rescheduled? No   Does the patient have an upcoming infusion appointment scheduled? If so, when? No   Is the patient undergoing chemotherapy? No   For appointments cancelled with less than 24 hours:  Was the no-show policy reviewed?  Yes

## 2023-12-22 DIAGNOSIS — F41.9 ANXIETY: ICD-10-CM

## 2023-12-22 RX ORDER — ESCITALOPRAM OXALATE 5 MG/1
5 TABLET ORAL DAILY
Qty: 90 TABLET | Refills: 1 | Status: SHIPPED | OUTPATIENT
Start: 2023-12-22

## 2024-01-02 DIAGNOSIS — E78.2 MIXED HYPERLIPIDEMIA: ICD-10-CM

## 2024-01-02 RX ORDER — ROSUVASTATIN CALCIUM 5 MG/1
5 TABLET, COATED ORAL DAILY
Qty: 90 TABLET | Refills: 0 | Status: SHIPPED | OUTPATIENT
Start: 2024-01-02

## 2024-01-03 ENCOUNTER — TELEMEDICINE (OUTPATIENT)
Dept: FAMILY MEDICINE CLINIC | Facility: CLINIC | Age: 49
End: 2024-01-03
Payer: COMMERCIAL

## 2024-01-03 VITALS — WEIGHT: 315 LBS | BODY MASS INDEX: 44.1 KG/M2 | HEIGHT: 71 IN

## 2024-01-03 DIAGNOSIS — F41.9 ANXIETY: Primary | ICD-10-CM

## 2024-01-03 DIAGNOSIS — E87.1 HYPONATREMIA: ICD-10-CM

## 2024-01-03 DIAGNOSIS — E11.65 TYPE 2 DIABETES MELLITUS WITH HYPERGLYCEMIA, WITHOUT LONG-TERM CURRENT USE OF INSULIN (HCC): ICD-10-CM

## 2024-01-03 PROBLEM — R80.9 MICROALBUMINURIA: Status: ACTIVE | Noted: 2024-01-03

## 2024-01-03 PROCEDURE — 99214 OFFICE O/P EST MOD 30 MIN: CPT | Performed by: NURSE PRACTITIONER

## 2024-01-03 RX ORDER — ESCITALOPRAM OXALATE 10 MG/1
10 TABLET ORAL DAILY
Qty: 90 TABLET | Refills: 1 | Status: SHIPPED | OUTPATIENT
Start: 2024-01-03

## 2024-01-03 NOTE — PROGRESS NOTES
Virtual Regular Visit    Verification of patient location:    Patient is located at Home in the following state in which I hold an active license [place]      Assessment/Plan:    Problem List Items Addressed This Visit          Endocrine    Type 2 diabetes mellitus with hyperglycemia, without long-term current use of insulin (HCC)  Continue current medications.   Patient encouraged to eat a healthy low fat/diabetic diet.        Other    Anxiety - Primary    Relevant Medications    escitalopram (LEXAPRO) 10 mg tablet    Patient reports that he takes lexapro 5mg daily and it helps.   Patient reports that he would like to try 10mg daily to see if his anxiety improves more.   Denies any depression or suicidal thoughts.   Increased lexapro to 10mg daily.       Hyponatremia  Patient has a follow-up scheduled with nephrology.     Patient instructed to follow-up in 2 months or sooner prn.             Reason for visit is   Chief Complaint   Patient presents with    Follow-up     1 month follow up    Virtual Regular Visit          Encounter provider L[facility] Dorado    Provider located at [address] [place] [id number]      Recent Visits  No visits were found meeting these conditions.  Showing recent visits within past 7 days and meeting all other requirements  Today's Visits  Date Type Provider Dept   01/03/24 Telemedicine [facility]   Showing today's visits and meeting all other requirements  Future Appointments  No visits were found meeting these conditions.  Showing future appointments within next 150 days and meeting all other requirements       The patient was identified by name and date of birth. Mario Jenkins was informed that this is a telemedicine visit and that the visit is being conducted through the [employer] platform. He agrees to proceed..  My office door was closed. No one else was in the room.  He acknowledged  consent and understanding of privacy and security of the video platform. The patient has agreed to participate and understands they can discontinue the visit at any time.    Patient is aware this is a billable service.     Subjective  Mario Jenkins is a 48 y.o. male  .      Patient is here for a virtual follow-up for anxiety.   Patient takes lexapro 5mg daily and it helps.   Patient reports that he feels better overall.   Patient reports that he would like to try the 10mg daily to see if his anxiety improves more.   Denies any depression or suicidal thoughts.     Patient is here for a follow-up for type 2 DM.   Patient takes his medications as prescribed.   Patient reports that he has been watching his diet.   Denies any dizziness, nausea, or vomiting.     Patient reports that he has a follow-up in February with nephrology for hyponatremia.          Past Medical History:   Diagnosis Date    Diabetes (HCC)     Elevated ALT measurement 5/11/2022    Hypertension     Hyponatremia 5/24/2023    Leukocytosis 1/28/2020    Morbid obesity with BMI of 45.0-49.9, adult (HCC)        Past Surgical History:   Procedure Laterality Date    WISDOM TOOTH EXTRACTION         Current Outpatient Medications   Medication Sig Dispense Refill    dapagliflozin (Farxiga) 10 MG tablet Take 1 tablet (10 mg total) by mouth in the morning 90 tablet 1    ergocalciferol (VITAMIN D2) 50,000 units Take 1 capsule (50,000 Units total) by mouth once a week 12 capsule 0    escitalopram (LEXAPRO) 10 mg tablet Take 1 tablet (10 mg total) by mouth daily 90 tablet 1    lisinopril (ZESTRIL) 30 mg tablet Take 1 tablet (30 mg total) by mouth daily 90 tablet 0    metFORMIN (GLUCOPHAGE) 1000 MG tablet Take 1 tablet (1,000 mg total) by mouth 2 (two) times a day with meals 180 tablet 0    Multiple Vitamin (Multivitamin Adult) TABS Take 1 tablet by mouth daily      niacin (NIASPAN) 750 MG CR tablet Take 1 tablet (750 mg total) by mouth daily at bedtime 90 tablet 1     "Omega-3 Fatty Acids (fish oil) 1,000 mg Take 2 capsules (2,000 mg total) by mouth daily 30 capsule 3    rosuvastatin (CRESTOR) 5 mg tablet Take 1 tablet (5 mg total) by mouth daily 90 tablet 0    sitaGLIPtin (JANUVIA) 100 mg tablet Take 1 tablet (100 mg total) by mouth daily 90 tablet 1    TREMFYA subcutaneous injection Inject 100 mg under the skin every 56 days        No current facility-administered medications for this visit.        No Known Allergies    Review of Systems   Constitutional:  Negative for chills and fever.   HENT:  Negative for congestion, ear pain and sore throat.    Respiratory:  Negative for cough, chest tightness and shortness of breath.    Cardiovascular:  Negative for chest pain.   Gastrointestinal:  Negative for abdominal pain, diarrhea, nausea and vomiting.   Skin:  Negative for rash.   Neurological:  Negative for dizziness, seizures, syncope and light-headedness.       Video Exam    Vitals:    01/03/24 0803   Weight: (!) 143 kg (315 lb)   Height: 5' 11\" (1.803 m)       Physical Exam  Constitutional:       General: He is not in acute distress.     Appearance: He is not ill-appearing or diaphoretic.   HENT:      Right Ear: External ear normal.      Left Ear: External ear normal.   Pulmonary:      Effort: Pulmonary effort is normal. No respiratory distress.   Neurological:      Mental Status: He is alert and oriented to person, place, and time.   Psychiatric:         Mood and Affect: Mood normal.          Visit Time  Total Visit Duration: 10 minutes         "

## 2024-01-15 ENCOUNTER — TELEPHONE (OUTPATIENT)
Dept: NEPHROLOGY | Facility: CLINIC | Age: 49
End: 2024-01-15

## 2024-01-29 PROBLEM — Z12.5 SCREENING FOR PROSTATE CANCER: Status: RESOLVED | Noted: 2023-11-30 | Resolved: 2024-01-29

## 2024-01-30 DIAGNOSIS — I10 ESSENTIAL HYPERTENSION: ICD-10-CM

## 2024-01-31 RX ORDER — LISINOPRIL 30 MG/1
30 TABLET ORAL DAILY
Qty: 90 TABLET | Refills: 1 | Status: SHIPPED | OUTPATIENT
Start: 2024-01-31

## 2024-02-02 ENCOUNTER — TELEPHONE (OUTPATIENT)
Dept: HEMATOLOGY ONCOLOGY | Facility: CLINIC | Age: 49
End: 2024-02-02

## 2024-02-02 NOTE — TELEPHONE ENCOUNTER
Appointment Confirmation   Who are you speaking with? Patient   If it is not the patient, are they listed on an active communication consent form? N/A   Which provider is the appointment scheduled with?  Vanessa Hightower PA-C   When is the appointment scheduled?  Please list date and time  02/06/2024 @ 9AM    At which location is the appointment scheduled to take place? [place]   Did caller verbalize understanding of appointment details? Yes

## 2024-02-06 ENCOUNTER — OFFICE VISIT (OUTPATIENT)
Dept: HEMATOLOGY ONCOLOGY | Facility: CLINIC | Age: 49
End: 2024-02-06
Payer: COMMERCIAL

## 2024-02-06 VITALS
RESPIRATION RATE: 17 BRPM | BODY MASS INDEX: 43.4 KG/M2 | DIASTOLIC BLOOD PRESSURE: 74 MMHG | OXYGEN SATURATION: 99 % | HEART RATE: 125 BPM | WEIGHT: 310 LBS | SYSTOLIC BLOOD PRESSURE: 128 MMHG | HEIGHT: 71 IN | TEMPERATURE: 97.9 F

## 2024-02-06 DIAGNOSIS — R79.89 ABNORMAL CBC: Primary | ICD-10-CM

## 2024-02-06 DIAGNOSIS — R71.8 ELEVATED HEMATOCRIT: ICD-10-CM

## 2024-02-06 PROCEDURE — 99203 OFFICE O/P NEW LOW 30 MIN: CPT | Performed by: PHYSICIAN ASSISTANT

## 2024-02-06 NOTE — PROGRESS NOTES
Oncology Outpatient Consult Note  Mario Jenkins 48 y.o. male MRN: @ Encounter: [id number]        Date:  2/6/2024      Assessment/ Plan:      Mild elevation of hematocrit 46-51 since at least 2020.  No symptoms just of of primary polycythemia.  We did discuss primary versus secondary polycythemia.    Orders Placed This Encounter   Procedures    US abdomen complete    CBC and differential    Erythropoietin    JAK2 [id number] rfx CALR/MPL/E12-15      Discussed colon cancer screening.  States he will go this summer.        HPI:  Mario Jenkins is a 48 y.o. seen for initial consultation 2/6/2024 at the referral of LARS Dorado regarding elevated hematocrit.    Past medical history of diabetes, hypertension, osteoarthritis, seborrheic dermatitis, plaque psoriasis, obesity, hypertriglyceridemia, vitamin D deficiency, hyponatremia, anxiety.    8/21/23 hematocrit 49    November 22, 2023 hemoglobin 17, hematocrit 50.8,, MCV 84, white blood cell count 9.4, 45% neutrophils, 43% lymphocytes, 8% monocytes, platelets 251  CMP normal with the exception of elevated glucose    He lost approximately 50 pounds 2 years ago through diet modifications.    Appetite has been stable.  Does not bruise or bleed easily.  He does occasionally headaches if he eats white carbs.  Denies any pruritus after shower.  No neuropathy, dizziness.    He does snore occasionally.  This has improved since he is lost weight.    He does drink at least 64 ounces of water daily.  He does not smoke or take testosterone.        Review of Systems   Constitutional:  Negative for appetite change, chills, diaphoresis, fatigue, fever and unexpected weight change.   HENT:   Negative for mouth sores, nosebleeds, sore throat, tinnitus and voice change.    Eyes:  Negative for eye problems.   Respiratory:  Negative for chest tightness, cough, shortness of breath and wheezing.    Cardiovascular:  Negative for chest pain, leg swelling and palpitations.    Gastrointestinal:  Negative for abdominal distention, abdominal pain, blood in stool, constipation, diarrhea, nausea, rectal pain and vomiting.   Endocrine: Negative for hot flashes.   Genitourinary: Negative.     Musculoskeletal:  Negative for gait problem and myalgias.   Skin:  Negative for itching and rash.   Neurological:  Negative for dizziness, gait problem, headaches, light-headedness and numbness.   Hematological:  Negative for adenopathy.   Psychiatric/Behavioral:  Negative for confusion and sleep disturbance. The patient is not nervous/anxious.         Test Results:      Labs:   Lab Results   Component Value Date    HGB 17.0 11/22/2023    HCT 50.8 (H) 11/22/2023    MCV 84 11/22/2023     11/22/2023    WBC 9.43 11/22/2023    NRBC 0 11/22/2023     Lab Results   Component Value Date     06/25/2016    K 4.2 11/22/2023     11/22/2023    CO2 25 11/22/2023    ANIONGAP 9 10/15/2014    BUN 19 11/22/2023    CREATININE 0.83 11/22/2023    GLUCOSE 97 10/15/2014    GLUF 161 (H) 11/22/2023    CALCIUM 9.8 11/22/2023    AST 26 11/22/2023    ALT 40 11/22/2023    ALKPHOS 68 11/22/2023    PROT 7.5 06/25/2016    BILITOT 0.6 06/25/2016    EGFR 102 11/22/2023           Imaging: .No results found.          Active Problems:   Patient Active Problem List   Diagnosis    Essential hypertension    Hypertriglyceridemia    Morbid obesity (HCC)    Osteoarthritis of both knees    Patellofemoral syndrome, bilateral    Healthcare maintenance    Prostate cancer screening    Screening for cardiovascular, respiratory, and genitourinary diseases    Immunization due    Snoring    Vitamin D deficiency    Dermatitis    Seborrheic dermatitis    BMI 40.0-44.9, adult (HCC)    Psoriasis    Hyperlipidemia    Elevated fasting blood sugar    Abnormal weight gain    Plaque psoriasis    Mixed hyperlipidemia    Type 2 diabetes mellitus with hyperglycemia, without long-term current use of insulin (HCC)    Bilateral knee pain    Chronic  left shoulder pain    Mid back pain    Abnormal CBC    Annual physical exam    Encounter for immunization    Anxiety    Hyponatremia    Microalbuminuria       Past Medical History:   Past Medical History:   Diagnosis Date    Diabetes (HCC)     Elevated ALT measurement 5/11/2022    Hypertension     Hyponatremia 5/24/2023    Leukocytosis 1/28/2020    Morbid obesity with BMI of 45.0-49.9, adult (HCC)        Surgical History:   Past Surgical History:   Procedure Laterality Date    WISDOM TOOTH EXTRACTION         Family History:    Family History   Problem Relation Age of Onset    No Known Problems Father     Hypertension Maternal Grandfather     Diabetes Maternal Grandfather     Heart disease Maternal Grandfather     Cancer Paternal Aunt     Diabetes Mother     Dementia Mother     Obesity Mother     Stroke Neg Hx        Cancer-related family history includes Cancer in his paternal aunt.    Social History:   Social History     Socioeconomic History    Marital status: /Civil Union     Spouse name: Not on file    Number of children: Not on file    Years of education: Not on file    Highest education level: Not on file   Occupational History    Not on file   Tobacco Use    Smoking status: Former    Smokeless tobacco: Never    Tobacco comments:     Never smoker per [employer]   Vaping Use    Vaping status: Never Used   Substance and Sexual Activity    Alcohol use: No    Drug use: No    Sexual activity: Not on file   Other Topics Concern    Not on file   Social History Narrative    Not on file     Social Determinants of Health     Financial Resource Strain: Not on file   Food Insecurity: Not on file   Transportation Needs: Not on file   Physical Activity: Not on file   Stress: Not on file   Social Connections: Not on file   Intimate Partner Violence: Not on file   Housing Stability: Not on file       Current Medications:   Current Outpatient Medications   Medication Sig Dispense Refill    dapagliflozin (Farxiga) 10 MG  "tablet Take 1 tablet (10 mg total) by mouth in the morning 90 tablet 1    ergocalciferol (VITAMIN D2) 50,000 units Take 1 capsule (50,000 Units total) by mouth once a week 12 capsule 0    escitalopram (LEXAPRO) 10 mg tablet Take 1 tablet (10 mg total) by mouth daily 90 tablet 1    lisinopril (ZESTRIL) 30 mg tablet TAKE 1 TABLET BY MOUTH EVERY DAY 90 tablet 1    metFORMIN (GLUCOPHAGE) 1000 MG tablet Take 1 tablet (1,000 mg total) by mouth 2 (two) times a day with meals 180 tablet 0    Multiple Vitamin (Multivitamin Adult) TABS Take 1 tablet by mouth daily      niacin (NIASPAN) 750 MG CR tablet Take 1 tablet (750 mg total) by mouth daily at bedtime 90 tablet 1    Omega-3 Fatty Acids (fish oil) 1,000 mg Take 2 capsules (2,000 mg total) by mouth daily 30 capsule 3    rosuvastatin (CRESTOR) 5 mg tablet Take 1 tablet (5 mg total) by mouth daily 90 tablet 0    sitaGLIPtin (JANUVIA) 100 mg tablet Take 1 tablet (100 mg total) by mouth daily 90 tablet 1    TREMFYA subcutaneous injection Inject 100 mg under the skin every 56 days        No current facility-administered medications for this visit.       Allergies: No Known Allergies      Physical Exam:    Body surface area is 2.54 meters squared.   Ht Readings from Last 3 Encounters:   02/06/24 5' 11\" (1.803 m)   01/03/24 5' 11\" (1.803 m)   11/30/23 5' 11\" (1.803 m)       Wt Readings from Last 3 Encounters:   02/06/24 (!) 141 kg (310 lb)   01/03/24 (!) 143 kg (315 lb)   11/30/23 (!) 143 kg (315 lb)        Temp Readings from Last 3 Encounters:   02/06/24 97.9 °F (36.6 °C)   05/27/21 (!) 97.3 °F (36.3 °C)   03/23/21 (!) 96.9 °F (36.1 °C)        BP Readings from Last 3 Encounters:   02/06/24 128/74   11/30/23 120/70   08/24/23 124/82         Pulse Readings from Last 3 Encounters:   02/06/24 (!) 125   11/30/23 93   08/24/23 80          Physical Exam    Physical Exam  Vitals reviewed.   Constitutional:       General: He is not in acute distress.     Appearance: He is well-developed. " He is not diaphoretic.   HENT:      Head: Normocephalic and atraumatic.   Eyes:      Conjunctiva/sclera: Conjunctivae normal.   Neck:      Trachea: No tracheal deviation.   Cardiovascular:      Rate and Rhythm: Normal rate and regular rhythm.      Heart sounds: No murmur heard.     No friction rub. No gallop.   Pulmonary:      Effort: Pulmonary effort is normal. No respiratory distress.      Breath sounds: Normal breath sounds. No wheezing or rales.   Chest:      Chest wall: No tenderness.   Abdominal:      General: There is no distension.      Palpations: Abdomen is soft.      Tenderness: There is no abdominal tenderness (Very mild in the left upper quadrant).   Musculoskeletal:      Cervical back: Normal range of motion and neck supple.   Lymphadenopathy:      Cervical: No cervical adenopathy.   Skin:     General: Skin is warm and dry.      Coloration: Skin is not pale.      Findings: No erythema.   Neurological:      Mental Status: He is alert and oriented to person, place, and time.   Psychiatric:         Behavior: Behavior normal.         Thought Content: Thought content normal.         Judgment: Judgment normal.             Emergency Contacts:    Extended Emergency Contact Information  Primary Emergency Contact: StacycarloZenobia  Mobile Phone: [phone]  Relation: Spouse

## 2024-02-12 ENCOUNTER — CONSULT (OUTPATIENT)
Dept: NEPHROLOGY | Facility: CLINIC | Age: 49
End: 2024-02-12
Payer: COMMERCIAL

## 2024-02-12 VITALS
HEART RATE: 90 BPM | DIASTOLIC BLOOD PRESSURE: 83 MMHG | HEIGHT: 71 IN | SYSTOLIC BLOOD PRESSURE: 129 MMHG | WEIGHT: 311 LBS | BODY MASS INDEX: 43.54 KG/M2

## 2024-02-12 DIAGNOSIS — E11.9 TYPE 2 DIABETES MELLITUS WITHOUT COMPLICATION, WITHOUT LONG-TERM CURRENT USE OF INSULIN (HCC): ICD-10-CM

## 2024-02-12 DIAGNOSIS — E87.1 HYPONATREMIA: Primary | ICD-10-CM

## 2024-02-12 DIAGNOSIS — I10 ESSENTIAL HYPERTENSION: ICD-10-CM

## 2024-02-12 PROCEDURE — 99203 OFFICE O/P NEW LOW 30 MIN: CPT | Performed by: INTERNAL MEDICINE

## 2024-02-12 NOTE — PROGRESS NOTES
NEPHROLOGY OUTPATIENT CONSULTATION   Mario Jenkins 48 y.o. male MRN: [id number]  Date: 02/12/24  Reason for consultation: Hyponatremia    ASSESSMENT and PLAN:  48-year-old male was seen in nephrology office today for evaluation and management of hyponatremia.    # Hyponatremia  - Baseline serum sodium appears to be 133-135  - He is on Lexapro which can cause SIADH  - There is also a component of translocation in setting of hyperglycemia  - TSH within normal limits in 05/2023  - He denies alcohol or beer intake  - His fluid intake is around 64 to 120 ounces per day    >>Plan  - Check serum sodium level now  - Check serum osmolality to confirm hypotonic hyponatremia  - Check urine osmolality to confirm ADH dependent hyponatremia  - Check urine sodium to check activity of RAAS system  - Check serum uric acid level as this is typically low in SIADH  - Check a.m. cortisol to rule out hypercortisolism  - Discussed fluid restriction to around 60 to 80 ounces per day  - Okay to continue Lexapro as this has been helping control symptoms  - Based on the results of above workup, may need to add low-dose loop diuretic    # Hypertension/Volume   - Target Goal: Less than 130/80  - Volume status: Euvolemic  - Status: Blood pressure currently at goal  - Current antihypertensive regimen: Lisinopril 30 mg daily  - Changes: No changes to antihypertensive regimen today    # Type 2 diabetes  - Most recent HbA1c 7.3 11/2023 improved from 9.8 in 01/2023  - He is currently on metformin and Farxiga  - GFR more than 100, baseline creatinine around 0.8-0.9  - Previous UACR negative for albuminuria  - Check urinalysis and microscopy  - Check urine albumin to creatinine ratio  - Check urine protein to creatinine ratio    HISTORY OF PRESENT ILLNESS:  Requesting Physician: LARS Dorado    Mario Jenkins is a 48 y.o. male who was seen in nephrology office today for evaluation of hyponatremia.  He has history of diabetes for 2 to 3  years.  History of hypertension for 3 to 4 years.  He has history of anxiety and takes Lexapro which was started 6 months ago.  He drinks somewhere between 60 to 120 ounces of fluid on a daily basis.  He denies alcohol or beer intake.  He denies leg swelling.  He denies dyspnea.  He denies any trouble with urination during the daytime.  Sometimes he has difficulty with urination at night.    >> Medical history evaluation   - Diabetes: Yes for 2-3 years   - Hypertension: Yes for 3-4 years   - Age ? 55 years: No   - Family history of kidney disease: No   - Obesity or metabolic syndrome: Yes   - Prior kidney disease or dialysis: No  - Incidental hematuria in the past: No   - Urinary symptoms: No   - History of foamy or frothy urine: No   - History of nephrolithiasis: No  - Systemic diseases that might affect kidney: Psoriasis for 2-3 years   - History of use of medications that might affect renal function: Occasional NSAID use     PAST MEDICAL HISTORY:  Past Medical History:   Diagnosis Date    Diabetes ([facility])     Elevated ALT measurement 5/11/2022    Hypertension     Hyponatremia 5/24/2023    Leukocytosis 1/28/2020    Morbid obesity with BMI of 45.0-49.9, adult ([facility])        PAST SURGICAL HISTORY:  Past Surgical History:   Procedure Laterality Date    WISDOM TOOTH EXTRACTION         ALLERGIES:  No Known Allergies    SOCIAL HISTORY:  Social History     Substance and Sexual Activity   Alcohol Use No     Social History     Substance and Sexual Activity   Drug Use No     Social History     Tobacco Use   Smoking Status Former   Smokeless Tobacco Never   Tobacco Comments    Never smoker per [employer]       FAMILY HISTORY:  Family History   Problem Relation Age of Onset    No Known Problems Father     Hypertension Maternal Grandfather     Diabetes Maternal Grandfather     Heart disease Maternal Grandfather     Cancer Paternal Aunt     Diabetes Mother     Dementia Mother     Obesity Mother     Stroke Neg Hx         MEDICATIONS:    Current Outpatient Medications:     dapagliflozin (Farxiga) 10 MG tablet, Take 1 tablet (10 mg total) by mouth in the morning, Disp: 90 tablet, Rfl: 1    escitalopram (LEXAPRO) 10 mg tablet, Take 1 tablet (10 mg total) by mouth daily, Disp: 90 tablet, Rfl: 1    lisinopril (ZESTRIL) 30 mg tablet, TAKE 1 TABLET BY MOUTH EVERY DAY, Disp: 90 tablet, Rfl: 1    metFORMIN (GLUCOPHAGE) 1000 MG tablet, Take 1 tablet (1,000 mg total) by mouth 2 (two) times a day with meals, Disp: 180 tablet, Rfl: 0    Multiple Vitamin (Multivitamin Adult) TABS, Take 1 tablet by mouth daily, Disp: , Rfl:     niacin (NIASPAN) 750 MG CR tablet, Take 1 tablet (750 mg total) by mouth daily at bedtime, Disp: 90 tablet, Rfl: 1    rosuvastatin (CRESTOR) 5 mg tablet, Take 1 tablet (5 mg total) by mouth daily, Disp: 90 tablet, Rfl: 0    sitaGLIPtin (JANUVIA) 100 mg tablet, Take 1 tablet (100 mg total) by mouth daily, Disp: 90 tablet, Rfl: 1    TREMFYA subcutaneous injection, Inject 100 mg under the skin every 56 days , Disp: , Rfl:     ergocalciferol (VITAMIN D2) 50,000 units, Take 1 capsule (50,000 Units total) by mouth once a week (Patient not taking: Reported on 2/12/2024), Disp: 12 capsule, Rfl: 0    Omega-3 Fatty Acids (fish oil) 1,000 mg, Take 2 capsules (2,000 mg total) by mouth daily (Patient not taking: Reported on 2/12/2024), Disp: 30 capsule, Rfl: 3    REVIEW OF SYSTEMS:  Review of Systems   Constitutional:  Negative for chills and fever.   HENT:  Negative for ear pain and sore throat.    Eyes:  Negative for pain and visual disturbance.   Respiratory:  Negative for cough and shortness of breath.    Cardiovascular:  Negative for chest pain and palpitations.   Gastrointestinal:  Negative for abdominal pain and vomiting.   Genitourinary:  Negative for dysuria and hematuria.   Musculoskeletal:  Negative for arthralgias and back pain.   Skin:  Negative for color change and rash.   Neurological:  Negative for seizures and  "syncope.   All other systems reviewed and are negative.    All the systems were reviewed and were negative except as documented on the HPI.    PHYSICAL EXAMINATION:  /83   Pulse 90   Ht 5' 11\" (1.803 m)   Wt (!) 141 kg (311 lb)   BMI 43.38 kg/m²   Current Weight: Weight - Scale: (!) 141 kg (311 lb) Body mass index is 43.38 kg/m².  Physical Exam  Constitutional:       Appearance: Normal appearance.   HENT:      Head: Normocephalic and atraumatic.      Mouth/Throat:      Mouth: Mucous membranes are moist.      Pharynx: Oropharynx is clear.   Cardiovascular:      Rate and Rhythm: Normal rate and regular rhythm.      Pulses: Normal pulses.      Heart sounds: Normal heart sounds.   Pulmonary:      Effort: Pulmonary effort is normal.      Breath sounds: Normal breath sounds.   Abdominal:      General: Bowel sounds are normal.      Palpations: Abdomen is soft.   Musculoskeletal:         General: Normal range of motion.      Right lower leg: No edema.      Left lower leg: No edema.   Skin:     General: Skin is warm and dry.   Neurological:      General: No focal deficit present.      Mental Status: He is alert and oriented to person, place, and time. Mental status is at baseline.   Psychiatric:         Mood and Affect: Mood normal.         Behavior: Behavior normal.         Thought Content: Thought content normal.         Judgment: Judgment normal.         LABORATORY RESULTS:  Lab Results   Component Value Date     06/25/2016    K 4.2 11/22/2023     11/22/2023    CO2 25 11/22/2023    ANIONGAP 9 10/15/2014    BUN 19 11/22/2023    CREATININE 0.83 11/22/2023    GLUCOSE 97 10/15/2014    GLUF 161 (H) 11/22/2023    CALCIUM 9.8 11/22/2023    AST 26 11/22/2023    ALT 40 11/22/2023    ALKPHOS 68 11/22/2023    PROT 7.5 06/25/2016    BILITOT 0.6 06/25/2016    EGFR 102 11/22/2023     Lab Results   Component Value Date    WBC 9.43 11/22/2023    HGB 17.0 11/22/2023    HCT 50.8 (H) 11/22/2023    MCV 84 11/22/2023    "  11/22/2023     Lab Results   Component Value Date    CALCIUM 9.8 11/22/2023

## 2024-02-12 NOTE — PATIENT INSTRUCTIONS
You were evaluated in nephrology office today for low sodium level.  We will start with blood test and urine test for further evaluation of low sodium.  Based on the results, we will discuss management.  For now, you can restrict your fluid intake to somewhere between 60 to 80 ounces of fluid on a daily basis.  Given your history of diabetes, we will also check urine studies to rule out kidney disease.  We will recommend good blood pressure control and good control of diabetes to prevent kidney disease in future.

## 2024-02-13 DIAGNOSIS — E11.65 TYPE 2 DIABETES MELLITUS WITH HYPERGLYCEMIA, WITHOUT LONG-TERM CURRENT USE OF INSULIN (HCC): ICD-10-CM

## 2024-02-13 RX ORDER — SITAGLIPTIN 100 MG/1
100 TABLET, FILM COATED ORAL DAILY
Qty: 90 TABLET | Refills: 1 | Status: SHIPPED | OUTPATIENT
Start: 2024-02-13

## 2024-02-21 DIAGNOSIS — E11.65 TYPE 2 DIABETES MELLITUS WITH HYPERGLYCEMIA, WITHOUT LONG-TERM CURRENT USE OF INSULIN (HCC): ICD-10-CM

## 2024-02-21 PROBLEM — Z12.5 PROSTATE CANCER SCREENING: Status: RESOLVED | Noted: 2018-02-01 | Resolved: 2024-02-21

## 2024-02-21 PROBLEM — Z13.83 SCREENING FOR CARDIOVASCULAR, RESPIRATORY, AND GENITOURINARY DISEASES: Status: RESOLVED | Noted: 2018-02-01 | Resolved: 2024-02-21

## 2024-02-21 PROBLEM — Z13.6 SCREENING FOR CARDIOVASCULAR, RESPIRATORY, AND GENITOURINARY DISEASES: Status: RESOLVED | Noted: 2018-02-01 | Resolved: 2024-02-21

## 2024-02-21 PROBLEM — Z13.89 SCREENING FOR CARDIOVASCULAR, RESPIRATORY, AND GENITOURINARY DISEASES: Status: RESOLVED | Noted: 2018-02-01 | Resolved: 2024-02-21

## 2024-02-21 PROBLEM — Z00.00 HEALTHCARE MAINTENANCE: Status: RESOLVED | Noted: 2018-02-01 | Resolved: 2024-02-21

## 2024-03-05 DIAGNOSIS — E11.65 TYPE 2 DIABETES MELLITUS WITH HYPERGLYCEMIA, WITHOUT LONG-TERM CURRENT USE OF INSULIN (HCC): ICD-10-CM

## 2024-03-05 RX ORDER — DAPAGLIFLOZIN 10 MG/1
10 TABLET, FILM COATED ORAL DAILY
Qty: 90 TABLET | Refills: 1 | Status: SHIPPED | OUTPATIENT
Start: 2024-03-05

## 2024-03-16 ENCOUNTER — APPOINTMENT (OUTPATIENT)
Dept: LAB | Facility: CLINIC | Age: 49
End: 2024-03-16
Payer: COMMERCIAL

## 2024-03-16 DIAGNOSIS — E11.65 TYPE 2 DIABETES MELLITUS WITH HYPERGLYCEMIA, WITHOUT LONG-TERM CURRENT USE OF INSULIN (HCC): ICD-10-CM

## 2024-03-16 DIAGNOSIS — E66.01 MORBID OBESITY (HCC): ICD-10-CM

## 2024-03-16 DIAGNOSIS — R79.89 ABNORMAL CBC: ICD-10-CM

## 2024-03-16 DIAGNOSIS — I10 ESSENTIAL HYPERTENSION: ICD-10-CM

## 2024-03-16 DIAGNOSIS — R71.8 ELEVATED HEMATOCRIT: ICD-10-CM

## 2024-03-16 DIAGNOSIS — Z12.5 SCREENING FOR PROSTATE CANCER: ICD-10-CM

## 2024-03-16 DIAGNOSIS — E87.1 HYPONATREMIA: ICD-10-CM

## 2024-03-16 DIAGNOSIS — E11.9 TYPE 2 DIABETES MELLITUS WITHOUT COMPLICATION, WITHOUT LONG-TERM CURRENT USE OF INSULIN (HCC): ICD-10-CM

## 2024-03-16 DIAGNOSIS — E55.9 VITAMIN D DEFICIENCY: ICD-10-CM

## 2024-03-16 DIAGNOSIS — E78.2 MIXED HYPERLIPIDEMIA: ICD-10-CM

## 2024-03-16 LAB
25(OH)D3 SERPL-MCNC: 22.6 NG/ML (ref 30–100)
ALBUMIN SERPL BCP-MCNC: 4.5 G/DL (ref 3.5–5)
ALP SERPL-CCNC: 64 U/L (ref 34–104)
ALT SERPL W P-5'-P-CCNC: 37 U/L (ref 7–52)
ANION GAP SERPL CALCULATED.3IONS-SCNC: 7 MMOL/L (ref 4–13)
AST SERPL W P-5'-P-CCNC: 26 U/L (ref 13–39)
BASOPHILS # BLD AUTO: 0.07 THOUSANDS/ÂΜL (ref 0–0.1)
BASOPHILS NFR BLD AUTO: 1 % (ref 0–1)
BILIRUB SERPL-MCNC: 0.58 MG/DL (ref 0.2–1)
BUN SERPL-MCNC: 16 MG/DL (ref 5–25)
CALCIUM SERPL-MCNC: 9.5 MG/DL (ref 8.4–10.2)
CHLORIDE SERPL-SCNC: 99 MMOL/L (ref 96–108)
CHOLEST SERPL-MCNC: 142 MG/DL
CO2 SERPL-SCNC: 27 MMOL/L (ref 21–32)
CORTIS AM PEAK SERPL-MCNC: 9.8 UG/DL (ref 6.7–22.6)
CREAT SERPL-MCNC: 0.78 MG/DL (ref 0.6–1.3)
EOSINOPHIL # BLD AUTO: 0.21 THOUSAND/ÂΜL (ref 0–0.61)
EOSINOPHIL NFR BLD AUTO: 3 % (ref 0–6)
ERYTHROCYTE [DISTWIDTH] IN BLOOD BY AUTOMATED COUNT: 13.7 % (ref 11.6–15.1)
EST. AVERAGE GLUCOSE BLD GHB EST-MCNC: 169 MG/DL
FERRITIN SERPL-MCNC: 254 NG/ML (ref 24–336)
GFR SERPL CREATININE-BSD FRML MDRD: 105 ML/MIN/1.73SQ M
GLUCOSE P FAST SERPL-MCNC: 171 MG/DL (ref 65–99)
HBA1C MFR BLD: 7.5 %
HCT VFR BLD AUTO: 47.1 % (ref 36.5–49.3)
HDLC SERPL-MCNC: 38 MG/DL
HGB BLD-MCNC: 16 G/DL (ref 12–17)
IMM GRANULOCYTES # BLD AUTO: 0.02 THOUSAND/UL (ref 0–0.2)
IMM GRANULOCYTES NFR BLD AUTO: 0 % (ref 0–2)
IRON SATN MFR SERPL: 18 % (ref 15–50)
IRON SERPL-MCNC: 63 UG/DL (ref 50–212)
LDLC SERPL CALC-MCNC: 56 MG/DL (ref 0–100)
LYMPHOCYTES # BLD AUTO: 3.12 THOUSANDS/ÂΜL (ref 0.6–4.47)
LYMPHOCYTES NFR BLD AUTO: 41 % (ref 14–44)
MCH RBC QN AUTO: 28.1 PG (ref 26.8–34.3)
MCHC RBC AUTO-ENTMCNC: 34 G/DL (ref 31.4–37.4)
MCV RBC AUTO: 83 FL (ref 82–98)
MONOCYTES # BLD AUTO: 0.59 THOUSAND/ÂΜL (ref 0.17–1.22)
MONOCYTES NFR BLD AUTO: 8 % (ref 4–12)
NEUTROPHILS # BLD AUTO: 3.63 THOUSANDS/ÂΜL (ref 1.85–7.62)
NEUTS SEG NFR BLD AUTO: 47 % (ref 43–75)
NRBC BLD AUTO-RTO: 0 /100 WBCS
OSMOLALITY UR/SERPL-RTO: 294 MMOL/KG (ref 282–298)
PLATELET # BLD AUTO: 211 THOUSANDS/UL (ref 149–390)
PMV BLD AUTO: 8.9 FL (ref 8.9–12.7)
POTASSIUM SERPL-SCNC: 4.5 MMOL/L (ref 3.5–5.3)
PROT SERPL-MCNC: 7.2 G/DL (ref 6.4–8.4)
PSA SERPL-MCNC: 0.23 NG/ML (ref 0–4)
RBC # BLD AUTO: 5.69 MILLION/UL (ref 3.88–5.62)
SODIUM SERPL-SCNC: 133 MMOL/L (ref 135–147)
TIBC SERPL-MCNC: 360 UG/DL (ref 250–450)
TRIGL SERPL-MCNC: 242 MG/DL
TSH SERPL DL<=0.05 MIU/L-ACNC: 1.59 UIU/ML (ref 0.45–4.5)
UIBC SERPL-MCNC: 297 UG/DL (ref 155–355)
URATE SERPL-MCNC: 5.9 MG/DL (ref 3.5–8.5)
WBC # BLD AUTO: 7.64 THOUSAND/UL (ref 4.31–10.16)

## 2024-03-16 PROCEDURE — 84550 ASSAY OF BLOOD/URIC ACID: CPT

## 2024-03-16 PROCEDURE — 83550 IRON BINDING TEST: CPT

## 2024-03-16 PROCEDURE — 83930 ASSAY OF BLOOD OSMOLALITY: CPT

## 2024-03-16 PROCEDURE — 82533 TOTAL CORTISOL: CPT

## 2024-03-16 PROCEDURE — 83540 ASSAY OF IRON: CPT

## 2024-03-16 PROCEDURE — 83036 HEMOGLOBIN GLYCOSYLATED A1C: CPT

## 2024-03-16 PROCEDURE — 82306 VITAMIN D 25 HYDROXY: CPT

## 2024-03-16 PROCEDURE — 82728 ASSAY OF FERRITIN: CPT

## 2024-03-16 PROCEDURE — G0103 PSA SCREENING: HCPCS

## 2024-03-16 PROCEDURE — 84443 ASSAY THYROID STIM HORMONE: CPT

## 2024-03-16 PROCEDURE — 80061 LIPID PANEL: CPT

## 2024-03-16 PROCEDURE — 85025 COMPLETE CBC W/AUTO DIFF WBC: CPT

## 2024-03-16 PROCEDURE — 80053 COMPREHEN METABOLIC PANEL: CPT

## 2024-03-19 LAB — EPO SERPL-ACNC: 11.3 MIU/ML (ref 2.6–18.5)

## 2024-03-20 ENCOUNTER — TELEPHONE (OUTPATIENT)
Dept: HEMATOLOGY ONCOLOGY | Facility: CLINIC | Age: 49
End: 2024-03-20

## 2024-03-20 NOTE — TELEPHONE ENCOUNTER
Appointment Schedule   Who are you speaking with? Patient   If it is not the patient, are they listed on an active communication consent form? N/A   Which provider is the appointment scheduled with? Vanessa Hightower PA-C   At which location is the appointment scheduled for? Virtual   When is the appointment scheduled?  Please list date and time 4/19/24 2:30pm   What is the reason for this appointment? 1 month follow up   Did patient voice understanding of the details of this appointment? Yes   Was the no show policy reviewed with patient? Yes       Patient had a work emergency and is unable to attend his virtual appointment today with Vanessa Hightower at 11:00am.

## 2024-04-03 ENCOUNTER — TELEPHONE (OUTPATIENT)
Dept: NEPHROLOGY | Facility: CLINIC | Age: 49
End: 2024-04-03

## 2024-04-03 NOTE — TELEPHONE ENCOUNTER
----- Message from Greg Sow MD sent at 4/3/2024  1:35 PM EDT -----  Regarding: Urine tests  Please advise the patient to complete urine tests that were ordered at the time of consultation.  Thank you.

## 2024-04-08 ENCOUNTER — APPOINTMENT (OUTPATIENT)
Dept: LAB | Facility: CLINIC | Age: 49
End: 2024-04-08
Payer: COMMERCIAL

## 2024-04-08 DIAGNOSIS — Z79.899 ENCOUNTER FOR LONG-TERM (CURRENT) USE OF OTHER MEDICATIONS: ICD-10-CM

## 2024-04-08 DIAGNOSIS — Z01.89 RADIOLOGICAL EXAMINATION, NOT ELSEWHERE CLASSIFIED: ICD-10-CM

## 2024-04-08 DIAGNOSIS — L40.0 PSORIASIS VULGARIS: ICD-10-CM

## 2024-04-08 LAB
ALBUMIN SERPL BCP-MCNC: 4.4 G/DL (ref 3.5–5)
ALP SERPL-CCNC: 62 U/L (ref 34–104)
ALT SERPL W P-5'-P-CCNC: 38 U/L (ref 7–52)
ANION GAP SERPL CALCULATED.3IONS-SCNC: 6 MMOL/L (ref 4–13)
AST SERPL W P-5'-P-CCNC: 25 U/L (ref 13–39)
BACTERIA UR QL AUTO: ABNORMAL /HPF
BASOPHILS # BLD AUTO: 0.07 THOUSANDS/ÂΜL (ref 0–0.1)
BASOPHILS NFR BLD AUTO: 1 % (ref 0–1)
BILIRUB SERPL-MCNC: 0.97 MG/DL (ref 0.2–1)
BILIRUB UR QL STRIP: NEGATIVE
BUN SERPL-MCNC: 22 MG/DL (ref 5–25)
CALCIUM SERPL-MCNC: 9.7 MG/DL (ref 8.4–10.2)
CHLORIDE SERPL-SCNC: 101 MMOL/L (ref 96–108)
CLARITY UR: CLEAR
CO2 SERPL-SCNC: 26 MMOL/L (ref 21–32)
COLOR UR: ABNORMAL
CREAT SERPL-MCNC: 0.84 MG/DL (ref 0.6–1.3)
CREAT UR-MCNC: 64.2 MG/DL
CREAT UR-MCNC: 64.7 MG/DL
EOSINOPHIL # BLD AUTO: 0.22 THOUSAND/ÂΜL (ref 0–0.61)
EOSINOPHIL NFR BLD AUTO: 3 % (ref 0–6)
ERYTHROCYTE [DISTWIDTH] IN BLOOD BY AUTOMATED COUNT: 13.8 % (ref 11.6–15.1)
GFR SERPL CREATININE-BSD FRML MDRD: 102 ML/MIN/1.73SQ M
GLUCOSE SERPL-MCNC: 180 MG/DL (ref 65–140)
GLUCOSE UR STRIP-MCNC: ABNORMAL MG/DL
HCT VFR BLD AUTO: 47 % (ref 36.5–49.3)
HGB BLD-MCNC: 15.8 G/DL (ref 12–17)
HGB UR QL STRIP.AUTO: NEGATIVE
IMM GRANULOCYTES # BLD AUTO: 0.02 THOUSAND/UL (ref 0–0.2)
IMM GRANULOCYTES NFR BLD AUTO: 0 % (ref 0–2)
KETONES UR STRIP-MCNC: NEGATIVE MG/DL
LEUKOCYTE ESTERASE UR QL STRIP: ABNORMAL
LYMPHOCYTES # BLD AUTO: 3.89 THOUSANDS/ÂΜL (ref 0.6–4.47)
LYMPHOCYTES NFR BLD AUTO: 44 % (ref 14–44)
MCH RBC QN AUTO: 28 PG (ref 26.8–34.3)
MCHC RBC AUTO-ENTMCNC: 33.6 G/DL (ref 31.4–37.4)
MCV RBC AUTO: 83 FL (ref 82–98)
MICROALBUMIN UR-MCNC: <7 MG/L
MICROALBUMIN/CREAT 24H UR: <11 MG/G CREATININE (ref 0–30)
MONOCYTES # BLD AUTO: 0.84 THOUSAND/ÂΜL (ref 0.17–1.22)
MONOCYTES NFR BLD AUTO: 9 % (ref 4–12)
NEUTROPHILS # BLD AUTO: 3.88 THOUSANDS/ÂΜL (ref 1.85–7.62)
NEUTS SEG NFR BLD AUTO: 43 % (ref 43–75)
NITRITE UR QL STRIP: NEGATIVE
NON-SQ EPI CELLS URNS QL MICRO: ABNORMAL /HPF
NRBC BLD AUTO-RTO: 0 /100 WBCS
PH UR STRIP.AUTO: 5.5 [PH]
PLATELET # BLD AUTO: 219 THOUSANDS/UL (ref 149–390)
PMV BLD AUTO: 9 FL (ref 8.9–12.7)
POTASSIUM SERPL-SCNC: 4.5 MMOL/L (ref 3.5–5.3)
PROT SERPL-MCNC: 7.3 G/DL (ref 6.4–8.4)
PROT UR STRIP-MCNC: NEGATIVE MG/DL
PROT UR-MCNC: <4 MG/DL
RBC # BLD AUTO: 5.65 MILLION/UL (ref 3.88–5.62)
RBC #/AREA URNS AUTO: ABNORMAL /HPF
SODIUM SERPL-SCNC: 133 MMOL/L (ref 135–147)
SP GR UR STRIP.AUTO: 1.02 (ref 1–1.03)
UROBILINOGEN UR STRIP-ACNC: <2 MG/DL
WBC # BLD AUTO: 8.92 THOUSAND/UL (ref 4.31–10.16)
WBC #/AREA URNS AUTO: ABNORMAL /HPF

## 2024-04-08 PROCEDURE — 82570 ASSAY OF URINE CREATININE: CPT

## 2024-04-08 PROCEDURE — 86480 TB TEST CELL IMMUN MEASURE: CPT

## 2024-04-08 PROCEDURE — 36415 COLL VENOUS BLD VENIPUNCTURE: CPT

## 2024-04-08 PROCEDURE — 84156 ASSAY OF PROTEIN URINE: CPT

## 2024-04-08 PROCEDURE — 85025 COMPLETE CBC W/AUTO DIFF WBC: CPT

## 2024-04-08 PROCEDURE — 82043 UR ALBUMIN QUANTITATIVE: CPT

## 2024-04-08 PROCEDURE — 80053 COMPREHEN METABOLIC PANEL: CPT

## 2024-04-08 PROCEDURE — 81001 URINALYSIS AUTO W/SCOPE: CPT

## 2024-04-09 LAB
GAMMA INTERFERON BACKGROUND BLD IA-ACNC: 0.04 IU/ML
M TB IFN-G BLD-IMP: NEGATIVE
M TB IFN-G CD4+ BCKGRND COR BLD-ACNC: 0.01 IU/ML
M TB IFN-G CD4+ BCKGRND COR BLD-ACNC: 0.02 IU/ML
MITOGEN IGNF BCKGRD COR BLD-ACNC: 9.96 IU/ML

## 2024-04-10 ENCOUNTER — TELEPHONE (OUTPATIENT)
Age: 49
End: 2024-04-10

## 2024-04-10 ENCOUNTER — OFFICE VISIT (OUTPATIENT)
Dept: FAMILY MEDICINE CLINIC | Facility: CLINIC | Age: 49
End: 2024-04-10

## 2024-04-10 VITALS
HEIGHT: 71 IN | HEART RATE: 80 BPM | SYSTOLIC BLOOD PRESSURE: 130 MMHG | RESPIRATION RATE: 16 BRPM | DIASTOLIC BLOOD PRESSURE: 70 MMHG | OXYGEN SATURATION: 98 % | BODY MASS INDEX: 43.82 KG/M2 | WEIGHT: 313 LBS

## 2024-04-10 DIAGNOSIS — E78.2 MIXED HYPERLIPIDEMIA: ICD-10-CM

## 2024-04-10 DIAGNOSIS — M54.9 MID BACK PAIN: ICD-10-CM

## 2024-04-10 DIAGNOSIS — I10 ESSENTIAL HYPERTENSION: ICD-10-CM

## 2024-04-10 DIAGNOSIS — E11.65 TYPE 2 DIABETES MELLITUS WITH HYPERGLYCEMIA, WITHOUT LONG-TERM CURRENT USE OF INSULIN (HCC): Primary | ICD-10-CM

## 2024-04-10 DIAGNOSIS — F41.9 ANXIETY: ICD-10-CM

## 2024-04-10 DIAGNOSIS — E87.1 HYPONATREMIA: ICD-10-CM

## 2024-04-10 DIAGNOSIS — E78.1 HYPERTRIGLYCERIDEMIA: ICD-10-CM

## 2024-04-10 PROBLEM — L30.9 DERMATITIS: Status: RESOLVED | Noted: 2019-01-22 | Resolved: 2024-04-10

## 2024-04-10 PROBLEM — Z23 IMMUNIZATION DUE: Status: RESOLVED | Noted: 2018-02-01 | Resolved: 2024-04-10

## 2024-04-10 RX ORDER — ROSUVASTATIN CALCIUM 5 MG/1
5 TABLET, COATED ORAL DAILY
Qty: 90 TABLET | Refills: 1 | Status: SHIPPED | OUTPATIENT
Start: 2024-04-10

## 2024-04-10 RX ORDER — BLOOD-GLUCOSE METER
EACH MISCELLANEOUS
Qty: 1 KIT | Refills: 0 | Status: SHIPPED | OUTPATIENT
Start: 2024-04-10

## 2024-04-10 RX ORDER — LANCETS 33 GAUGE
EACH MISCELLANEOUS
Qty: 100 EACH | Refills: 3 | Status: SHIPPED | OUTPATIENT
Start: 2024-04-10

## 2024-04-10 RX ORDER — BLOOD SUGAR DIAGNOSTIC
STRIP MISCELLANEOUS
Qty: 100 EACH | Refills: 3 | Status: SHIPPED | OUTPATIENT
Start: 2024-04-10

## 2024-04-10 RX ORDER — FENOFIBRATE 54 MG/1
54 TABLET ORAL DAILY
Qty: 90 TABLET | Refills: 1 | Status: SHIPPED | OUTPATIENT
Start: 2024-04-10

## 2024-04-10 NOTE — TELEPHONE ENCOUNTER
Patient calling to find out if [employer] can write a letter for him. He stated that he has back issues and the letter  should express this. He needs the letter to show his employer so that they can get a better work chair for his back.   Please advise and call patient back when ready.  Nighat Castro

## 2024-04-10 NOTE — PROGRESS NOTES
Name: Mario Jenkins      : 1975      MRN: [id number]  Encounter Provider: LARS Dorado  Encounter Date: 4/10/2024   Encounter department: [facility]    Assessment & Plan     1. Type 2 diabetes mellitus with hyperglycemia, without long-term current use of insulin (HCC)  -     Diabetic foot exam; Future  -     Blood Glucose Monitoring Suppl (ONE TOUCH ULTRA 2) w/Device KIT; Check blood sugars once daily. Please substitute with appropriate alternative as covered by patient's insurance. Dx: E11.65  -     glucose blood (OneTouch Ultra) test strip; Check blood sugars once daily. Please substitute with appropriate alternative as covered by patient's insurance. Dx: E11.65  -     OneTouch Delica Lancets 33G MISC; Check blood sugars once daily. Please substitute with appropriate alternative as covered by patient's insurance. Dx: E11.65    Hemoglobin A1c is 7.5.   Patient reports that he has not been exercising recently and has been stress eating and eating larger portions.   Patient encouraged to eat a healthy low fat/diabetic diet.   Glucose monitoring supplies ordered.   Patient would like to work on his diet before adding another medication for DM.   Patient takes metformin 1,000mg BID, Januvia 100mg daily, and farxiga 10mg daily.   Continue current medications.   Repeat hemoglobin A1c in 3 months.   Dry skin noted on diabetic foot exam.   Patient instructed to use lotion on his feet for sensitive skin.     2. Essential hypertension  Continue lisinopril 30mg daily.     3. Anxiety  Patient takes lexapro 10mg daily and it helps.   Denies any depression or suicidal thoughts.   Continue lexapro 10mg daily.     4. Hypertriglyceridemia  -     fenofibrate (TRICOR) 54 MG tablet; Take 1 tablet (54 mg total) by mouth daily    Triglycerides are 242.   Patient takes crestor 5mg daily and niacin 750mg daily.   Continue crestor 5mg daily.   D/c niacin.  Fenofibrate 54mg daily prescribed.  Medication information and side effects reviewed.   Patient encouraged to eat a healthy low fat/diabetic diet.   Repeat lipid panel in 3 months.     5. BMI 40.0-44.9, adult (HCC)  Patient encouraged to eat a healthy low fat/diabetic diet.     6. Mixed hyperlipidemia  -     fenofibrate (TRICOR) 54 MG tablet; Take 1 tablet (54 mg total) by mouth daily  -     rosuvastatin (CRESTOR) 5 mg tablet; Take 1 tablet (5 mg total) by mouth daily    Total cholesterol 142, LDL 56, and triglycerides 242.   Continue crestor 5mg daily.   Fenofibrate 54mg daily prescribed. Medication information and side effects reviewed.     7. Hyponatremia.   Continue to follow-up with nephrology.     Reviewed lab results with the patient.   Patient instructed to follow-up in 3 months or sooner prn.            Subjective      Patient is here for a follow-up for chronic medical conditions.   Patient is here for a follow-up for type 2 DM and obesity. Patient reports that he has been stress eating recently and eating larger portions. Patient reports that he is taking his medications as prescribed. Denies any Has or vomiting. Patient reports that he was having positional vertigo but it seems to have subsided over the past few days. Patient reports that he has not been exercising on a regular basis.   Patient is here for a follow-up for HTN. Denies any chest pain or SOB. Patient takes lisinopril 30mg daily.   Patient followed up with nephrology for hyponatremia.   Patient takes lexapro 10mg daily for anxiety and it helps. Denies any depression or suicidal thoughts.   Patient takes crestor 5mg daily and niacin for hyperlipidemia.             Review of Systems   Constitutional:  Negative for chills and fever.   HENT:  Negative for congestion, ear pain and sore throat.    Respiratory:  Negative for cough, shortness of breath and wheezing.    Cardiovascular:  Negative for chest pain.   Gastrointestinal:  Negative for abdominal pain, blood in stool, diarrhea,  "nausea and vomiting.   Genitourinary:  Negative for dysuria, frequency and hematuria.   Skin:  Negative for rash.   Neurological:  Negative for seizures, syncope and headaches.   Psychiatric/Behavioral:  Negative for suicidal ideas.         As noted in HPI.        Current Outpatient Medications on File Prior to Visit   Medication Sig    dapagliflozin (Farxiga) 10 MG tablet Take 1 tablet (10 mg total) by mouth in the morning    ergocalciferol (VITAMIN D2) 50,000 units Take 1 capsule (50,000 Units total) by mouth once a week (Patient not taking: Reported on 2/12/2024)    escitalopram (LEXAPRO) 10 mg tablet Take 1 tablet (10 mg total) by mouth daily    Januvia 100 MG tablet TAKE 1 TABLET BY MOUTH EVERY DAY    lisinopril (ZESTRIL) 30 mg tablet TAKE 1 TABLET BY MOUTH EVERY DAY    metFORMIN (GLUCOPHAGE) 1000 MG tablet Take 1 tablet (1,000 mg total) by mouth 2 (two) times a day with meals    Multiple Vitamin (Multivitamin Adult) TABS Take 1 tablet by mouth daily    Omega-3 Fatty Acids (fish oil) 1,000 mg Take 2 capsules (2,000 mg total) by mouth daily (Patient not taking: Reported on 2/12/2024)    TREMFYA subcutaneous injection Inject 100 mg under the skin every 56 days     [DISCONTINUED] niacin (NIASPAN) 750 MG CR tablet Take 1 tablet (750 mg total) by mouth daily at bedtime    [DISCONTINUED] rosuvastatin (CRESTOR) 5 mg tablet Take 1 tablet (5 mg total) by mouth daily       Objective     /70   Pulse 80   Resp 16   Ht 5' 11\" (1.803 m)   Wt (!) 142 kg (313 lb)   SpO2 98%   BMI 43.65 kg/m²     Physical Exam  Vitals reviewed.   Constitutional:       General: He is not in acute distress.     Appearance: He is obese. He is not ill-appearing or diaphoretic.   HENT:      Right Ear: External ear normal.      Left Ear: External ear normal.      Nose: Nose normal.      Mouth/Throat:      Mouth: Mucous membranes are moist.      Pharynx: Oropharynx is clear. No oropharyngeal exudate or posterior oropharyngeal erythema. "   Eyes:      Conjunctiva/sclera: Conjunctivae normal.      Pupils: Pupils are equal, round, and reactive to light.   Cardiovascular:      Rate and Rhythm: Normal rate and regular rhythm.      Pulses: Normal pulses. no weak pulses.           Dorsalis pedis pulses are 2+ on the right side and 2+ on the left side.      Heart sounds: Normal heart sounds.      Comments: No edema noted.   Pulmonary:      Effort: Pulmonary effort is normal. No respiratory distress.      Breath sounds: Normal breath sounds. No wheezing.   Feet:      Right foot:      Skin integrity: Dry skin present. No ulcer, skin breakdown, erythema, warmth or callus.      Left foot:      Skin integrity: Dry skin present. No ulcer, skin breakdown, erythema, warmth or callus.   Skin:     Findings: No rash.   Neurological:      Mental Status: He is alert and oriented to person, place, and time.   Psychiatric:         Mood and Affect: Mood normal.         Diabetic Foot Exam    Patient's shoes and socks removed.    Right Foot/Ankle   Right Foot Inspection  Skin Exam: skin intact and dry skin. No warmth, no callus, no erythema, no maceration, no abnormal color, no pre-ulcer, no ulcer and no callus.     Toe Exam: ROM and strength within normal limits. No swelling, no tenderness, erythema and  no right toe deformity    Sensory   Monofilament testing: intact    Vascular  Capillary refills: < 3 seconds  The right DP pulse is 2+.     Left Foot/Ankle  Left Foot Inspection  Skin Exam: skin intact and dry skin. No warmth, no erythema, no maceration, normal color, no pre-ulcer, no ulcer and no callus.     Toe Exam: ROM and strength within normal limits. No swelling, no tenderness, no erythema and no left toe deformity.     Sensory   Monofilament testing: intact    Vascular  Capillary refills: < 3 seconds  The left DP pulse is 2+.     Assign Risk Category  No deformity present  No loss of protective sensation  No weak pulses  Risk: 0    LARS Dorado

## 2024-04-15 ENCOUNTER — TELEPHONE (OUTPATIENT)
Dept: NEPHROLOGY | Facility: CLINIC | Age: 49
End: 2024-04-15

## 2024-04-15 ENCOUNTER — TELEPHONE (OUTPATIENT)
Dept: FAMILY MEDICINE CLINIC | Facility: CLINIC | Age: 49
End: 2024-04-15

## 2024-04-15 NOTE — TELEPHONE ENCOUNTER
----- Message from Greg Sow MD sent at 4/12/2024  4:19 PM EDT -----  Regarding: Urine results  Urine labs reviewed.  No blood or protein in the urine which is reassuring.  Glucose is present in the urine which is expected due to Farxiga and this is how Farxiga functions.  Continue Farxiga.  No changes to medications from nephrology perspective.  However urine sodium and urine osmolality that were ordered for evaluation of low sodium are still pending.  Can you please have the patient get urine osmolality and urine sodium checked.

## 2024-04-15 NOTE — TELEPHONE ENCOUNTER
Left pt a message regarding the Disability forms Negra received. Negra is unable to fill them out he does not qualify for disability.

## 2024-04-15 NOTE — TELEPHONE ENCOUNTER
Left voicemail for the patient relaying the message above. Requested a call back to verify receipt.

## 2024-04-16 NOTE — TELEPHONE ENCOUNTER
Patient called and  stated this is not a disability claim he needs the form so that he can have a chair for work due to his back issues.

## 2024-04-17 NOTE — TELEPHONE ENCOUNTER
Called and spoke to the patient to relay the message above. Patient expressed understanding and had no further questions or concerns at this time. Agreeable to further urine tests.

## 2024-04-18 ENCOUNTER — OFFICE VISIT (OUTPATIENT)
Dept: FAMILY MEDICINE CLINIC | Facility: CLINIC | Age: 49
End: 2024-04-18
Payer: COMMERCIAL

## 2024-04-18 VITALS
WEIGHT: 314 LBS | BODY MASS INDEX: 43.96 KG/M2 | HEART RATE: 89 BPM | HEIGHT: 71 IN | RESPIRATION RATE: 16 BRPM | SYSTOLIC BLOOD PRESSURE: 126 MMHG | OXYGEN SATURATION: 98 % | DIASTOLIC BLOOD PRESSURE: 80 MMHG

## 2024-04-18 DIAGNOSIS — M54.9 MID BACK PAIN: Primary | ICD-10-CM

## 2024-04-18 PROCEDURE — 99213 OFFICE O/P EST LOW 20 MIN: CPT | Performed by: NURSE PRACTITIONER

## 2024-04-18 NOTE — PROGRESS NOTES
Name: Mario Jenkins      : 1975      MRN: [id number]  Encounter Provider: LARS Dorado  Encounter Date: 2024   Encounter department: [facility]    Assessment & Plan     1. Mid back pain  -     Ambulatory Referral to Chiropractic; Future        Patient reports occasional mid back pain for years.   Denies any injury.   Denies any numbness or tingling.   Patient reports that the pain is worse when he is sitting.   Patient reports that he sits for 7 hours a day at least for work.   Patient would like a special chair at work.   Patient has done physical therapy in the past.   Patient referred to a chiropractor for further evaluation and treatment.   Discussed form for work chair with the patient.     Subjective      Patient reports occasional mid back pain for years.   Denies any injury.   Denies any numbness or tingling.   Patient reports that the pain is worse when he is sitting.   Patient reports that he takes advil prn for the pain.   Patient reports that he sits for 7 hours a day at least for work.   Patient has done physical therapy for the pain the past.   Patient reports that he would like a special chair at work.       Review of Systems   Constitutional:  Negative for chills and fever.   HENT:  Negative for congestion, ear pain, sinus pressure and sore throat.    Respiratory:  Negative for cough, shortness of breath and wheezing.    Cardiovascular:  Negative for chest pain and palpitations.   Gastrointestinal:  Negative for abdominal pain, diarrhea, nausea and vomiting.   Musculoskeletal:         As noted in HPI.    Skin:  Negative for rash.   Neurological:  Negative for dizziness, seizures, syncope and light-headedness.       Current Outpatient Medications on File Prior to Visit   Medication Sig    Blood Glucose Monitoring Suppl (ONE TOUCH ULTRA 2) w/Device KIT Check blood sugars once daily. Please substitute with appropriate alternative as covered by patient's  "insurance. Dx: E11.65    dapagliflozin (Farxiga) 10 MG tablet Take 1 tablet (10 mg total) by mouth in the morning    escitalopram (LEXAPRO) 10 mg tablet Take 1 tablet (10 mg total) by mouth daily    fenofibrate (TRICOR) 54 MG tablet Take 1 tablet (54 mg total) by mouth daily    glucose blood (OneTouch Ultra) test strip Check blood sugars once daily. Please substitute with appropriate alternative as covered by patient's insurance. Dx: E11.65    Januvia 100 MG tablet TAKE 1 TABLET BY MOUTH EVERY DAY    lisinopril (ZESTRIL) 30 mg tablet TAKE 1 TABLET BY MOUTH EVERY DAY    metFORMIN (GLUCOPHAGE) 1000 MG tablet Take 1 tablet (1,000 mg total) by mouth 2 (two) times a day with meals    Multiple Vitamin (Multivitamin Adult) TABS Take 1 tablet by mouth daily    OneTouch Delica Lancets 33G MISC Check blood sugars once daily. Please substitute with appropriate alternative as covered by patient's insurance. Dx: E11.65    rosuvastatin (CRESTOR) 5 mg tablet Take 1 tablet (5 mg total) by mouth daily    TREMFYA subcutaneous injection Inject 100 mg under the skin every 56 days     ergocalciferol (VITAMIN D2) 50,000 units Take 1 capsule (50,000 Units total) by mouth once a week (Patient not taking: Reported on 2/12/2024)    Omega-3 Fatty Acids (fish oil) 1,000 mg Take 2 capsules (2,000 mg total) by mouth daily (Patient not taking: Reported on 2/12/2024)       Objective     /80 (BP Location: Right arm, Patient Position: Sitting, Cuff Size: Large)   Pulse 89   Resp 16   Ht 5' 11\" (1.803 m)   Wt (!) 142 kg (314 lb)   SpO2 98%   BMI 43.79 kg/m²     Physical Exam  Vitals reviewed.   Constitutional:       General: He is not in acute distress.     Appearance: He is obese. He is not ill-appearing or diaphoretic.   Cardiovascular:      Rate and Rhythm: Normal rate and regular rhythm.      Pulses: Normal pulses.      Heart sounds: Normal heart sounds.   Pulmonary:      Effort: Pulmonary effort is normal. No respiratory distress.    "   Breath sounds: Normal breath sounds. No wheezing.   Musculoskeletal:      Comments: No tenderness noted on palpation of the mid back.    Skin:     Findings: No rash.   Neurological:      Mental Status: He is alert and oriented to person, place, and time.   Psychiatric:         Mood and Affect: Mood normal.       LARS Dorado

## 2024-04-19 ENCOUNTER — TELEMEDICINE (OUTPATIENT)
Dept: HEMATOLOGY ONCOLOGY | Facility: CLINIC | Age: 49
End: 2024-04-19
Payer: COMMERCIAL

## 2024-04-19 DIAGNOSIS — R79.89 ABNORMAL CBC: Primary | ICD-10-CM

## 2024-04-19 DIAGNOSIS — R71.8 ELEVATED HEMATOCRIT: ICD-10-CM

## 2024-04-19 PROCEDURE — 99212 OFFICE O/P EST SF 10 MIN: CPT | Performed by: PHYSICIAN ASSISTANT

## 2024-04-19 NOTE — PROGRESS NOTES
Virtual Regular Visit    04/19/24         Patient: Mario Jenkins    Provider: Vanessa Hightower PA-C  Provider located at [address] [facility] [address] [facility]  [place] [id number]      A/P:  Mild elevation of hematocrit 46-51 since at least 2020. No symptoms of primary polycythemia.   Abd u/s requested but not drawn.  JAK2 with reflex requested but not drawn    3/16/24 iron saturation, ferritin 254, epo 11. 4.  Hgb 16, HCT 47.1.  WBC 7.64, normal differential, platelet count 211    Given normalization of HCT and no evidence of erythropoietin deficiency, this was likely related to dehydration, other transient cause.    At this time, recommend ongoing surveillance with PCP.  If hematocrit were to again elevate and be persistent, we will see him back.  Otherwise as needed              HPI: Mario Jenkins is a 48 y.o. seen for initial consultation 2/6/2024 at the referral of LARS Dorado regarding elevated hematocrit.     Past medical history of diabetes, hypertension, osteoarthritis, seborrheic dermatitis, plaque psoriasis, obesity, hypertriglyceridemia, vitamin D deficiency, hyponatremia, anxiety.     8/21/23 hematocrit 49     November 22, 2023 hemoglobin 17, hematocrit 50.8,, MCV 84, white blood cell count 9.4, 45% neutrophils, 43% lymphocytes, 8% monocytes, platelets 251  CMP normal with the exception of elevated glucose     He lost approximately 50 pounds 2 years ago through diet modifications.     Appetite has been stable. Does not bruise or bleed easily. He does occasionally headaches if he eats white carbs. Denies any pruritus after shower. No neuropathy, dizziness.     He does snore occasionally. This has improved since he is lost weight.     He does drink at least 64 ounces of water daily. He does not smoke or take testosterone.        Interval History      Review of Systems   Constitutional:  Negative for activity change, appetite  change, chills, diaphoresis, fatigue, fever and unexpected weight change.   HENT:  Negative for congestion, dental problem, facial swelling, hearing loss, mouth sores, nosebleeds, postnasal drip, rhinorrhea, sore throat, trouble swallowing and voice change.    Eyes:  Negative for photophobia, pain, discharge, redness, itching and visual disturbance.   Respiratory:  Negative for cough, choking, chest tightness, shortness of breath and wheezing.    Cardiovascular:  Negative for chest pain, palpitations and leg swelling.   Gastrointestinal:  Negative for abdominal distention, abdominal pain, anal bleeding, blood in stool, constipation, diarrhea, nausea, rectal pain and vomiting.   Endocrine: Negative for cold intolerance and heat intolerance.   Genitourinary:  Negative for decreased urine volume, difficulty urinating, dysuria, flank pain, frequency, hematuria and urgency.   Musculoskeletal:  Negative for arthralgias, back pain, gait problem, joint swelling, myalgias, neck pain and neck stiffness.   Skin:  Negative for color change, pallor, rash and wound.   Allergic/Immunologic: Negative for immunocompromised state.   Neurological:  Negative for dizziness, tremors, seizures, syncope, facial asymmetry, speech difficulty, weakness, light-headedness, numbness and headaches.   Hematological:  Negative for adenopathy. Does not bruise/bleed easily.   Psychiatric/Behavioral:  Negative for agitation, confusion, decreased concentration, dysphoric mood and sleep disturbance. The patient is not nervous/anxious.    All other systems reviewed and are negative.       Physical Exam  Constitutional:       Appearance: He is well-developed.   HENT:      Head: Normocephalic and atraumatic.   Cardiovascular:      Rate and Rhythm: Normal rate.   Pulmonary:      Effort: Pulmonary effort is normal.   Abdominal:      Palpations: Abdomen is soft.   Neurological:      General: No focal deficit present.      Mental Status: He is alert.    Psychiatric:         Behavior: Behavior normal.          Past Medical History:  No date: Diabetes (HCC)  5/11/2022: Elevated ALT measurement  No date: Hypertension  5/24/2023: Hyponatremia  1/28/2020: Leukocytosis  No date: Morbid obesity with BMI of 45.0-49.9, adult (HCC)     Past Surgical History:  No date: WISDOM TOOTH EXTRACTION     Current Outpatient Medications on File Prior to Visit   Medication Sig Dispense Refill    Blood Glucose Monitoring Suppl (ONE TOUCH ULTRA 2) w/Device KIT Check blood sugars once daily. Please substitute with appropriate alternative as covered by patient's insurance. Dx: E11.65 1 kit 0    dapagliflozin (Farxiga) 10 MG tablet Take 1 tablet (10 mg total) by mouth in the morning 90 tablet 1    ergocalciferol (VITAMIN D2) 50,000 units Take 1 capsule (50,000 Units total) by mouth once a week (Patient not taking: Reported on 2/12/2024) 12 capsule 0    escitalopram (LEXAPRO) 10 mg tablet Take 1 tablet (10 mg total) by mouth daily 90 tablet 1    fenofibrate (TRICOR) 54 MG tablet Take 1 tablet (54 mg total) by mouth daily 90 tablet 1    glucose blood (OneTouch Ultra) test strip Check blood sugars once daily. Please substitute with appropriate alternative as covered by patient's insurance. Dx: E11.65 100 each 3    Januvia 100 MG tablet TAKE 1 TABLET BY MOUTH EVERY DAY 90 tablet 1    lisinopril (ZESTRIL) 30 mg tablet TAKE 1 TABLET BY MOUTH EVERY DAY 90 tablet 1    metFORMIN (GLUCOPHAGE) 1000 MG tablet Take 1 tablet (1,000 mg total) by mouth 2 (two) times a day with meals 180 tablet 1    Multiple Vitamin (Multivitamin Adult) TABS Take 1 tablet by mouth daily      Omega-3 Fatty Acids (fish oil) 1,000 mg Take 2 capsules (2,000 mg total) by mouth daily (Patient not taking: Reported on 2/12/2024) 30 capsule 3    OneTouch Delica Lancets 33G MISC Check blood sugars once daily. Please substitute with appropriate alternative as covered by patient's insurance. Dx: E11.65 100 each 3    rosuvastatin  (CRESTOR) 5 mg tablet Take 1 tablet (5 mg total) by mouth daily 90 tablet 1    TREMFYA subcutaneous injection Inject 100 mg under the skin every 56 days        No current facility-administered medications on file prior to visit.          The patient was identified by name and date of birth. Mario Jenkins was informed that this is a telemedicine visit and that the visit is being conducted through the Epic Embedded platform. He agrees to proceed..  My office door was closed. No one else was in the room.  He acknowledged consent and understanding of privacy and security of the video platform. The patient has agreed to participate and understands they can discontinue the visit at any time.    Patient is aware this is a billable service.     I invested 5 minutes thoroughly reviewing the patient's medical history and discussing the care plan directly with the patient.      Verification of patient location:  Patient is located in [place] where I have an active license.

## 2024-04-25 ENCOUNTER — TELEPHONE (OUTPATIENT)
Dept: FAMILY MEDICINE CLINIC | Facility: CLINIC | Age: 49
End: 2024-04-25

## 2024-04-25 NOTE — TELEPHONE ENCOUNTER
Left message for Mario that Negra completed his forms and that the forms were faxed.  If he would like a copy he can pick them up.

## 2024-05-01 DIAGNOSIS — I10 ESSENTIAL HYPERTENSION: ICD-10-CM

## 2024-05-02 PROCEDURE — 4010F ACE/ARB THERAPY RXD/TAKEN: CPT | Performed by: CHIROPRACTOR

## 2024-05-02 RX ORDER — LISINOPRIL 30 MG/1
30 TABLET ORAL DAILY
Qty: 90 TABLET | Refills: 1 | Status: SHIPPED | OUTPATIENT
Start: 2024-05-02

## 2024-05-03 ENCOUNTER — OFFICE VISIT (OUTPATIENT)
Age: 49
End: 2024-05-03
Payer: COMMERCIAL

## 2024-05-03 VITALS
SYSTOLIC BLOOD PRESSURE: 160 MMHG | DIASTOLIC BLOOD PRESSURE: 92 MMHG | BODY MASS INDEX: 43.96 KG/M2 | WEIGHT: 314 LBS | HEART RATE: 87 BPM | HEIGHT: 71 IN

## 2024-05-03 DIAGNOSIS — M79.18 MYOFASCIAL PAIN: ICD-10-CM

## 2024-05-03 DIAGNOSIS — M47.814 THORACIC SPONDYLOSIS: ICD-10-CM

## 2024-05-03 DIAGNOSIS — M54.9 MID BACK PAIN: ICD-10-CM

## 2024-05-03 DIAGNOSIS — M54.2 NECK PAIN: Primary | ICD-10-CM

## 2024-05-03 PROCEDURE — 98940 CHIROPRACT MANJ 1-2 REGIONS: CPT | Performed by: CHIROPRACTOR

## 2024-05-03 PROCEDURE — 3077F SYST BP >= 140 MM HG: CPT | Performed by: CHIROPRACTOR

## 2024-05-03 PROCEDURE — 3080F DIAST BP >= 90 MM HG: CPT | Performed by: CHIROPRACTOR

## 2024-05-03 PROCEDURE — 99203 OFFICE O/P NEW LOW 30 MIN: CPT | Performed by: CHIROPRACTOR

## 2024-05-03 PROCEDURE — 97140 MANUAL THERAPY 1/> REGIONS: CPT | Performed by: CHIROPRACTOR

## 2024-05-03 NOTE — PROGRESS NOTES
HPI:  Back Pain  This is a chronic problem. The current episode started more than 1 year ago. The problem occurs intermittently. The problem has been waxing and waning since onset. The pain is present in the thoracic spine. The quality of the pain is described as aching and burning. The pain does not radiate. The pain is at a severity of 3/10. The pain is moderate. The pain is Worse during the day. The symptoms are aggravated by position, sitting and stress. He has tried heat, home exercises and NSAIDs for the symptoms. The treatment provided mild relief.     Mario Jenkins is a 48 y.o. male who presents for evaluation and possible treatment at the request of [facility].  He describes a history of chronic mid back as well as neck pain.  No 1 particular incident of trauma or injury.  In addition he has had low back pain on and off over the years.  Most recently he had some intensification of symptomatology had a course of physical therapy with some improvement but nothing long-lasting.    Today he presents for eval and possible treatment describing mid back pain along with neck pain.  He denies any radicular symptoms into the upper and/or lower extremity.  He denies any numbness or tingling.  He denies any weakness.    Pain is exacerbated by sitting still alleviated by moving his neck.  He notes some relative improvement with stretching along with the direct application of heat.  He feels some relief when he is in a pool and able to stretch.    His profession entails mainly office and computer work and he notes this does significantly exacerbate his symptomatology.  He has difficulty at night getting a comfortable position as he must sleep with his neck laterally bent and extended to get any form of relaxation.    He does report regular stretching program he gets into the gym doing mainly weights.      Chief Complaint   Patient presents with   • Neck - Pain     Neck pain that radiates down both  shoulders. Pain score   2-3  Patient states tight and sore      • Back Pain     Mid back is sore and tight    Pain score  3       Lower lumbar pain score 2   Patient states feels tight        Past Medical History:   Diagnosis Date   • Diabetes (HCC)    • Elevated ALT measurement 5/11/2022   • Hypertension    • Hyponatremia 5/24/2023   • Leukocytosis 1/28/2020   • Morbid obesity with BMI of 45.0-49.9, adult (HCC)       Past Surgical History:   Procedure Laterality Date   • WISDOM TOOTH EXTRACTION       Family History   Problem Relation Age of Onset   • No Known Problems Father    • Hypertension Maternal Grandfather    • Diabetes Maternal Grandfather    • Heart disease Maternal Grandfather    • Cancer Paternal Aunt    • Diabetes Mother    • Dementia Mother    • Obesity Mother    • Stroke Neg Hx      Social History     Socioeconomic History   • Marital status: /Civil Union     Spouse name: None   • Number of children: None   • Years of education: None   • Highest education level: None   Occupational History   • None   Tobacco Use   • Smoking status: Former   • Smokeless tobacco: Never   • Tobacco comments:     Never smoker per [employer]   Vaping Use   • Vaping status: Never Used   Substance and Sexual Activity   • Alcohol use: No   • Drug use: No   • Sexual activity: None   Other Topics Concern   • None   Social History Narrative   • None     Social Determinants of Health     Financial Resource Strain: Not on file   Food Insecurity: Not on file   Transportation Needs: Not on file   Physical Activity: Not on file   Stress: Not on file   Social Connections: Not on file   Intimate Partner Violence: Not on file   Housing Stability: Not on file       Current Outpatient Medications:   •  Blood Glucose Monitoring Suppl (ONE TOUCH ULTRA 2) w/Device KIT, Check blood sugars once daily. Please substitute with appropriate alternative as covered by patient's insurance. Dx: E11.65, Disp: 1 kit, Rfl: 0  •  dapagliflozin  (Farxiga) 10 MG tablet, Take 1 tablet (10 mg total) by mouth in the morning, Disp: 90 tablet, Rfl: 1  •  escitalopram (LEXAPRO) 10 mg tablet, Take 1 tablet (10 mg total) by mouth daily, Disp: 90 tablet, Rfl: 1  •  fenofibrate (TRICOR) 54 MG tablet, Take 1 tablet (54 mg total) by mouth daily, Disp: 90 tablet, Rfl: 1  •  glucose blood (OneTouch Ultra) test strip, Check blood sugars once daily. Please substitute with appropriate alternative as covered by patient's insurance. Dx: E11.65, Disp: 100 each, Rfl: 3  •  Januvia 100 MG tablet, TAKE 1 TABLET BY MOUTH EVERY DAY, Disp: 90 tablet, Rfl: 1  •  lisinopril (ZESTRIL) 30 mg tablet, Take 1 tablet (30 mg total) by mouth daily, Disp: 90 tablet, Rfl: 1  •  metFORMIN (GLUCOPHAGE) 1000 MG tablet, Take 1 tablet (1,000 mg total) by mouth 2 (two) times a day with meals, Disp: 180 tablet, Rfl: 1  •  Multiple Vitamin (Multivitamin Adult) TABS, Take 1 tablet by mouth daily, Disp: , Rfl:   •  OneTouch Delica Lancets 33G MISC, Check blood sugars once daily. Please substitute with appropriate alternative as covered by patient's insurance. Dx: E11.65, Disp: 100 each, Rfl: 3  •  rosuvastatin (CRESTOR) 5 mg tablet, Take 1 tablet (5 mg total) by mouth daily, Disp: 90 tablet, Rfl: 1  •  TREMFYA subcutaneous injection, Inject 100 mg under the skin every 56 days , Disp: , Rfl:   Allergies as of 05/03/2024   • (No Known Allergies)         The following portions of the patient's history were reviewed and updated as appropriate: allergies, current medications, past family history, past medical history, past social history, past surgical history, and problem list.    Review of Systems   Constitutional: Negative.    Musculoskeletal:  Positive for back pain, myalgias, neck pain and neck stiffness.   Neurological: Negative.    Psychiatric/Behavioral: Negative.         Physical Exam:  Physical Exam  Vitals reviewed.   Constitutional:       Appearance: Normal appearance.   Cardiovascular:      Rate  and Rhythm: Normal rate.      Pulses: Normal pulses.   Pulmonary:      Effort: Pulmonary effort is normal.   Musculoskeletal:      Right shoulder: Crepitus present.      Left shoulder: Crepitus present. Decreased range of motion.      Cervical back: Spasms and tenderness present. Pain with movement present. Decreased range of motion.      Thoracic back: Spasms and tenderness present. Decreased range of motion.        Back:       Comments: Right-sided head tilt    Scapulothoracic dysfunction on the left    C5-C6 C1-C2 segmental dysfunctions   Skin:     General: Skin is warm and dry.   Neurological:      General: No focal deficit present.      Mental Status: He is alert and oriented to person, place, and time.      Sensory: Sensation is intact.      Motor: Motor function is intact.      Deep Tendon Reflexes: Reflexes are normal and symmetric.   Psychiatric:         Mood and Affect: Mood normal.         Behavior: Behavior normal.         Thought Content: Thought content normal.     THORACIC SPINE     INDICATION:   M54.9: Dorsalgia, unspecified.     COMPARISON:  None     VIEWS:  XR SPINE THORACIC 3 VW  Images: 3     FINDINGS:     There is no fracture or pathologic bone lesion.     Thoracic vertebral alignment is within normal limits.     Age related degenerative changes are seen.     There is no displacement of the paraspinal line.     The pedicles appear intact.     IMPRESSION:     No acute osseous abnormality.  Degenerative changes as described.       Assessment:  Diagnoses and all orders for this visit:    Neck pain    Mid back pain  -     Ambulatory Referral to Chiropractic    Myofascial pain    Thoracic spondylosis         Treatment:  46214  Manipulation to the C5-C6 and C1-C2 levels producing good joint release well-tolerated by the patient today.    Therapeutic myofascial release to the bilateral shoulder girdles utilizing Graston technique.  I used GT 2, GT 3, and GT for instruments.  Scap thoracic junctions  targeted along with paraspinal tissues.  Scap thoracic ranges of motion performed and gentle mobilizations Scap thoracic junction well-tolerated bilaterally.  Is a 12-minute procedure.    Discussion:  I reviewed past medical records.  I reviewed diagnostics.  Discussed case with the patient.  Will trial a period of chiropractic care focused on soft tissue as well as joint mobilization and then we will concentrate on his home exercise program.  Reassessment 4 weeks.  No follow-ups on file.

## 2024-05-16 ENCOUNTER — PROCEDURE VISIT (OUTPATIENT)
Age: 49
End: 2024-05-16
Payer: COMMERCIAL

## 2024-05-16 VITALS — WEIGHT: 314 LBS | HEIGHT: 71 IN | BODY MASS INDEX: 43.96 KG/M2

## 2024-05-16 DIAGNOSIS — M54.9 MID BACK PAIN: ICD-10-CM

## 2024-05-16 DIAGNOSIS — M79.18 MYOFASCIAL PAIN: ICD-10-CM

## 2024-05-16 DIAGNOSIS — M54.2 NECK PAIN: Primary | ICD-10-CM

## 2024-05-16 DIAGNOSIS — M47.814 THORACIC SPONDYLOSIS: ICD-10-CM

## 2024-05-16 PROCEDURE — 98940 CHIROPRACT MANJ 1-2 REGIONS: CPT | Performed by: CHIROPRACTOR

## 2024-05-16 PROCEDURE — 97110 THERAPEUTIC EXERCISES: CPT | Performed by: CHIROPRACTOR

## 2024-05-16 NOTE — PROGRESS NOTES
HPI:  Mohamud returns for treatment today reports ongoing neck pain stiff and tight more right-sided than left also low back pain axially center in location.      The following portions of the patient's history were reviewed and updated as appropriate: allergies, current medications, past family history, past medical history, past social history, past surgical history, and problem list.    Review of Systems    Physical Exam:  Exam reveals paracervical hypertonicity noted biomechanically joint dysfunction T4-T5 C5-C6 C1-C2.  Cervical range of motion remains reduced in rotation.    Assessment:   Diagnosis ICD-10-CM Associated Orders   1. Neck pain  M54.2       2. Mid back pain  M54.9       3. Myofascial pain  M79.18       4. Thoracic spondylosis  M47.814                 Treatment: 46519  Therapeutic stretching neck bilateral shoulder girdle utilizing Graston technique.  I used GT 2, GT 3, and GT for instruments.  Scap thoracic junctions addressed well-tolerated.  Stretching bilateral shoulders well-tolerated.  Some 12-minute procedure.    Manipulation T4 C5 C1 producing good joint release well-tolerated.    Discussion:  Reviewed home stretching see him back for follow-up.

## 2024-06-10 ENCOUNTER — PROCEDURE VISIT (OUTPATIENT)
Age: 49
End: 2024-06-10
Payer: COMMERCIAL

## 2024-06-10 VITALS
DIASTOLIC BLOOD PRESSURE: 91 MMHG | BODY MASS INDEX: 43.96 KG/M2 | HEART RATE: 82 BPM | HEIGHT: 71 IN | WEIGHT: 314 LBS | SYSTOLIC BLOOD PRESSURE: 145 MMHG

## 2024-06-10 DIAGNOSIS — M47.814 THORACIC SPONDYLOSIS: ICD-10-CM

## 2024-06-10 DIAGNOSIS — M79.18 MYOFASCIAL PAIN: ICD-10-CM

## 2024-06-10 DIAGNOSIS — M54.2 NECK PAIN: Primary | ICD-10-CM

## 2024-06-10 DIAGNOSIS — M54.9 MID BACK PAIN: ICD-10-CM

## 2024-06-10 PROCEDURE — 98940 CHIROPRACT MANJ 1-2 REGIONS: CPT | Performed by: CHIROPRACTOR

## 2024-06-10 PROCEDURE — 97110 THERAPEUTIC EXERCISES: CPT | Performed by: CHIROPRACTOR

## 2024-06-21 ENCOUNTER — PROCEDURE VISIT (OUTPATIENT)
Age: 49
End: 2024-06-21
Payer: COMMERCIAL

## 2024-06-21 VITALS
HEART RATE: 89 BPM | SYSTOLIC BLOOD PRESSURE: 152 MMHG | DIASTOLIC BLOOD PRESSURE: 92 MMHG | WEIGHT: 314 LBS | BODY MASS INDEX: 43.96 KG/M2 | HEIGHT: 71 IN

## 2024-06-21 DIAGNOSIS — M54.9 MID BACK PAIN: ICD-10-CM

## 2024-06-21 DIAGNOSIS — M79.18 MYOFASCIAL PAIN: ICD-10-CM

## 2024-06-21 DIAGNOSIS — M47.814 THORACIC SPONDYLOSIS: ICD-10-CM

## 2024-06-21 DIAGNOSIS — M54.2 NECK PAIN: Primary | ICD-10-CM

## 2024-06-21 PROCEDURE — 98940 CHIROPRACT MANJ 1-2 REGIONS: CPT | Performed by: CHIROPRACTOR

## 2024-06-21 PROCEDURE — 97140 MANUAL THERAPY 1/> REGIONS: CPT | Performed by: CHIROPRACTOR

## 2024-06-21 NOTE — PROGRESS NOTES
HPI:  Mohamud returns for treatment today reports ongoing neck pain stiff and tight more right-sided than left also low back pain axially center in location.    VPAS  2      The following portions of the patient's history were reviewed and updated as appropriate: allergies, current medications, past family history, past medical history, past social history, past surgical history, and problem list.    Review of Systems    Physical Exam:  Exam reveals paracervical hypertonicity noted biomechanically joint dysfunction T4-T5 C5-C6 C1-C2.  Cervical range of motion remains reduced in rotation.    Assessment:   Diagnosis ICD-10-CM Associated Orders   1. Neck pain  M54.2       2. Mid back pain  M54.9       3. Myofascial pain  M79.18       4. Thoracic spondylosis  M47.814                 Treatment: 36924  Therapeutic stretching neck bilateral shoulder girdle utilizing Graston technique.  I used GT 2, GT 3, and GT for instruments.  Scap thoracic junctions addressed well-tolerated.  Stretching bilateral shoulders well-tolerated.  Some 12-minute procedure.    Manipulation T4 C5 C1 producing good joint release well-tolerated.    Discussion:  Reviewed home stretching see him back for follow-up.

## 2024-06-21 NOTE — PROGRESS NOTES
"      HPI:  Mohamud returns for treatment today reports ongoing neck pain stiff and tight more right-sided than left also low back pain axially center in location.  He has been feeling stiff and sore since last treatment notes tightness lower cervical facet joints and as well as in the mid back where he feels a \"pressure\" sensation.      The following portions of the patient's history were reviewed and updated as appropriate: allergies, current medications, past family history, past medical history, past social history, past surgical history, and problem list.    Review of Systems    Physical Exam:  Exam reveals paracervical hypertonicity noted biomechanically joint dysfunction T4-T5 C5-C6 C1-C2.  Cervical range of motion remains reduced in rotation.    Assessment:   Diagnosis ICD-10-CM Associated Orders   1. Neck pain  M54.2       2. Mid back pain  M54.9       3. Myofascial pain  M79.18       4. Thoracic spondylosis  M47.814                 Treatment: 41308  Therapeutic stretching neck bilateral shoulder girdle utilizing Graston technique.  I used GT 2, GT 3, and GT for instruments.  Scap thoracic junctions addressed well-tolerated.  Stretching bilateral shoulders well-tolerated.  12-minute procedure.    Manipulation T4 C5 C1 producing good joint release well-tolerated.    Discussion:  Reviewed home stretching see him back for follow-up.  Discussed that many times posttreatment there will be some spasm.  He is to work continually on stretching with application of moist heat prior he has good understanding.  He does have access to a pool we discussed exercises he can do there.  "

## 2024-07-05 ENCOUNTER — TELEPHONE (OUTPATIENT)
Dept: FAMILY MEDICINE CLINIC | Facility: CLINIC | Age: 49
End: 2024-07-05

## 2024-08-02 ENCOUNTER — PROCEDURE VISIT (OUTPATIENT)
Age: 49
End: 2024-08-02
Payer: COMMERCIAL

## 2024-08-02 VITALS
HEIGHT: 71 IN | DIASTOLIC BLOOD PRESSURE: 83 MMHG | WEIGHT: 314 LBS | BODY MASS INDEX: 43.96 KG/M2 | HEART RATE: 84 BPM | SYSTOLIC BLOOD PRESSURE: 140 MMHG

## 2024-08-02 DIAGNOSIS — M79.18 MYOFASCIAL PAIN: ICD-10-CM

## 2024-08-02 DIAGNOSIS — M47.814 THORACIC SPONDYLOSIS: ICD-10-CM

## 2024-08-02 DIAGNOSIS — M54.2 NECK PAIN: Primary | ICD-10-CM

## 2024-08-02 DIAGNOSIS — M54.9 MID BACK PAIN: ICD-10-CM

## 2024-08-02 PROCEDURE — 98940 CHIROPRACT MANJ 1-2 REGIONS: CPT | Performed by: CHIROPRACTOR

## 2024-08-02 PROCEDURE — 97110 THERAPEUTIC EXERCISES: CPT | Performed by: CHIROPRACTOR

## 2024-08-02 RX ORDER — SODIUM PICOSULFATE, MAGNESIUM OXIDE, AND ANHYDROUS CITRIC ACID 12; 3.5; 1 G/175ML; G/175ML; MG/175ML
LIQUID ORAL
COMMUNITY
Start: 2024-07-23

## 2024-08-02 NOTE — PROGRESS NOTES
HPI:  Mohamud returns for treatment today reports ongoing neck pain stiff and tight hard turning his neck.    The following portions of the patient's history were reviewed and updated as appropriate: allergies, current medications, past family history, past medical history, past social history, past surgical history, and problem list.    Review of Systems    Physical Exam:  Exam reveals paracervical hypertonicity noted biomechanically joint dysfunction T4-T5 C5-C6 C1-C2.  Cervical range of motion remains reduced in rotation.    Assessment:   Diagnosis ICD-10-CM Associated Orders   1. Neck pain  M54.2       2. Mid back pain  M54.9       3. Myofascial pain  M79.18       4. Thoracic spondylosis  M47.814                 Treatment: 36562  Therapeutic stretching neck bilateral shoulder girdle utilizing Graston technique.  I used GT 2, GT 3, and GT for instruments.  Scap thoracic junctions addressed well-tolerated.  Stretching bilateral shoulders well-tolerated.  12-minute procedure.  Posttreatment laser    Manipulation T4 C5 C1 via seated stairstepping technique well-tolerated.    Discussion:  Reviewed home stretching see him back for follow-up.

## 2024-08-22 DIAGNOSIS — E11.65 TYPE 2 DIABETES MELLITUS WITH HYPERGLYCEMIA, WITHOUT LONG-TERM CURRENT USE OF INSULIN (HCC): ICD-10-CM

## 2024-08-22 RX ORDER — DAPAGLIFLOZIN 10 MG/1
10 TABLET, FILM COATED ORAL DAILY
Qty: 90 TABLET | Refills: 1 | Status: SHIPPED | OUTPATIENT
Start: 2024-08-22

## 2024-08-22 NOTE — TELEPHONE ENCOUNTER
Reason for call:   [x] Refill   [] Prior Auth  [] Other:     Office:   [x] PCP/Provider -   [] Specialty/Provider -     Medication: Dapagliflozin 10 mg, take 1 tablet by mouth in the morning                        Metformin 1000 mg, take 1 tablet by mouth 2 times a day with meals      Pharmacy: [facility]     Does the patient have enough for 3 days?   [] Yes   [x] No - Send as HP to POD

## 2024-09-05 ENCOUNTER — PROCEDURE VISIT (OUTPATIENT)
Age: 49
End: 2024-09-05
Payer: COMMERCIAL

## 2024-09-05 VITALS
BODY MASS INDEX: 43.96 KG/M2 | HEART RATE: 80 BPM | SYSTOLIC BLOOD PRESSURE: 133 MMHG | WEIGHT: 314 LBS | HEIGHT: 71 IN | DIASTOLIC BLOOD PRESSURE: 91 MMHG

## 2024-09-05 DIAGNOSIS — M47.814 THORACIC SPONDYLOSIS: ICD-10-CM

## 2024-09-05 DIAGNOSIS — M79.18 MYOFASCIAL PAIN: ICD-10-CM

## 2024-09-05 DIAGNOSIS — M54.9 MID BACK PAIN: ICD-10-CM

## 2024-09-05 DIAGNOSIS — M54.2 NECK PAIN: Primary | ICD-10-CM

## 2024-09-05 PROCEDURE — 98940 CHIROPRACT MANJ 1-2 REGIONS: CPT | Performed by: CHIROPRACTOR

## 2024-09-05 PROCEDURE — 97110 THERAPEUTIC EXERCISES: CPT | Performed by: CHIROPRACTOR

## 2024-09-05 NOTE — PROGRESS NOTES
HPI:  Mohamud returns for treatment today reports ongoing neck pain reports stiffness but no pain.      The following portions of the patient's history were reviewed and updated as appropriate: allergies, current medications, past family history, past medical history, past social history, past surgical history, and problem list.    Review of Systems    Physical Exam:  Exam reveals paracervical hypertonicity noted biomechanically joint dysfunction T4-T5 C5-C6 C1-C2.  Cervical range of motion remains reduced in rotation.    Assessment:   Diagnosis ICD-10-CM Associated Orders   1. Neck pain  M54.2       2. Mid back pain  M54.9       3. Myofascial pain  M79.18       4. Thoracic spondylosis  M47.814                 Treatment: 72073  Therapeutic stretching neck bilateral shoulder girdle utilizing Graston technique.  I used GT 2, GT 3, and GT for instruments.  Scap thoracic junctions addressed well-tolerated.  Stretching bilateral shoulders well-tolerated.  12-minute procedure.  Posttreatment laser    Manipulation T4 C5 C1 via seated stairstepping technique well-tolerated.    Discussion:  Reviewed home stretching see him back for follow-up.

## 2024-09-16 ENCOUNTER — APPOINTMENT (OUTPATIENT)
Dept: LAB | Facility: CLINIC | Age: 49
End: 2024-09-16
Payer: COMMERCIAL

## 2024-09-16 DIAGNOSIS — E11.65 TYPE 2 DIABETES MELLITUS WITH HYPERGLYCEMIA, WITHOUT LONG-TERM CURRENT USE OF INSULIN (HCC): ICD-10-CM

## 2024-09-16 DIAGNOSIS — E78.1 HYPERTRIGLYCERIDEMIA: ICD-10-CM

## 2024-09-16 DIAGNOSIS — I10 ESSENTIAL HYPERTENSION: ICD-10-CM

## 2024-09-16 LAB
ALBUMIN SERPL BCG-MCNC: 4.5 G/DL (ref 3.5–5)
ALP SERPL-CCNC: 73 U/L (ref 34–104)
ALT SERPL W P-5'-P-CCNC: 39 U/L (ref 7–52)
ANION GAP SERPL CALCULATED.3IONS-SCNC: 7 MMOL/L (ref 4–13)
AST SERPL W P-5'-P-CCNC: 25 U/L (ref 13–39)
BILIRUB SERPL-MCNC: 0.55 MG/DL (ref 0.2–1)
BUN SERPL-MCNC: 23 MG/DL (ref 5–25)
CALCIUM SERPL-MCNC: 9.7 MG/DL (ref 8.4–10.2)
CHLORIDE SERPL-SCNC: 102 MMOL/L (ref 96–108)
CHOLEST SERPL-MCNC: 167 MG/DL
CO2 SERPL-SCNC: 25 MMOL/L (ref 21–32)
CREAT SERPL-MCNC: 0.9 MG/DL (ref 0.6–1.3)
EST. AVERAGE GLUCOSE BLD GHB EST-MCNC: 186 MG/DL
GFR SERPL CREATININE-BSD FRML MDRD: 99 ML/MIN/1.73SQ M
GLUCOSE P FAST SERPL-MCNC: 182 MG/DL (ref 65–99)
HBA1C MFR BLD: 8.1 %
HDLC SERPL-MCNC: 37 MG/DL
LDLC SERPL DIRECT ASSAY-MCNC: 93 MG/DL (ref 0–100)
POTASSIUM SERPL-SCNC: 4.2 MMOL/L (ref 3.5–5.3)
PROT SERPL-MCNC: 7.3 G/DL (ref 6.4–8.4)
SODIUM SERPL-SCNC: 134 MMOL/L (ref 135–147)
TRIGL SERPL-MCNC: 499 MG/DL

## 2024-09-16 PROCEDURE — 80061 LIPID PANEL: CPT

## 2024-09-16 PROCEDURE — 83721 ASSAY OF BLOOD LIPOPROTEIN: CPT

## 2024-09-16 PROCEDURE — 80053 COMPREHEN METABOLIC PANEL: CPT

## 2024-09-16 PROCEDURE — 83036 HEMOGLOBIN GLYCOSYLATED A1C: CPT

## 2024-09-16 PROCEDURE — 36415 COLL VENOUS BLD VENIPUNCTURE: CPT

## 2024-09-19 ENCOUNTER — PROCEDURE VISIT (OUTPATIENT)
Age: 49
End: 2024-09-19
Payer: COMMERCIAL

## 2024-09-19 VITALS
WEIGHT: 314 LBS | BODY MASS INDEX: 43.96 KG/M2 | DIASTOLIC BLOOD PRESSURE: 90 MMHG | HEART RATE: 89 BPM | HEIGHT: 71 IN | SYSTOLIC BLOOD PRESSURE: 146 MMHG

## 2024-09-19 DIAGNOSIS — M79.18 MYOFASCIAL PAIN: ICD-10-CM

## 2024-09-19 DIAGNOSIS — M54.9 MID BACK PAIN: ICD-10-CM

## 2024-09-19 DIAGNOSIS — M47.814 THORACIC SPONDYLOSIS: ICD-10-CM

## 2024-09-19 DIAGNOSIS — M54.2 NECK PAIN: Primary | ICD-10-CM

## 2024-09-19 PROCEDURE — 98940 CHIROPRACT MANJ 1-2 REGIONS: CPT | Performed by: CHIROPRACTOR

## 2024-09-19 PROCEDURE — 97110 THERAPEUTIC EXERCISES: CPT | Performed by: CHIROPRACTOR

## 2024-09-19 NOTE — PROGRESS NOTES
HPI:  Mohamud returns for treatment today reports ongoing neck pain reports stiffness with pain in the mid thoracic spine..      The following portions of the patient's history were reviewed and updated as appropriate: allergies, current medications, past family history, past medical history, past social history, past surgical history, and problem list.    Review of Systems    Physical Exam:  Exam reveals paracervical hypertonicity noted biomechanically joint dysfunction T4-T5 C5-C6 C1-C2.  Cervical range of motion remains reduced in rotation.    Assessment:   Diagnosis ICD-10-CM Associated Orders   1. Neck pain  M54.2       2. Mid back pain  M54.9       3. Myofascial pain  M79.18       4. Thoracic spondylosis  M47.814                 Treatment: 96282  Therapeutic stretching neck bilateral shoulder girdle utilizing Graston technique.  I used GT 2, GT 3, and GT for instruments.  Scap thoracic junctions addressed well-tolerated.  Stretching bilateral shoulders well-tolerated.  12-minute procedure.  Posttreatment laser    Manipulation T4 C5 C1 via seated stairstepping technique well-tolerated.    Discussion:  Reviewed home stretching see him back for follow-up.

## 2024-09-20 ENCOUNTER — OFFICE VISIT (OUTPATIENT)
Dept: FAMILY MEDICINE CLINIC | Facility: CLINIC | Age: 49
End: 2024-09-20

## 2024-09-20 VITALS
DIASTOLIC BLOOD PRESSURE: 70 MMHG | SYSTOLIC BLOOD PRESSURE: 124 MMHG | WEIGHT: 310 LBS | HEIGHT: 71 IN | RESPIRATION RATE: 16 BRPM | HEART RATE: 83 BPM | BODY MASS INDEX: 43.4 KG/M2 | OXYGEN SATURATION: 99 %

## 2024-09-20 DIAGNOSIS — G47.19 EXCESSIVE DAYTIME SLEEPINESS: ICD-10-CM

## 2024-09-20 DIAGNOSIS — E78.2 MIXED HYPERLIPIDEMIA: ICD-10-CM

## 2024-09-20 DIAGNOSIS — E78.1 HYPERTRIGLYCERIDEMIA: ICD-10-CM

## 2024-09-20 DIAGNOSIS — E66.01 MORBID OBESITY (HCC): ICD-10-CM

## 2024-09-20 DIAGNOSIS — E11.65 TYPE 2 DIABETES MELLITUS WITH HYPERGLYCEMIA, WITHOUT LONG-TERM CURRENT USE OF INSULIN (HCC): ICD-10-CM

## 2024-09-20 DIAGNOSIS — H91.93 DECREASED HEARING OF BOTH EARS: Primary | ICD-10-CM

## 2024-09-20 DIAGNOSIS — I10 ESSENTIAL HYPERTENSION: ICD-10-CM

## 2024-09-20 PROBLEM — M54.9 MID BACK PAIN: Status: RESOLVED | Noted: 2023-03-06 | Resolved: 2024-09-20

## 2024-09-20 PROBLEM — G89.29 CHRONIC LEFT SHOULDER PAIN: Status: RESOLVED | Noted: 2023-03-06 | Resolved: 2024-09-20

## 2024-09-20 PROBLEM — R73.01 ELEVATED FASTING BLOOD SUGAR: Status: RESOLVED | Noted: 2020-08-18 | Resolved: 2024-09-20

## 2024-09-20 PROBLEM — E78.5 HYPERLIPIDEMIA: Status: RESOLVED | Noted: 2020-01-28 | Resolved: 2024-09-20

## 2024-09-20 PROBLEM — Z00.00 ANNUAL PHYSICAL EXAM: Status: RESOLVED | Noted: 2023-11-30 | Resolved: 2024-09-20

## 2024-09-20 PROBLEM — L21.9 SEBORRHEIC DERMATITIS: Status: RESOLVED | Noted: 2019-01-22 | Resolved: 2024-09-20

## 2024-09-20 PROBLEM — L40.9 PSORIASIS: Status: RESOLVED | Noted: 2019-12-10 | Resolved: 2024-09-20

## 2024-09-20 PROBLEM — Z23 ENCOUNTER FOR IMMUNIZATION: Status: RESOLVED | Noted: 2023-11-30 | Resolved: 2024-09-20

## 2024-09-20 PROBLEM — E87.1 HYPONATREMIA: Status: RESOLVED | Noted: 2023-11-30 | Resolved: 2024-09-20

## 2024-09-20 PROBLEM — R63.5 ABNORMAL WEIGHT GAIN: Status: RESOLVED | Noted: 2021-09-01 | Resolved: 2024-09-20

## 2024-09-20 PROBLEM — M25.561 BILATERAL KNEE PAIN: Status: RESOLVED | Noted: 2022-06-22 | Resolved: 2024-09-20

## 2024-09-20 PROBLEM — M25.512 CHRONIC LEFT SHOULDER PAIN: Status: RESOLVED | Noted: 2023-03-06 | Resolved: 2024-09-20

## 2024-09-20 PROBLEM — M25.562 BILATERAL KNEE PAIN: Status: RESOLVED | Noted: 2022-06-22 | Resolved: 2024-09-20

## 2024-09-20 RX ORDER — FENOFIBRATE 54 MG/1
108 TABLET ORAL DAILY
Qty: 90 TABLET | Refills: 1 | Status: SHIPPED | OUTPATIENT
Start: 2024-09-20

## 2024-09-20 NOTE — PROGRESS NOTES
New Patient Outpatient Note    HPI:     Mario Jenkins, 48 y.o. male  presents today as a new patient and to establish care.  The patient is transferring over from [place].  He does have a significant past medical history of type 2 diabetes, hyperlipidemia, hypertriglyceridemia, and hypertension.  The patient has been dealing with increased tiredness and fatigue during the day, he has been going to bed earlier and can fall asleep at the drop of a hat.  He would be open to looking into causes for this issue.  Additionally he has been having increased stress in his daily life.  This has led to an increase in weight gain, hemoglobin A1c, and triglycerides.  He is aware of these that were recently obtained labs, and he is going to work to cut this down.    Looking at the hypertriglyceridemia, the patient was recently switched from niacin over to Tricor, it did increase the amount of triglycerides almost double, but there is other components such as stress and stress eating that might be associated as well.  The patient is open to looking into options for weight management and decreasing weight, he does feel that this is affecting his daily life and also sex drive/libido.    Family Hx  UTD in chart    Past Medical History:   Diagnosis Date    Bilateral knee pain 06/22/2022    Chronic left shoulder pain 03/06/2023    Diabetes (HCC)     Elevated ALT measurement 05/11/2022    Elevated fasting blood sugar 08/18/2020    Hyperlipidemia 01/28/2020    Hypertension     Hyponatremia 05/24/2023    Leukocytosis 01/28/2020    Mid back pain 03/06/2023    Morbid obesity with BMI of 45.0-49.9, adult (HCC)     Psoriasis 12/10/2019    Seborrheic dermatitis 01/22/2019        Past Surgical History:   Procedure Laterality Date    WISDOM TOOTH EXTRACTION            Current Outpatient Medications:     fenofibrate (TRICOR) 54 MG tablet, Take 2 tablets (108 mg total) by mouth daily, Disp: 90 tablet, Rfl: 1    Blood Glucose Monitoring Suppl (ONE  TOUCH ULTRA 2) w/Device KIT, Check blood sugars once daily. Please substitute with appropriate alternative as covered by patient's insurance. Dx: E11.65, Disp: 1 kit, Rfl: 0    Clenpiq oral solution, Use as directed, Disp: , Rfl:     dapagliflozin (Farxiga) 10 MG tablet, Take 1 tablet (10 mg total) by mouth in the morning, Disp: 90 tablet, Rfl: 1    escitalopram (LEXAPRO) 10 mg tablet, Take 1 tablet (10 mg total) by mouth daily, Disp: 90 tablet, Rfl: 1    glucose blood (OneTouch Ultra) test strip, Check blood sugars once daily. Please substitute with appropriate alternative as covered by patient's insurance. Dx: E11.65, Disp: 100 each, Rfl: 3    Januvia 100 MG tablet, TAKE 1 TABLET BY MOUTH EVERY DAY, Disp: 90 tablet, Rfl: 1    lisinopril (ZESTRIL) 30 mg tablet, Take 1 tablet (30 mg total) by mouth daily, Disp: 90 tablet, Rfl: 1    metFORMIN (GLUCOPHAGE) 1000 MG tablet, Take 1 tablet (1,000 mg total) by mouth 2 (two) times a day with meals, Disp: 180 tablet, Rfl: 1    Multiple Vitamin (Multivitamin Adult) TABS, Take 1 tablet by mouth daily, Disp: , Rfl:     OneTouch Delica Lancets 33G MISC, Check blood sugars once daily. Please substitute with appropriate alternative as covered by patient's insurance. Dx: E11.65, Disp: 100 each, Rfl: 3    rosuvastatin (CRESTOR) 5 mg tablet, Take 1 tablet (5 mg total) by mouth daily, Disp: 90 tablet, Rfl: 1    TREMFYA subcutaneous injection, Inject 100 mg under the skin every 56 days , Disp: , Rfl:      SOCIAL:   Rent/Own:  Own  Currently living with: Wife, 2 sons  Stable food: Yes  Safe At Home: Yes  Hobbies:  watch football, weight lift, walking  Profession/ employment: business analysis  Restriction to medical procedures:  None    SEXUAL HISTORY:   Preference:   Women  Sexually Active:  not currently, intermittent  Birth Control:  NA    Psychological History  Psychiatric history: Anxiety  History of inpatient:  None  Current Therapy/ Provider:  None  Current Medications:  Lexapro  10mg     Substance History  Smoking: former, 4 years in college  Alcohol Use: 1x per month  Substance use:  None      ROS:   Review of Systems   Constitutional:  Negative for chills, fever and unexpected weight change.        Occasionally night sweats   HENT:  Positive for hearing loss. Negative for congestion, ear discharge, ear pain, postnasal drip, rhinorrhea, sinus pressure, sinus pain and sore throat.    Eyes:  Positive for visual disturbance (wears glasses occaisonally).   Respiratory:  Negative for cough, chest tightness and shortness of breath.    Cardiovascular:  Negative for chest pain and palpitations.   Gastrointestinal:  Negative for abdominal pain, blood in stool, constipation, diarrhea, nausea and vomiting.   Genitourinary:  Negative for dysuria and frequency.   Skin:  Negative for rash and wound.   Neurological:  Positive for headaches (occasionally). Negative for dizziness and light-headedness.   Psychiatric/Behavioral:  Negative for self-injury and suicidal ideas.         OBJECTIVE  Vitals:    09/20/24 1023   BP: 124/70   Pulse: 83   Resp: 16   SpO2: 99%        Physical Exam  Constitutional:       General: He is not in acute distress.     Appearance: Normal appearance. He is obese. He is not ill-appearing, toxic-appearing or diaphoretic.   HENT:      Head: Normocephalic and atraumatic.      Right Ear: Tympanic membrane, ear canal and external ear normal. There is no impacted cerumen.      Left Ear: Tympanic membrane, ear canal and external ear normal. There is no impacted cerumen.      Nose: Nose normal. No congestion or rhinorrhea.      Mouth/Throat:      Mouth: Mucous membranes are moist.      Pharynx: Oropharynx is clear. No oropharyngeal exudate or posterior oropharyngeal erythema.   Eyes:      General:         Right eye: No discharge.         Left eye: No discharge.      Pupils: Pupils are equal, round, and reactive to light.   Cardiovascular:      Rate and Rhythm: Normal rate and regular  rhythm.      Heart sounds: Normal heart sounds. No murmur heard.     No friction rub. No gallop.   Pulmonary:      Effort: Pulmonary effort is normal. No respiratory distress.      Breath sounds: Normal breath sounds. No stridor. No wheezing, rhonchi or rales.   Abdominal:      General: Bowel sounds are normal. There is no distension.      Palpations: Abdomen is soft.      Tenderness: There is no abdominal tenderness.   Musculoskeletal:      Cervical back: Neck supple. No tenderness.   Lymphadenopathy:      Cervical: No cervical adenopathy.   Skin:     General: Skin is warm.      Capillary Refill: Capillary refill takes less than 2 seconds.   Neurological:      Mental Status: He is alert.      Sensory: No sensory deficit (light touch in all 4 extremites).      Motor: No weakness (5/5 in all 4 extremities).            ASSESSMENT AND PLAN   Mario was seen today for establish care.  Diagnoses and all orders for this visit:    Decreased hearing of both ears  Patient is having decreased hearing of both ears.  With discussion, there may be some other component of tiredness/excessive daytime sleepiness that is preventing him from focusing.  Will obtain an audiology referral to rule out any underlying frequency issues, but will also evaluate sleep study to rule out DORENE  -     Ambulatory Referral to Audiology; Future    Excessive daytime sleepiness  Patient has significant body habitus and excessive daytime tiredness.  He states that he has had trouble focusing and occasional headaches.  Will obtain a sleep study to determine if he has underlying sleep apnea.  It is possible that his difficulty with focusing during the day and completing tasks may be associated with this increased tiredness/fatigue  -     Ambulatory Referral to Sleep Medicine; Future    Essential hypertension  Stable.  124/70 on presentation.  Recommended continued lisinopril 30 mg daily.    Hypertriglyceridemia  Mixed hyperlipidemia  Most recent labs  demonstrate LDL, total cholesterol within normal limits, but triglycerides are significantly elevated from March 2024.  Currently 499.  He does admit to eating more fatty foods due to stress eating, but he is currently on Tricor 54 mg.  Will increase to 108 mg to help with bringing this down and avoiding complications such as pancreatitis and gallstones.  Will follow-up in 3 months.  -     Lipid panel; Future  -     fenofibrate (TRICOR) 54 MG tablet; Take 2 tablets (108 mg total) by mouth daily    Type 2 diabetes mellitus with hyperglycemia, without long-term current use of insulin (HCC)  Morbid obesity (HCC)  Patient's most recent labs demonstrated an increase in A1c from 7.5 to 8.1.  He does admit to eating more due to stress and anxiety at home and at work.  He will be looking to reduce his carb intake again, and will continue with current medication regimen of Farxiga 10 mg, Januvia 100 mg daily, and metformin 1000 mg twice daily.  I discussed possible GLP-1 injectables due to its benefit with weight loss, kidney protection, and diabetes management.  He will be looking into this and will let me know if he would like to start this medication.  -     Hemoglobin A1C; Future      DO Urban Sinclair [facility]  9/20/2024 11:15 AM

## 2024-09-20 NOTE — PATIENT INSTRUCTIONS
Patient Education     Tirzepatide (tir ZEP a tide)   Brand Names: [place] Mounjaro; Zepbound   Brand Names: Lucille Mounjaro   Warning   This drug has been shown to cause thyroid cancer in some animals. It is not known if this happens in humans. If thyroid cancer happens, it may be deadly if not found and treated early. Call your doctor right away if you have a neck mass, trouble breathing, trouble swallowing, or have hoarseness that will not go away.  Do not use this drug if you have a health problem called Multiple Endocrine Neoplasia syndrome type 2 (MEN 2), or if you or a family member have had thyroid cancer.  Have your blood work checked and thyroid ultrasounds as you have been told by your doctor.  What is this drug used for?   It is used to lower blood sugar in people with type 2 diabetes.  It is used to help with weight loss in certain people.  What do I need to tell my doctor BEFORE I take this drug?   All products:   If you are allergic to this drug; any part of this drug; or any other drugs, foods, or substances. Tell your doctor about the allergy and what signs you had.  If you have ever had pancreatitis.  If you have stomach or bowel problems.  If you are using another drug that has the same drug in it.  If you are using another drug like this one. If you are not sure, ask your doctor or pharmacist.  If you are using this drug for diabetes:   If you have type 1 diabetes. Do not use this drug to treat type 1 diabetes.  Zepbound:   If you have or have ever had depression or thoughts of suicide.  This is not a list of all drugs or health problems that interact with this drug.  Tell your doctor and pharmacist about all of your drugs (prescription or OTC, natural products, vitamins) and health problems. You must check to make sure that it is safe for you to take this drug with all of your drugs and health problems. Do not start, stop, or change the dose of any drug without checking with your doctor.  What are  some things I need to know or do while I take this drug?   All products:   Tell all of your health care providers that you take this drug. This includes your doctors, nurses, pharmacists, and dentists.  Follow the diet and workout plan that your doctor told you about.  Talk with your doctor before you drink alcohol.  Birth control pills may not work as well to prevent pregnancy. If you take birth control pills, you may need to switch to another type of hormone-based birth control like a vaginal ring if your doctor tells you to. If another type of hormone-based birth control is not an option, use some other kind of birth control also, like a condom. Do this for 4 weeks after starting this drug and for 4 weeks each time the dose is raised.  This drug may prevent other drugs taken by mouth from getting into the body. If you take other drugs by mouth, you may need to take them at some other time than this drug. Talk with your doctor.  Do not share with another person even if the needle has been changed. Sharing your tray or pen may pass infections from one person to another. This includes infections you may not know you have.  If you cannot drink liquids by mouth or if you have upset stomach, throwing up, or diarrhea that does not go away, you need to avoid getting dehydrated. Contact your doctor to find out what to do. Dehydration may lead to low blood pressure or to new or worsening kidney problems.  A severe and sometimes deadly pancreas problem (pancreatitis) has happened with other drugs like this one.  If you are using this drug for diabetes:   It may be harder to control blood sugar during times of stress such as fever, infection, injury, or surgery. A change in physical activity, exercise, or diet may also affect blood sugar.  Check your blood sugar as you have been told by your doctor.  Do not drive if your blood sugar has been low. There is a greater chance of you having a crash.  Wear disease medical alert ID  (identification).  Tell your doctor if you are pregnant, plan on getting pregnant, or are breast-feeding. You will need to talk about the benefits and risks to you and the baby.  Zepbound:   If you have high blood sugar (diabetes), you will need to watch your blood sugar closely.  Weight loss during pregnancy may cause harm to the unborn baby. If you get pregnant while taking this drug or if you want to get pregnant, call your doctor right away.  Tell your doctor if you are breast-feeding. You will need to talk about any risks to your baby.  What are some side effects that I need to call my doctor about right away?   WARNING/CAUTION: Even though it may be rare, some people may have very bad and sometimes deadly side effects when taking a drug. Tell your doctor or get medical help right away if you have any of the following signs or symptoms that may be related to a very bad side effect:  All products:   Signs of an allergic reaction, like rash; hives; itching; red, swollen, blistered, or peeling skin with or without fever; wheezing; tightness in the chest or throat; trouble breathing, swallowing, or talking; unusual hoarseness; or swelling of the mouth, face, lips, tongue, or throat.  Signs of kidney problems like unable to pass urine, change in how much urine is passed, blood in the urine, or a big weight gain.  Signs of gallbladder problems like pain in the upper right belly area, right shoulder area, or between the shoulder blades; yellow skin or eyes; fever with chills; bloating; or very upset stomach or throwing up.  Signs of a pancreas problem (pancreatitis) like very bad stomach pain, very bad back pain, or very bad upset stomach or throwing up.  Dizziness or passing out.  A fast heartbeat.  Change in eyesight.  Low blood sugar can happen. The chance may be raised when this drug is used with other drugs for diabetes. Signs may be dizziness, headache, feeling sleepy or weak, shaking, fast heartbeat, confusion,  hunger, or sweating. Call your doctor right away if you have any of these signs. Follow what you have been told to do for low blood sugar. This may include taking glucose tablets, liquid glucose, or some fruit juices.  Zepbound:   New or worse behavior or mood changes like depression or thoughts of suicide.  What are some other side effects of this drug?   All drugs may cause side effects. However, many people have no side effects or only have minor side effects. Call your doctor or get medical help if any of these side effects or any other side effects bother you or do not go away:  All products:   Constipation, diarrhea, stomach pain, upset stomach, throwing up, or decreased appetite.  Heartburn.  Pain, itching, or other irritation where the injection was given.  Zepbound:   Feeling tired or weak.  Hair loss.  These are not all of the side effects that may occur. If you have questions about side effects, call your doctor. Call your doctor for medical advice about side effects.  You may report side effects to your national health agency.  You may report side effects to the FDA at 1-872.450.9164. You may also report side effects at https://www.fda.gov/medwatch.  How is this drug best taken?   Use this drug as ordered by your doctor. Read all information given to you. Follow all instructions closely.  All products:   It is given as a shot into the fatty part of the skin on the top of the thigh, belly area, or upper arm.  If you will be giving yourself the shot, your doctor or nurse will teach you how to give the shot.  Keep taking this drug as you have been told by your doctor or other health care provider, even if you feel well.  Take the same day each week.  Move site where you give the shot each time.  Take with or without food.  Wash your hands before and after use.  Do not use if the solution is leaking or has particles.  This drug is colorless to a faint yellow. Do not use if the solution changes color.  Do not  move this drug from the pen to a syringe.  Each pen or vial is for 1 use only. Throw away any part of the used pen after the dose is given.  Throw away needles in a needle/sharp disposal box. Do not reuse needles or other items. When the box is full, follow all local rules for getting rid of it. Talk with a doctor or pharmacist if you have any questions.  Vials:   It is important to have the right syringe to measure your dose. If you do not have the right syringe or you are not sure, talk with your pharmacist.  If you are using this drug for diabetes:   If you are also using insulin, you may inject this drug and the insulin in the same area of the body but not right next to each other.  Do not mix this drug in the same syringe with insulin.  What do I do if I miss a dose?   If it is within 4 days after the missed dose, take the missed dose and go back to your normal day.  If it has been more than 4 days since the missed dose, skip the missed dose and go back to your normal day.  Do not take 2 doses at the same time or extra doses.  How do I store and/or throw out this drug?   Store in a refrigerator. Do not freeze.  Do not use if it has been frozen.  If needed, each pen or vial may be stored at room temperature for up to 21 days. If you store at room temperature, throw away any part not used after 21 days.  Protect from heat.  Store in the original container to protect from light.  Keep all drugs in a safe place. Keep all drugs out of the reach of children and pets.  Throw away unused or  drugs. Do not flush down a toilet or pour down a drain unless you are told to do so. Check with your pharmacist if you have questions about the best way to throw out drugs. There may be drug take-back programs in your area.  General drug facts   If your symptoms or health problems do not get better or if they become worse, call your doctor.  Do not share your drugs with others and do not take anyone else's drugs.  Some drugs  may have another patient information leaflet. If you have any questions about this drug, please talk with your doctor, nurse, pharmacist, or other health care provider.  This drug comes with an extra patient fact sheet called a Medication Guide. Read it with care. Read it again each time this drug is refilled. If you have any questions about this drug, please talk with the doctor, pharmacist, or other health care provider.  If you think there has been an overdose, call your poison control center or get medical care right away. Be ready to tell or show what was taken, how much, and when it happened.  Consumer Information Use and Disclaimer   This generalized information is a limited summary of diagnosis, treatment, and/or medication information. It is not meant to be comprehensive and should be used as a tool to help the user understand and/or assess potential diagnostic and treatment options. It does NOT include all information about conditions, treatments, medications, side effects, or risks that may apply to a specific patient. It is not intended to be medical advice or a substitute for the medical advice, diagnosis, or treatment of a health care provider based on the health care provider's examination and assessment of a patient's specific and unique circumstances. Patients must speak with a health care provider for complete information about their health, medical questions, and treatment options, including any risks or benefits regarding use of medications. This information does not endorse any treatments or medications as safe, effective, or approved for treating a specific patient. Up[employer], Inc[employer] and its affiliates disclaim any warranty or liability relating to this information or the use thereof. The use of this information is governed by the Terms of Use, available at https://www.wolterskluwer.com/en/know/clinical-effectiveness-terms.  Last Reviewed Date   2024-04-17  Copyright   © 2024 UpToDate, Inc[employer] and  its affiliates and/or licensors. All rights reserved.

## 2024-09-23 ENCOUNTER — PATIENT MESSAGE (OUTPATIENT)
Dept: FAMILY MEDICINE CLINIC | Facility: CLINIC | Age: 49
End: 2024-09-23

## 2024-09-27 ENCOUNTER — TELEPHONE (OUTPATIENT)
Age: 49
End: 2024-09-27

## 2024-09-27 NOTE — TELEPHONE ENCOUNTER
Patient called states sent a my chart message with questions regarding the Mounjaro. Would like to know status before making decision to start medication

## 2024-09-27 NOTE — TELEPHONE ENCOUNTER
I will attempt to get to the patient message in the next several days.  Thank you.     Faisal Eric DO  [facility]  9/27/2024 2:14 PM

## 2024-09-28 ENCOUNTER — TRANSCRIBE ORDERS (OUTPATIENT)
Dept: SLEEP CENTER | Facility: CLINIC | Age: 49
End: 2024-09-28

## 2024-09-28 DIAGNOSIS — G47.19 EXCESSIVE DAYTIME SLEEPINESS: Primary | ICD-10-CM

## 2024-09-30 DIAGNOSIS — F41.9 ANXIETY: ICD-10-CM

## 2024-09-30 RX ORDER — ESCITALOPRAM OXALATE 10 MG/1
10 TABLET ORAL DAILY
Qty: 90 TABLET | Refills: 1 | Status: SHIPPED | OUTPATIENT
Start: 2024-09-30

## 2024-09-30 NOTE — TELEPHONE ENCOUNTER
Reason for call:   [x] Refill   [] Prior Auth  [] Other:     Office:   [x] PCP/Provider - [facility]  Authorized By: LARS Dorado  [] Specialty/Provider -     Medication: escitalopram (LEXAPRO) 10 mg tablet     Dose/Frequency: Take 1 tablet (10 mg total) by mouth daily     Quantity: 90 tablet     Pharmacy: [facility]/pharm[facility] #[id number] [address]      Does the patient have enough for 3 days?   [] Yes   [x] No - Send as HP to POD

## 2024-10-01 ENCOUNTER — TELEPHONE (OUTPATIENT)
Age: 49
End: 2024-10-01

## 2024-10-01 ENCOUNTER — PATIENT MESSAGE (OUTPATIENT)
Dept: FAMILY MEDICINE CLINIC | Facility: CLINIC | Age: 49
End: 2024-10-01

## 2024-10-01 DIAGNOSIS — E66.01 MORBID OBESITY (HCC): ICD-10-CM

## 2024-10-01 DIAGNOSIS — E11.65 TYPE 2 DIABETES MELLITUS WITH HYPERGLYCEMIA, WITHOUT LONG-TERM CURRENT USE OF INSULIN (HCC): Primary | ICD-10-CM

## 2024-10-01 NOTE — TELEPHONE ENCOUNTER
Called patient to reschedule appointment for 10/10/24. Dr. Frank will be out of the office. Left message to call back to reschedule

## 2024-10-03 ENCOUNTER — TELEPHONE (OUTPATIENT)
Age: 49
End: 2024-10-03

## 2024-10-03 NOTE — TELEPHONE ENCOUNTER
Patient called in to follow up on Januvia. Patient plans to  order for Mounjaro tomorrow and RN advised to hold off starting Mounjaro until provider determines if patent to stop Januvia and if provider will order new medication. Please follow up with provider response.

## 2024-10-03 NOTE — TELEPHONE ENCOUNTER
Called patient and reviewed the Januvia and his starting Mounjaro.  I told him to hold the Januvia if he starts Mounjaro to avoid any sugars dropping too low.  As we increase the dosage of Mounjaro if tolerated he will likely not require the Januvia and hopefully will have better control of his diabetes.

## 2024-10-11 ENCOUNTER — TELEPHONE (OUTPATIENT)
Dept: FAMILY MEDICINE CLINIC | Facility: CLINIC | Age: 49
End: 2024-10-11

## 2024-10-11 ENCOUNTER — HOSPITAL ENCOUNTER (OUTPATIENT)
Dept: VASCULAR ULTRASOUND | Facility: HOSPITAL | Age: 49
Discharge: HOME | End: 2024-10-11
Attending: FAMILY MEDICINE
Payer: COMMERCIAL

## 2024-10-11 ENCOUNTER — OFFICE VISIT (OUTPATIENT)
Dept: FAMILY MEDICINE CLINIC | Facility: CLINIC | Age: 49
End: 2024-10-11

## 2024-10-11 VITALS
HEIGHT: 71 IN | DIASTOLIC BLOOD PRESSURE: 70 MMHG | SYSTOLIC BLOOD PRESSURE: 120 MMHG | RESPIRATION RATE: 16 BRPM | HEART RATE: 96 BPM | OXYGEN SATURATION: 97 % | BODY MASS INDEX: 43.4 KG/M2 | WEIGHT: 310 LBS

## 2024-10-11 DIAGNOSIS — R22.41 LOCALIZED SWELLING OF RIGHT LOWER EXTREMITY: ICD-10-CM

## 2024-10-11 DIAGNOSIS — M62.89 TIGHTNESS OF RIGHT GASTROCNEMIUS MUSCLE: ICD-10-CM

## 2024-10-11 DIAGNOSIS — R22.41 LOCALIZED SWELLING OF RIGHT LOWER EXTREMITY: Primary | ICD-10-CM

## 2024-10-11 DIAGNOSIS — M79.604 PAIN OF RIGHT LOWER EXTREMITY: ICD-10-CM

## 2024-10-11 PROCEDURE — 93971 EXTREMITY STUDY: CPT | Performed by: SURGERY

## 2024-10-11 PROCEDURE — 93971 EXTREMITY STUDY: CPT

## 2024-10-11 NOTE — PROGRESS NOTES
Outpatient Note- Follow up     HPI:     Mario Jenkins , 49 y.o. male  presents today for right lower extremity swelling.  The patient about a week ago hit the leg with a mallet.  Initially there was a large area of swelling that went down over the course of the day.  Throughout the week he has been experiencing increased tightness and today he noted a significant increase in size compared to the left.  He did measure the lower extremities and the right is about an inch greater than the left.  The pain is about a 2/10 in intensity on a regular basis.    Onset of right lower extremity pain:  1 week ago  Intensity of pain: 2-4/10  Location of Pain: Right anterior region  Radiation: Localized  Character of Pain: Tightness  Constant intermittent: Constant  Exacerbating factors: Dorsiflexion/plantarflexion makes it worse, walking or standing  Relieving Factors: Raising leg to be flat/180 degrees.  Associated Symptoms:    -  Erythema, Numbness, tingling  -  Decreased ROM  +  Decreased Strength    Inciting Event/ Trauma:  trauma with mallet  Progression:  same pain, no change  Previous Episodes: None  Specialist Follow up: None  Hx of Physical Therapy:  None  Prior medications:  None      Past Medical History:   Diagnosis Date    Bilateral knee pain 06/22/2022    Chronic left shoulder pain 03/06/2023    Diabetes (HCC)     Elevated ALT measurement 05/11/2022    Elevated fasting blood sugar 08/18/2020    Hyperlipidemia 01/28/2020    Hypertension     Hyponatremia 05/24/2023    Leukocytosis 01/28/2020    Mid back pain 03/06/2023    Morbid obesity with BMI of 45.0-49.9, adult (HCC)     Psoriasis 12/10/2019    Seborrheic dermatitis 01/22/2019      ROS:   Review of Systems   Constitutional:  Negative for chills and fever.   Respiratory:  Negative for cough, chest tightness and shortness of breath.    Cardiovascular:  Negative for chest pain and palpitations.   Musculoskeletal:  Positive for arthralgias and myalgias.         OBJECTIVE  Vitals:    10/11/24 1334   BP: 120/70   Pulse: 96   Resp: 16   SpO2: 97%        Physical Exam  Constitutional:       General: He is not in acute distress.     Appearance: Normal appearance. He is obese. He is not ill-appearing, toxic-appearing or diaphoretic.   Cardiovascular:      Rate and Rhythm: Normal rate and regular rhythm.      Heart sounds: Normal heart sounds. No murmur heard.     No friction rub. No gallop.   Pulmonary:      Effort: Pulmonary effort is normal. No respiratory distress.      Breath sounds: Normal breath sounds. No stridor. No wheezing, rhonchi or rales.   Abdominal:      General: Bowel sounds are normal.      Palpations: Abdomen is soft.   Musculoskeletal:         General: Swelling (44cm on right, 41cm on left) and tenderness present.   Skin:     General: Skin is warm.      Capillary Refill: Capillary refill takes less than 2 seconds.   Neurological:      Mental Status: He is alert.      Motor: Weakness (mild weakness in right lower extremity with dorsiflexion and extension, pain with flexion of knee) present.          ASSESSMENT AND PLAN   Mario was seen today for leg pain and knee pain.  Diagnoses and all orders for this visit:    Localized swelling of right lower extremity  Pain of right lower extremity  Tightness of right gastrocnemius muscle  Patient presents today with increased tightness, pain, and mild redness of the right lower extremity.  He did have a trauma to the area about a week ago.  Unknown whether or not this was provoked just walking on it over time with bruising and pain, but the pain patient needs to have a DVT ruled out.  I have given him Xarelto 15 mg 2 samples of 7 pills.  This is only if he has a positive DVT.  It is possible that this may be an alternate diagnosis such as muscle bruise, muscle tear, bone bruise, hematoma.  I am hopeful that the ultrasound will rule out any major concerns, and the patient can start to take naproxen or NSAID to help  with pain and discomfort.  -     VAS VENOUS DUPLEX -LOWER LIMB UNILATERAL; Future         DO Urban Sinclair [facility]  10/11/2024 2:02 PM

## 2024-10-11 NOTE — TELEPHONE ENCOUNTER
Attemtped to call the patient and review results.  I left a detailed message on machine since he had name associated with voicemail.  Patient has possibly a Baker's cyst, but there is no evidence of hematoma or DVT.  The tech reach out to me at the time of the evaluation and inform me it was negative.  We sent him home.  He does not need to take the anticoagulation.  Will need to continue to monitor his lower extremity in case there is a rare complication such as compartment syndrome.  If he feels that this is going on he needs to follow-up with the emergency room immediately.  This was discussed at his visit today.

## 2024-10-22 ENCOUNTER — TELEPHONE (OUTPATIENT)
Age: 49
End: 2024-10-22

## 2024-10-22 DIAGNOSIS — E78.2 MIXED HYPERLIPIDEMIA: ICD-10-CM

## 2024-10-22 DIAGNOSIS — E66.01 MORBID OBESITY (HCC): ICD-10-CM

## 2024-10-22 DIAGNOSIS — E78.2 MIXED HYPERLIPIDEMIA: Primary | ICD-10-CM

## 2024-10-22 DIAGNOSIS — E11.65 TYPE 2 DIABETES MELLITUS WITH HYPERGLYCEMIA, WITHOUT LONG-TERM CURRENT USE OF INSULIN (HCC): ICD-10-CM

## 2024-10-22 RX ORDER — ROSUVASTATIN CALCIUM 5 MG/1
5 TABLET, COATED ORAL DAILY
Qty: 90 TABLET | Refills: 1 | Status: SHIPPED | OUTPATIENT
Start: 2024-10-22

## 2024-10-22 NOTE — TELEPHONE ENCOUNTER
Patient called the RX Refill Line. Message is being forwarded to the office.     Patient is requesting next script for his Mounjaro medication.    How are you tolerating the medication?   Pt stated he had light symptoms  [x] Nausea  [x] Vomiting  [] Diarrhea  [] Asymptomatic  [] Other:     How many injections do you have left: 2    Would you like an increase in your dose?  [x] Yes   [] No

## 2024-10-22 NOTE — TELEPHONE ENCOUNTER
Reason for call:   [x] Refill   [] Prior Auth  [] Other:     Office:   [x] PCP/Provider - [facility] / Faisal Eric, DO   [] Specialty/Provider -     Medication: rosuvastatin (CRESTOR) 5 mg tablet     Dose/Frequency: Take 1 tablet (5 mg total) by mouth daily     Quantity: 90 tablet     Pharmacy: [facility]/pharmacy #[id number] - [place] - [address] [place] [address].     Does the patient have enough for 3 days?   [] Yes   [x] No - Send as HP to POD

## 2024-10-23 ENCOUNTER — OFFICE VISIT (OUTPATIENT)
Dept: AUDIOLOGY | Age: 49
End: 2024-10-23
Payer: COMMERCIAL

## 2024-10-23 DIAGNOSIS — H91.93 DECREASED HEARING OF BOTH EARS: ICD-10-CM

## 2024-10-23 DIAGNOSIS — H90.3 SENSORY HEARING LOSS, BILATERAL: Primary | ICD-10-CM

## 2024-10-23 PROCEDURE — 92567 TYMPANOMETRY: CPT

## 2024-10-23 PROCEDURE — 92557 COMPREHENSIVE HEARING TEST: CPT

## 2024-10-23 RX ORDER — TIRZEPATIDE 5 MG/.5ML
5 INJECTION, SOLUTION SUBCUTANEOUS
Qty: 2 ML | Refills: 0 | Status: SHIPPED | OUTPATIENT
Start: 2024-10-23 | End: 2024-11-20

## 2024-10-23 NOTE — PROGRESS NOTES
Diagnostic Hearing Evaluation    Name:  Mario Jenkins  :  1975  Age:  49 y.o.   MRN:  [id number]  Date of Evaluation: 10/23/24     HISTORY:     Reason for visit: Difficulty Understanding    Mario Jenkins is being seen today at the request of Dr. Eric for an initial  evaluation of hearing. The patient reports an increased difficulty hearing especially in background noise. The patient  denies otalgia, otorrhea, fullness, tinnitus, and noise exposure. He does experience dizziness when standing up and sitting don to fast. He does note age related family history of hearing loss.    EVALUATION:    Otoscopic Evaluation:   Right Ear: Unremarkable, canal clear   Left Ear: Unremarkable, canal clear    Tympanometry:   Right Ear: Type A; normal middle ear pressure and static compliance    Left Ear: Type A; normal middle ear pressure and static compliance     Speech Audiometry:  Speech Reception (SRT)    Right Ear: 20 dB HL    Left Ear: 20 dB HL    Word Recognition Scores (WRS):  Right Ear: excellent (92 % correct)     Left Ear: excellent (96 % correct)    Stimuli: [id number]    Pure Tone Audiometry:  Conventional pure tone audiometry from 250 - 8000 Hz  was obtained with good reliability and revealed the following:     Right Ear: Normal sloping to mild sensorineural hearing loss rising to normal hearing    Left Ear: Mild rising to normal sloping to mild sensorineural hearing loss     *see attached audiogram    RECOMMENDATIONS:  Annual hearing eval, Return to [facility]. for F/U, and Copy to Patient/Caregiver    PATIENT EDUCATION:   The results of today's results and recommendations were reviewed with the patient and his hearing thresholds were explained at length. Treatment options, including amplification and communication strategies, were discussed as appropriate. The patient voiced understanding of his test results. Questions were addressed and the patient was encouraged to contact our department should concerns  arise.      Julio Estes., CCC-A  Clinical Audiologist  [facility]  [address] [phone]

## 2024-10-28 ENCOUNTER — TELEPHONE (OUTPATIENT)
Dept: FAMILY MEDICINE CLINIC | Facility: CLINIC | Age: 49
End: 2024-10-28

## 2024-10-28 DIAGNOSIS — E11.65 TYPE 2 DIABETES MELLITUS WITH HYPERGLYCEMIA, WITHOUT LONG-TERM CURRENT USE OF INSULIN (HCC): ICD-10-CM

## 2024-10-28 DIAGNOSIS — E78.2 MIXED HYPERLIPIDEMIA: ICD-10-CM

## 2024-10-28 DIAGNOSIS — E66.01 MORBID OBESITY (HCC): ICD-10-CM

## 2024-10-28 RX ORDER — TIRZEPATIDE 5 MG/.5ML
5 INJECTION, SOLUTION SUBCUTANEOUS
Qty: 2 ML | Refills: 0 | Status: SHIPPED | OUTPATIENT
Start: 2024-10-28 | End: 2024-10-31 | Stop reason: SDUPTHER

## 2024-10-28 NOTE — TELEPHONE ENCOUNTER
Patient reaching out today regarding mounjaro injection. Reports the medication accidentally got left out for 8 hours bringing it to room temp and he spoke with the pharmacist afterwards who made him aware the medication would only be good for 21 days then (refill request submitted). However, patient reports he did put it back in the fridge then and just wants to make sure that, that is still okay. Please reach out and let him know if this would cause any issues.

## 2024-10-28 NOTE — TELEPHONE ENCOUNTER
I reviewed this.  I found the same information.  I will send message to patient over [employer].     DO [facility]  10/28/2024 9:40 AM

## 2024-10-28 NOTE — TELEPHONE ENCOUNTER
Reason for call:   [x] Refill-medication accidentally got left out and to room temperature so its only going to be good for 21 days. Insurance will refill on the 5th. Patient requesting refill be sent in so that he can get it on the 5th and continue injections since he wont be able to use the last 2 in his current box.   [] Prior Auth  [] Other:     Office:   [x] PCP/Provider - [place] [facility]  [] Specialty/Provider -     Medication: Tirzepatide (Mounjaro) 5 MG/0.5ML SOAJ     Dose/Frequency:  Inject 5 mg under the skin every 7 days for 28 days     Quantity: 2mL    Pharmacy: [facility]/pharmacy #[id number] - [place] - [address] [place] [address]     Does the patient have enough for 3 days?   [x] Yes   [] No - Send as HP to POD

## 2024-10-31 ENCOUNTER — TELEPHONE (OUTPATIENT)
Dept: FAMILY MEDICINE CLINIC | Facility: CLINIC | Age: 49
End: 2024-10-31

## 2024-10-31 DIAGNOSIS — E11.65 TYPE 2 DIABETES MELLITUS WITH HYPERGLYCEMIA, WITHOUT LONG-TERM CURRENT USE OF INSULIN (HCC): ICD-10-CM

## 2024-10-31 DIAGNOSIS — E78.2 MIXED HYPERLIPIDEMIA: ICD-10-CM

## 2024-10-31 DIAGNOSIS — E66.01 MORBID OBESITY (HCC): ICD-10-CM

## 2024-10-31 RX ORDER — TIRZEPATIDE 5 MG/.5ML
5 INJECTION, SOLUTION SUBCUTANEOUS
Qty: 2 ML | Refills: 0 | Status: SHIPPED | OUTPATIENT
Start: 2024-10-31 | End: 2024-11-28

## 2024-10-31 NOTE — TELEPHONE ENCOUNTER
Called patient.  See other phone message associated with [employer].     DO [facility]  10/31/2024 2:13 PM

## 2024-10-31 NOTE — TELEPHONE ENCOUNTER
Patient called in to follow up on request for new order for Mounjaro 5 mg dose, He takes injections on Tuesday.     The pen that was left out is good for 2 of the next injections; 21--day period will . You will need refill of of the Tirzepatide (Mounjaro) 5 MG/0.5ML SOAJ due to damaged product for 3rd injection of this dose on .     Patient is requesting refill order be sent in today for 5 mg for next injection due  for patient to get full 4 doses from new pen and classify the current pen as damaged. Please follow up with patient for provider response for new order today.

## 2024-10-31 NOTE — TELEPHONE ENCOUNTER
Patient requested that I send a new prescription over to the pharmacy claiming the previous 1 was damage medication.  We will see if this is true when the patient attempts to  the medication.

## 2024-11-11 DIAGNOSIS — I10 ESSENTIAL HYPERTENSION: ICD-10-CM

## 2024-11-11 RX ORDER — LISINOPRIL 30 MG/1
30 TABLET ORAL DAILY
Qty: 90 TABLET | Refills: 1 | Status: SHIPPED | OUTPATIENT
Start: 2024-11-11

## 2024-11-11 NOTE — TELEPHONE ENCOUNTER
Reason for call:   [x] Refill   [] Prior Auth  [] Other:     Office:   [x] PCP/Provider - Faisal Eric, DO  [] Specialty/Provider -     Medication:   lisinopril (ZESTRIL) 30 mg tablet       Dose/Frequency: 30 mg, Oral, Daily     Quantity: 90    Pharmacy:  [facility]/pharmacy #[id number] - [place] - [address] [facility].     Does the patient have enough for 3 days?   [] Yes   [x] No - Send as HP to POD

## 2024-11-28 DIAGNOSIS — E11.65 TYPE 2 DIABETES MELLITUS WITH HYPERGLYCEMIA, WITHOUT LONG-TERM CURRENT USE OF INSULIN (HCC): Primary | ICD-10-CM

## 2024-11-28 DIAGNOSIS — E66.01 MORBID OBESITY (HCC): ICD-10-CM

## 2024-11-28 RX ORDER — TIRZEPATIDE 7.5 MG/.5ML
7.5 INJECTION, SOLUTION SUBCUTANEOUS WEEKLY
Qty: 2 ML | Refills: 0 | Status: SHIPPED | OUTPATIENT
Start: 2024-11-28 | End: 2024-12-02

## 2024-11-29 ENCOUNTER — TELEPHONE (OUTPATIENT)
Dept: FAMILY MEDICINE CLINIC | Facility: CLINIC | Age: 49
End: 2024-11-29

## 2024-11-29 DIAGNOSIS — E78.2 MIXED HYPERLIPIDEMIA: ICD-10-CM

## 2024-11-29 DIAGNOSIS — E66.01 MORBID OBESITY (HCC): ICD-10-CM

## 2024-11-29 DIAGNOSIS — I10 ESSENTIAL HYPERTENSION: ICD-10-CM

## 2024-11-29 DIAGNOSIS — E11.65 TYPE 2 DIABETES MELLITUS WITH HYPERGLYCEMIA, WITHOUT LONG-TERM CURRENT USE OF INSULIN (HCC): Primary | ICD-10-CM

## 2024-12-02 ENCOUNTER — TELEPHONE (OUTPATIENT)
Age: 49
End: 2024-12-02

## 2024-12-02 RX ORDER — TIRZEPATIDE 7.5 MG/.5ML
7.5 INJECTION, SOLUTION SUBCUTANEOUS WEEKLY
Qty: 2 ML | Refills: 0 | Status: SHIPPED | OUTPATIENT
Start: 2024-12-02

## 2024-12-02 NOTE — TELEPHONE ENCOUNTER
Please have Dr. Eric review, patient was taking mounjaro,and we received a request for zepbound 7.5, which is not covered by patient's plan.

## 2024-12-02 NOTE — TELEPHONE ENCOUNTER
Patient called requesting refill for moumjaro 7.5 mg. Patient made aware medication was refilled on 12/2/24 for 2ml with 0 refills to [facility] pharmacy. Patient instructed to contact the pharmacy to obtain refills of medication. Patient verbalized understanding.

## 2024-12-02 NOTE — TELEPHONE ENCOUNTER
Appropriately placed order for zepbound instead of mounjaro.  Prior medication was covered under mounjaro.  New orer placed.  I appreciate prior auth department making me aware of this.

## 2024-12-12 DIAGNOSIS — E78.2 MIXED HYPERLIPIDEMIA: ICD-10-CM

## 2024-12-12 DIAGNOSIS — E78.1 HYPERTRIGLYCERIDEMIA: ICD-10-CM

## 2024-12-13 RX ORDER — FENOFIBRATE 54 MG/1
108 TABLET ORAL DAILY
Qty: 180 TABLET | Refills: 1 | Status: SHIPPED | OUTPATIENT
Start: 2024-12-13

## 2024-12-22 DIAGNOSIS — E11.65 TYPE 2 DIABETES MELLITUS WITH HYPERGLYCEMIA, WITHOUT LONG-TERM CURRENT USE OF INSULIN (HCC): Primary | ICD-10-CM

## 2024-12-22 DIAGNOSIS — E78.1 HYPERTRIGLYCERIDEMIA: ICD-10-CM

## 2024-12-22 DIAGNOSIS — E66.01 MORBID OBESITY (HCC): ICD-10-CM

## 2024-12-22 DIAGNOSIS — E78.2 MIXED HYPERLIPIDEMIA: ICD-10-CM

## 2024-12-22 RX ORDER — TIRZEPATIDE 10 MG/.5ML
10 INJECTION, SOLUTION SUBCUTANEOUS WEEKLY
Qty: 2 ML | Refills: 0 | Status: SHIPPED | OUTPATIENT
Start: 2024-12-22

## 2025-01-06 ENCOUNTER — APPOINTMENT (OUTPATIENT)
Dept: LAB | Facility: CLINIC | Age: 50
End: 2025-01-06
Payer: COMMERCIAL

## 2025-01-06 DIAGNOSIS — E78.1 HYPERTRIGLYCERIDEMIA: ICD-10-CM

## 2025-01-06 DIAGNOSIS — E66.01 MORBID OBESITY (HCC): ICD-10-CM

## 2025-01-06 DIAGNOSIS — E11.65 TYPE 2 DIABETES MELLITUS WITH HYPERGLYCEMIA, WITHOUT LONG-TERM CURRENT USE OF INSULIN (HCC): ICD-10-CM

## 2025-01-06 DIAGNOSIS — E78.2 MIXED HYPERLIPIDEMIA: ICD-10-CM

## 2025-01-06 LAB
CHOLEST SERPL-MCNC: 143 MG/DL (ref ?–200)
EST. AVERAGE GLUCOSE BLD GHB EST-MCNC: 209 MG/DL
HBA1C MFR BLD: 8.9 %
HDLC SERPL-MCNC: 33 MG/DL
LDLC SERPL CALC-MCNC: 55 MG/DL (ref 0–100)
NONHDLC SERPL-MCNC: 110 MG/DL
TRIGL SERPL-MCNC: 273 MG/DL (ref ?–150)

## 2025-01-06 PROCEDURE — 80061 LIPID PANEL: CPT

## 2025-01-06 PROCEDURE — 36415 COLL VENOUS BLD VENIPUNCTURE: CPT

## 2025-01-06 PROCEDURE — 83036 HEMOGLOBIN GLYCOSYLATED A1C: CPT

## 2025-01-10 ENCOUNTER — OFFICE VISIT (OUTPATIENT)
Dept: FAMILY MEDICINE CLINIC | Facility: CLINIC | Age: 50
End: 2025-01-10
Payer: COMMERCIAL

## 2025-01-10 ENCOUNTER — TELEPHONE (OUTPATIENT)
Age: 50
End: 2025-01-10

## 2025-01-10 VITALS
HEIGHT: 71 IN | HEART RATE: 93 BPM | BODY MASS INDEX: 43.4 KG/M2 | OXYGEN SATURATION: 99 % | SYSTOLIC BLOOD PRESSURE: 122 MMHG | DIASTOLIC BLOOD PRESSURE: 74 MMHG | RESPIRATION RATE: 16 BRPM | WEIGHT: 310 LBS

## 2025-01-10 DIAGNOSIS — I10 ESSENTIAL HYPERTENSION: ICD-10-CM

## 2025-01-10 DIAGNOSIS — G89.29 CHRONIC BILATERAL THORACIC BACK PAIN: ICD-10-CM

## 2025-01-10 DIAGNOSIS — E78.2 MIXED HYPERLIPIDEMIA: ICD-10-CM

## 2025-01-10 DIAGNOSIS — E66.01 MORBID OBESITY (HCC): ICD-10-CM

## 2025-01-10 DIAGNOSIS — M54.6 CHRONIC BILATERAL THORACIC BACK PAIN: ICD-10-CM

## 2025-01-10 DIAGNOSIS — E78.1 HYPERTRIGLYCERIDEMIA: ICD-10-CM

## 2025-01-10 DIAGNOSIS — M54.2 CERVICAL PAIN (NECK): ICD-10-CM

## 2025-01-10 DIAGNOSIS — M54.50 LUMBAR PAIN: ICD-10-CM

## 2025-01-10 DIAGNOSIS — M25.69 DECREASED ROM OF TRUNK AND BACK: ICD-10-CM

## 2025-01-10 DIAGNOSIS — Z12.11 SCREENING FOR COLON CANCER: ICD-10-CM

## 2025-01-10 DIAGNOSIS — Z83.49 FAMILY HISTORY OF THYROID DISEASE IN FATHER: ICD-10-CM

## 2025-01-10 DIAGNOSIS — M62.838 MUSCLE SPASM: ICD-10-CM

## 2025-01-10 DIAGNOSIS — E11.65 TYPE 2 DIABETES MELLITUS WITH HYPERGLYCEMIA, WITHOUT LONG-TERM CURRENT USE OF INSULIN (HCC): Primary | ICD-10-CM

## 2025-01-10 PROCEDURE — 99214 OFFICE O/P EST MOD 30 MIN: CPT | Performed by: FAMILY MEDICINE

## 2025-01-10 RX ORDER — TIRZEPATIDE 12.5 MG/.5ML
12.5 INJECTION, SOLUTION SUBCUTANEOUS WEEKLY
Qty: 2 ML | Refills: 0 | Status: SHIPPED | OUTPATIENT
Start: 2025-01-10

## 2025-01-10 RX ORDER — FENOFIBRATE 145 MG/1
145 TABLET, COATED ORAL DAILY
Qty: 90 TABLET | Refills: 1 | Status: SHIPPED | OUTPATIENT
Start: 2025-01-10

## 2025-01-10 NOTE — PROGRESS NOTES
Outpatient Note- Follow up     HPI:     Mario Jenkins , 49 y.o. male  presents today for chronic conditions.  The patient has a history of hypertension, plaque psoriasis, type 2 diabetes, anxiety, morbid obesity, and mixed hyperlipidemia with hypertriglyceridemia.  The patient has been attempting Mounjaro and has been increasing his dosage from the 2.5 mg up to the 10 mg which she is taking now.  Over the course of the last several months, he has noticed that his appetite and eating has been changing.  He feels that it is more difficult to consume protein and it almost makes him nauseous to think about.  He has been eating more carbs since that seems to settle his stomach, and despite starting this medication he recently obtained labs that demonstrated increased A1c from 8.1-8.9.  We did discontinue the Januvia he was on previously due to concern about hypoglycemia.  This does not seem to have occurred.  Patient has continued to attempt to workout, and although there has been no weight loss, he is still hopeful that the GLP-1's will help his weight and diabetes.    The patient also notes several other concerns including his colorectal screening.  He is interested in seeing colorectal surgeon out of [facility].  Referral was placed for this issue.    Lastly he brought up at the end of the visit his back pain in multiple areas especially his neck.  He has decreased range of motion, spasms, and finds it difficult to get into positions where it is comfortable.  He has been going to a chiropractor over the last 6 weeks but does not feel that this has been helpful/successful in treating his symptoms.        Past Medical History:   Diagnosis Date    Bilateral knee pain 06/22/2022    Chronic left shoulder pain 03/06/2023    Diabetes (HCC)     Elevated ALT measurement 05/11/2022    Elevated fasting blood sugar 08/18/2020    Hyperlipidemia 01/28/2020    Hypertension     Hyponatremia 05/24/2023    Leukocytosis  01/28/2020    Mid back pain 03/06/2023    Morbid obesity with BMI of 45.0-49.9, adult (HCC)     Psoriasis 12/10/2019    Seborrheic dermatitis 01/22/2019      ROS:   Review of Systems   He denies any new fever, chills, nausea, vomiting, lightheadedness, dizziness.  He continues to have fatigue, tiredness, lack of weight loss, and pain and discomfort in multiple areas of the back    OBJECTIVE  Vitals:    01/10/25 0755   BP: 122/74   Pulse: 93   Resp: 16   SpO2: 99%        Physical Exam  Constitutional:       General: He is not in acute distress.     Appearance: Normal appearance. He is not ill-appearing, toxic-appearing or diaphoretic.   HENT:      Head: Normocephalic and atraumatic.      Right Ear: Tympanic membrane, ear canal and external ear normal. There is no impacted cerumen.      Left Ear: Tympanic membrane, ear canal and external ear normal. There is no impacted cerumen.      Nose: Nose normal. No congestion or rhinorrhea.      Mouth/Throat:      Mouth: Mucous membranes are moist.      Pharynx: Oropharynx is clear. No oropharyngeal exudate or posterior oropharyngeal erythema.   Eyes:      General:         Right eye: No discharge.         Left eye: No discharge.      Pupils: Pupils are equal, round, and reactive to light.   Cardiovascular:      Rate and Rhythm: Normal rate and regular rhythm.      Heart sounds: Normal heart sounds. No murmur heard.     No friction rub. No gallop.   Pulmonary:      Effort: Pulmonary effort is normal. No respiratory distress.      Breath sounds: Normal breath sounds. No stridor. No wheezing, rhonchi or rales.   Skin:     Capillary Refill: Capillary refill takes less than 2 seconds.   Neurological:      Mental Status: He is alert.            ASSESSMENT AND PLAN   Mario was seen today for follow-up.    Diagnoses and all orders for this visit:    Type 2 diabetes mellitus with hyperglycemia, without long-term current use of insulin (HCC)  Morbid obesity (HCC)  Patient presents with  a to follow-up for chronic conditions.  He has history of type 2 diabetes which unfortunately his A1c has increased.  This is likely due to the reduction in hypoglycemic oral medications.  We reduce the Januvia due to concern for hypoglycemia with Mounjaro.  This does not seem to be the issue, but since we are continuing to increase this medication we will hold off on restarting oral medications.  He will need follow-up A1c in 3 months.  -     Tirzepatide (Mounjaro) 12.5 MG/0.5ML SOAJ; Inject 12.5 mg under the skin once a week  -     Hemoglobin A1C; Future  -     Lipid panel; Future  -     fenofibrate (TRICOR) 145 mg tablet; Take 1 tablet (145 mg total) by mouth daily    Mixed hyperlipidemia  Hypertriglyceridemia  Patient has history of hypertriglyceridemia and mixed hyperlipidemia.  Typically his LDL, total cholesterol, and non-HDL is well-controlled on the statin he is on.  The triglycerides were very high the last time he obtain labs close to 500, therefore Tricor was started.  He is up to 108 mg, and this has significantly reduced his values.  He requires an increase to 145 mg to continue to help monitor and manage values.  -     Lipid panel; Future  -     fenofibrate (TRICOR) 145 mg tablet; Take 1 tablet (145 mg total) by mouth daily    Essential hypertension  Stable.  Continue with lisinopril 30 mg daily    Screening for colon cancer  Patient prefers a colorectal surgeon that his family goes to.  Requests a referral placed for colonoscopy       Ambulatory Referral to Colorectal Surgery; Future    Family history of thyroid disease in father  Patient has a family history of thyroid disease, no cancer in the family that would prevent him from a GLP-1, but we will continue to monitor TSH and T4 to avoid any complications with GLP-1/Mounjaro.  -     TSH + Free T4; Future    Cervical pain (neck)  Lumbar pain  Chronic bilateral thoracic back pain  Muscle spasm  Decreased ROM of trunk and back  Patient notes at the  end of his visit multiple areas of pain and discomfort especially in the cervical and low back.  We we will have the patient trial physical therapy, for any reason if this is making his symptoms worse he will need to follow-up for a full visit to review history and further evaluation.  -     Ambulatory Referral to Physical Therapy; Future        DO Urban Sinclair [facility]  1/10/2025 8:26 AM

## 2025-01-13 NOTE — TELEPHONE ENCOUNTER
BR[facility] Noonan 12.5 MG/0.5ML  APPROVED         Patient advised by          []MyChart Message  []Phone call   [x]LMOM  []L/M to call office as no active Communication consent on file  []Unable to leave detailed message as VM not approved on Communication consent       Pharmacy advised by    [x]Fax  []Phone call

## 2025-01-30 DIAGNOSIS — E11.65 TYPE 2 DIABETES MELLITUS WITH HYPERGLYCEMIA, WITHOUT LONG-TERM CURRENT USE OF INSULIN (HCC): ICD-10-CM

## 2025-01-30 RX ORDER — DAPAGLIFLOZIN 10 MG/1
10 TABLET, FILM COATED ORAL DAILY
Qty: 90 TABLET | Refills: 1 | Status: SHIPPED | OUTPATIENT
Start: 2025-01-30

## 2025-02-18 DIAGNOSIS — E78.2 MIXED HYPERLIPIDEMIA: ICD-10-CM

## 2025-02-18 DIAGNOSIS — I10 ESSENTIAL HYPERTENSION: ICD-10-CM

## 2025-02-18 DIAGNOSIS — E78.1 HYPERTRIGLYCERIDEMIA: ICD-10-CM

## 2025-02-18 DIAGNOSIS — E11.65 TYPE 2 DIABETES MELLITUS WITH HYPERGLYCEMIA, WITHOUT LONG-TERM CURRENT USE OF INSULIN (HCC): Primary | ICD-10-CM

## 2025-02-18 DIAGNOSIS — E66.01 MORBID OBESITY (HCC): ICD-10-CM

## 2025-02-18 RX ORDER — TIRZEPATIDE 15 MG/.5ML
15 INJECTION, SOLUTION SUBCUTANEOUS WEEKLY
Qty: 2 ML | Refills: 2 | Status: SHIPPED | OUTPATIENT
Start: 2025-02-18

## 2025-02-18 RX ORDER — TIRZEPATIDE 15 MG/.5ML
15 INJECTION, SOLUTION SUBCUTANEOUS WEEKLY
Qty: 6 ML | Refills: 0 | Status: SHIPPED | OUTPATIENT
Start: 2025-02-18 | End: 2025-02-18

## 2025-03-08 DIAGNOSIS — E11.65 TYPE 2 DIABETES MELLITUS WITH HYPERGLYCEMIA, WITHOUT LONG-TERM CURRENT USE OF INSULIN (HCC): ICD-10-CM

## 2025-03-18 DIAGNOSIS — E11.65 TYPE 2 DIABETES MELLITUS WITH HYPERGLYCEMIA, WITHOUT LONG-TERM CURRENT USE OF INSULIN (HCC): ICD-10-CM

## 2025-03-18 DIAGNOSIS — E78.2 MIXED HYPERLIPIDEMIA: ICD-10-CM

## 2025-03-18 DIAGNOSIS — I10 ESSENTIAL HYPERTENSION: ICD-10-CM

## 2025-03-18 DIAGNOSIS — E66.01 MORBID OBESITY (HCC): ICD-10-CM

## 2025-03-18 DIAGNOSIS — E78.1 HYPERTRIGLYCERIDEMIA: ICD-10-CM

## 2025-03-18 RX ORDER — TIRZEPATIDE 15 MG/.5ML
15 INJECTION, SOLUTION SUBCUTANEOUS WEEKLY
Qty: 2 ML | Refills: 2 | Status: SHIPPED | OUTPATIENT
Start: 2025-03-18

## 2025-03-23 DIAGNOSIS — F41.9 ANXIETY: ICD-10-CM

## 2025-03-24 RX ORDER — ESCITALOPRAM OXALATE 10 MG/1
10 TABLET ORAL DAILY
Qty: 90 TABLET | Refills: 1 | Status: SHIPPED | OUTPATIENT
Start: 2025-03-24

## 2025-04-20 DIAGNOSIS — E78.2 MIXED HYPERLIPIDEMIA: ICD-10-CM

## 2025-04-20 RX ORDER — ROSUVASTATIN CALCIUM 5 MG/1
5 TABLET, COATED ORAL DAILY
Qty: 90 TABLET | Refills: 1 | Status: SHIPPED | OUTPATIENT
Start: 2025-04-20

## 2025-05-06 DIAGNOSIS — I10 ESSENTIAL HYPERTENSION: ICD-10-CM

## 2025-05-06 RX ORDER — LISINOPRIL 30 MG/1
30 TABLET ORAL DAILY
Qty: 90 TABLET | Refills: 1 | Status: SHIPPED | OUTPATIENT
Start: 2025-05-06

## 2025-05-11 DIAGNOSIS — E11.65 TYPE 2 DIABETES MELLITUS WITH HYPERGLYCEMIA, WITHOUT LONG-TERM CURRENT USE OF INSULIN (HCC): ICD-10-CM

## 2025-05-11 RX ORDER — DAPAGLIFLOZIN 10 MG/1
10 TABLET, FILM COATED ORAL DAILY
Qty: 90 TABLET | Refills: 1 | Status: SHIPPED | OUTPATIENT
Start: 2025-05-11

## 2025-06-05 ENCOUNTER — APPOINTMENT (OUTPATIENT)
Dept: LAB | Facility: CLINIC | Age: 50
End: 2025-06-05
Payer: COMMERCIAL

## 2025-06-05 ENCOUNTER — RESULTS FOLLOW-UP (OUTPATIENT)
Dept: FAMILY MEDICINE CLINIC | Facility: CLINIC | Age: 50
End: 2025-06-05

## 2025-06-05 DIAGNOSIS — E66.01 MORBID OBESITY (HCC): ICD-10-CM

## 2025-06-05 DIAGNOSIS — E78.2 MIXED HYPERLIPIDEMIA: ICD-10-CM

## 2025-06-05 DIAGNOSIS — E11.65 TYPE 2 DIABETES MELLITUS WITH HYPERGLYCEMIA, WITHOUT LONG-TERM CURRENT USE OF INSULIN (HCC): ICD-10-CM

## 2025-06-05 DIAGNOSIS — Z83.49 FAMILY HISTORY OF THYROID DISEASE IN FATHER: ICD-10-CM

## 2025-06-05 DIAGNOSIS — E78.1 HYPERTRIGLYCERIDEMIA: ICD-10-CM

## 2025-06-05 LAB
CHOLEST SERPL-MCNC: 152 MG/DL (ref ?–200)
EST. AVERAGE GLUCOSE BLD GHB EST-MCNC: 174 MG/DL
HBA1C MFR BLD: 7.7 %
HDLC SERPL-MCNC: 38 MG/DL
LDLC SERPL CALC-MCNC: 66 MG/DL (ref 0–100)
NONHDLC SERPL-MCNC: 114 MG/DL
T4 FREE SERPL-MCNC: 0.99 NG/DL (ref 0.61–1.12)
TRIGL SERPL-MCNC: 241 MG/DL (ref ?–150)
TSH SERPL DL<=0.05 MIU/L-ACNC: 2.36 UIU/ML (ref 0.45–4.5)

## 2025-06-05 PROCEDURE — 36415 COLL VENOUS BLD VENIPUNCTURE: CPT

## 2025-06-05 PROCEDURE — 84443 ASSAY THYROID STIM HORMONE: CPT

## 2025-06-05 PROCEDURE — 84439 ASSAY OF FREE THYROXINE: CPT

## 2025-06-05 PROCEDURE — 83036 HEMOGLOBIN GLYCOSYLATED A1C: CPT

## 2025-06-05 PROCEDURE — 80061 LIPID PANEL: CPT

## 2025-06-08 DIAGNOSIS — E11.65 TYPE 2 DIABETES MELLITUS WITH HYPERGLYCEMIA, WITHOUT LONG-TERM CURRENT USE OF INSULIN (HCC): ICD-10-CM

## 2025-06-08 DIAGNOSIS — E66.01 MORBID OBESITY (HCC): ICD-10-CM

## 2025-06-08 DIAGNOSIS — E78.2 MIXED HYPERLIPIDEMIA: ICD-10-CM

## 2025-06-08 DIAGNOSIS — I10 ESSENTIAL HYPERTENSION: ICD-10-CM

## 2025-06-08 DIAGNOSIS — E78.1 HYPERTRIGLYCERIDEMIA: ICD-10-CM

## 2025-06-10 RX ORDER — TIRZEPATIDE 15 MG/.5ML
INJECTION, SOLUTION SUBCUTANEOUS
Qty: 2 ML | Refills: 2 | Status: SHIPPED | OUTPATIENT
Start: 2025-06-10

## 2025-06-12 ENCOUNTER — OFFICE VISIT (OUTPATIENT)
Dept: FAMILY MEDICINE CLINIC | Facility: CLINIC | Age: 50
End: 2025-06-12
Payer: COMMERCIAL

## 2025-06-12 VITALS
OXYGEN SATURATION: 98 % | WEIGHT: 289 LBS | DIASTOLIC BLOOD PRESSURE: 82 MMHG | BODY MASS INDEX: 40.46 KG/M2 | SYSTOLIC BLOOD PRESSURE: 117 MMHG | HEART RATE: 89 BPM | HEIGHT: 71 IN

## 2025-06-12 DIAGNOSIS — E78.2 MIXED HYPERLIPIDEMIA: ICD-10-CM

## 2025-06-12 DIAGNOSIS — Z12.11 SCREENING FOR COLON CANCER: ICD-10-CM

## 2025-06-12 DIAGNOSIS — Z00.00 ANNUAL PHYSICAL EXAM: Primary | ICD-10-CM

## 2025-06-12 DIAGNOSIS — E11.65 TYPE 2 DIABETES MELLITUS WITH HYPERGLYCEMIA, WITHOUT LONG-TERM CURRENT USE OF INSULIN (HCC): ICD-10-CM

## 2025-06-12 DIAGNOSIS — H00.015 HORDEOLUM EXTERNUM OF LEFT LOWER EYELID: ICD-10-CM

## 2025-06-12 DIAGNOSIS — T88.7XXA MEDICATION SIDE EFFECT: ICD-10-CM

## 2025-06-12 DIAGNOSIS — H02.89 EYELID PAIN, LEFT: ICD-10-CM

## 2025-06-12 PROBLEM — L40.9 PSORIASIS: Status: ACTIVE | Noted: 2019-02-11

## 2025-06-12 PROCEDURE — 99396 PREV VISIT EST AGE 40-64: CPT | Performed by: FAMILY MEDICINE

## 2025-06-12 RX ORDER — NEOMYCIN SULFATE, POLYMYXIN B SULFATE AND DEXAMETHASONE 3.5; 10000; 1 MG/ML; [USP'U]/ML; MG/ML
1 SUSPENSION/ DROPS OPHTHALMIC 4 TIMES DAILY
Qty: 5 ML | Refills: 0 | Status: SHIPPED | OUTPATIENT
Start: 2025-06-12

## 2025-06-12 RX ORDER — ROSUVASTATIN CALCIUM 10 MG/1
10 TABLET, COATED ORAL DAILY
Qty: 100 TABLET | Refills: 3 | Status: SHIPPED | OUTPATIENT
Start: 2025-06-12

## 2025-06-12 NOTE — PROGRESS NOTES
Adult Annual Physical  Name: Mario Jenkins      : 1975      MRN: [id number]  Encounter Provider: Faisal Eric DO  Encounter Date: 2025   Encounter department: [facility]    :  Assessment & Plan  Annual physical exam  Patient presents for annual physical.  Diet, exercise, overall health, and medications were all reviewed.  He does have an area on the left lower eyelid that he is concerned about that as discussed below.  Patient requests Cologuard for screening for colon cancer.       Mixed hyperlipidemia  Medication side effect  History of hyperlipidemia.  He continues to have elevated triglycerides and the rest of his values for total cholesterol, non-HDL, and LDL have gone up slightly.  Although the triglycerides is the only value that is abnormal, I would recommend that the patient increase his Crestor from 5 mg to 10 mg.  We have already attempted to increase Tricor in the past which has helped slightly, but I do feel that the increase in Crestor will affect all of the values more appropriately.  Will also obtain a hepatic function panel to ensure that Crestor is not causing liver dysfunction.  Orders:    rosuvastatin (CRESTOR) 10 MG tablet; Take 1 tablet (10 mg total) by mouth daily    Lipid panel; Future    Hepatic function panel; Future    Hordeolum externum of left lower eyelid  Eyelid pain, left  Patient likely has stye of lower eyelid.  It is swollen, and has mild increased heat/tenderness.  In order to treat the area, I offered either a topical cream or eyedrops.  Patient opted for eyedrops, Maxitrol, 1 drop in to the eye 4 times a day.  This will only be on the last side due to the pain and discomfort localized.  Orders:    neomycin-polymyxin-dexamethasone (MAXITROL) ophthalmic suspension; Administer 1 drop into the left eye 4 (four) times a day    Screening for colon cancer  Patient is interested in Cologuard for colon cancer screening.  Order  placed.  Orders:    Cologuard    Type 2 diabetes mellitus with hyperglycemia, without long-term current use of insulin ([facility])    Lab Results   Component Value Date    HGBA1C 7.7 (H) 06/05/2025   Patient's type 2 diabetes has significantly improved from 8.9 to 7.7.  This is likely due to the GLP-1 and other medication including metformin and Farxiga.  Patient has not been having any significant side effects from the combination, and it looks as if the A1c is coming down appropriately.  We did review that he has some more room for improvement since our goal for his age would be down to 6.5.  Will continue to monitor and patient will hopefully continue to lose weight as well.    Orders:    Hemoglobin A1C; Future        Preventive Screenings:  - Diabetes Screening: screening not indicated and has diabetes  - Cholesterol Screening: screening not indicated and has hyperlipidemia   - Lung cancer screening: screening not indicated     Immunizations:  - Immunizations due: Zoster (Shingrix)         History of Present Illness     Adult Annual Physical:  Patient presents for annual physical. Patient presents today for annual physical.  Overall his chronic medical issues are fairly well-controlled with current medication.  He did recently obtain labs which we will be going over today and further optimizing.  On presentation, he is concerned about his left eye.  About 2 days ago he started having increased pain and discomfort on the bottom eyelid and the area underneath.  It seems to be more sensitive and swollen at the eyelid line.  There is a small irritation and redness as well.  He denies any changes to vision, pressure or pain behind the eye or in the globe.  It is primarily just underneath the eye and eyelid itself.  He denies any significant drainage..     Diet and Physical Activity:  - Diet/Nutrition: well balanced diet. not as hungry between meals. Still doing three meals a day.  - Exercise: walking and strength training  "exercises. [place]    General Health:  - Sleep: sleeps well and 7-8 hours of sleep on average.  - Hearing: normal hearing right ear and normal hearing left ear. has difficulty with crowds or background noise.  But no different then prior.  - Vision: wears glasses and most recent eye exam < 1 year ago.  - Dental: regular dental visits.    /GYN Health:    - History of STDs: no     Health:  - History of STDs: no.     Review of Systems   Constitutional:  Negative for chills, fever and unexpected weight change.   HENT:  Positive for hearing loss (decreased in crowds). Negative for congestion, ear discharge, ear pain, postnasal drip, rhinorrhea, sinus pressure, sinus pain and sore throat.    Eyes:  Positive for visual disturbance.   Respiratory:  Negative for cough, chest tightness and shortness of breath.    Cardiovascular:  Negative for chest pain and palpitations.   Gastrointestinal:  Positive for constipation (hard stool, 1 month worth). Negative for abdominal pain, blood in stool, diarrhea, nausea and vomiting.   Genitourinary:  Negative for dysuria and frequency.   Skin:  Negative for rash and wound.   Neurological:  Negative for dizziness, light-headedness and headaches.   Psychiatric/Behavioral:  Negative for self-injury and suicidal ideas.      Medical History Reviewed by provider this encounter:  Tobacco  Allergies  Meds  Problems  Surg Hx  Fam Hx  Soc Hx    .  Medications Ordered Prior to Encounter[1]   Social History[2]    Objective   /82   Pulse 89   Ht 5' 11\" (1.803 m)   Wt 131 kg (289 lb)   SpO2 98%   BMI 40.31 kg/m²     Physical Exam  Constitutional:       General: He is not in acute distress.     Appearance: Normal appearance. He is obese. He is not ill-appearing, toxic-appearing or diaphoretic.   HENT:      Head: Normocephalic and atraumatic.      Right Ear: Tympanic membrane, ear canal and external ear normal. There is no impacted cerumen.      Left Ear: Tympanic membrane, ear canal " and external ear normal. There is no impacted cerumen.      Nose: Nose normal. No congestion or rhinorrhea.      Mouth/Throat:      Mouth: Mucous membranes are moist.      Pharynx: Oropharynx is clear. No oropharyngeal exudate or posterior oropharyngeal erythema.     Eyes:      General:         Right eye: No discharge.         Left eye: No discharge.      Pupils: Pupils are equal, round, and reactive to light.       Cardiovascular:      Rate and Rhythm: Normal rate and regular rhythm.      Pulses: no weak pulses.           Dorsalis pedis pulses are 2+ on the right side and 2+ on the left side.        Posterior tibial pulses are 2+ on the right side and 2+ on the left side.      Heart sounds: Normal heart sounds. No murmur heard.     No friction rub. No gallop.   Pulmonary:      Effort: Pulmonary effort is normal. No respiratory distress.      Breath sounds: Normal breath sounds. No stridor. No wheezing, rhonchi or rales.   Abdominal:      General: Abdomen is flat. There is no distension.      Tenderness: There is no abdominal tenderness.   Feet:      Right foot:      Skin integrity: Callus present. No ulcer, skin breakdown, erythema, warmth or dry skin.      Left foot:      Skin integrity: Callus present. No ulcer, skin breakdown, erythema, warmth or dry skin.     Skin:     General: Skin is warm.      Capillary Refill: Capillary refill takes less than 2 seconds.     Neurological:      Mental Status: He is alert.      Motor: No weakness (5/5 in all 4 extremities).         Patient's shoes and socks removed.    Right Foot/Ankle   Right Foot Inspection  Skin Exam: skin normal, skin intact, callus and callus. No dry skin, no warmth, no erythema, no maceration, no abnormal color, no pre-ulcer and no ulcer.     Toe Exam: ROM and strength within normal limits. No tenderness and erythema    Sensory   Monofilament testing: intact    Vascular  Capillary refills: < 3 seconds  The right DP pulse is 2+. The right PT pulse is 2+.      Left Foot/Ankle  Left Foot Inspection  Skin Exam: skin normal, skin intact and callus. No dry skin, no warmth, no erythema, no maceration, normal color, no pre-ulcer and no ulcer.     Toe Exam: ROM and strength within normal limits. No tenderness and no erythema.     Sensory   Monofilament testing: intact    Vascular  Capillary refills: < 3 seconds  The left DP pulse is 2+. The left PT pulse is 2+.     Assign Risk Category  No deformity present  Loss of protective sensation  No weak pulses  Risk: 1             [1]   Current Outpatient Medications on File Prior to Visit   Medication Sig Dispense Refill    Blood Glucose Monitoring Suppl (ONE TOUCH ULTRA 2) w/Device KIT Check blood sugars once daily. Please substitute with appropriate alternative as covered by patient's insurance. Dx: E11.65 1 kit 0    Clenpiq oral solution Use as directed      dapagliflozin (Farxiga) 10 MG tablet Take 1 tablet (10 mg total) by mouth in the morning 90 tablet 1    escitalopram (LEXAPRO) 10 mg tablet TAKE 1 TABLET BY MOUTH EVERY DAY 90 tablet 1    fenofibrate (TRICOR) 145 mg tablet Take 1 tablet (145 mg total) by mouth daily 90 tablet 1    glucose blood (OneTouch Ultra) test strip Check blood sugars once daily. Please substitute with appropriate alternative as covered by patient's insurance. Dx: E11.65 100 each 3    lisinopril (ZESTRIL) 30 mg tablet TAKE 1 TABLET BY MOUTH EVERY DAY 90 tablet 1    metFORMIN (GLUCOPHAGE) 1000 MG tablet TAKE 1 TABLET BY MOUTH TWICE A DAY WITH MEALS 180 tablet 1    Multiple Vitamin (Multivitamin Adult) TABS Take 1 tablet by mouth in the morning.      OneTouch Delica Lancets 33G MISC Check blood sugars once daily. Please substitute with appropriate alternative as covered by patient's insurance. Dx: E11.65 100 each 3    Tirzepatide (Mounjaro) 15 MG/0.5ML SOAJ INJECT CONTENTS OF 1 PEN UNDER THE SKIN ONCE A WEEK 2 mL 2    TREMFYA subcutaneous injection Inject 100 mg under the skin every 56 days       No  current facility-administered medications on file prior to visit.   [2]   Social History  Tobacco Use    Smoking status: Former    Smokeless tobacco: Never    Tobacco comments:     Never smoker per [employer], no smoking in 20 years (2025)   Vaping Use    Vaping status: Never Used   Substance and Sexual Activity    Alcohol use: No     Comment: 1x per month    Drug use: No    Sexual activity: Not Currently     Partners: Female

## 2025-06-12 NOTE — PATIENT INSTRUCTIONS
"Patient Education     Routine physical for adults   The Basics   Written by the doctors and editors at [facility]   What is a physical? -- A physical is a routine visit, or \"check-up,\" with your doctor. You might also hear it called a \"wellness visit\" or \"preventive visit.\"  During each visit, the doctor will:   Ask about your physical and mental health   Ask about your habits, behaviors, and lifestyle   Do an exam   Give you vaccines if needed   Talk to you about any medicines you take   Give advice about your health   Answer your questions  Getting regular check-ups is an important part of taking care of your health. It can help your doctor find and treat any problems you have. But it's also important for preventing health problems.  A routine physical is different from a \"sick visit.\" A sick visit is when you see a doctor because of a health concern or problem. Since physicals are scheduled ahead of time, you can think about what you want to ask the doctor.  How often should I get a physical? -- It depends on your age and health. In general, for people age 21 years and older:   If you are younger than 50 years, you might be able to get a physical every 3 years.   If you are 50 years or older, your doctor might recommend a physical every year.  If you have an ongoing health condition, like diabetes or high blood pressure, your doctor will probably want to see you more often.  What happens during a physical? -- In general, each visit will include:   Physical exam - The doctor or nurse will check your height, weight, heart rate, and blood pressure. They will also look at your eyes and ears. They will ask about how you are feeling and whether you have any symptoms that bother you.   Medicines - It's a good idea to bring a list of all the medicines you take to each doctor visit. Your doctor will talk to you about your medicines and answer any questions. Tell them if you are having any side effects that bother you. You " "should also tell them if you are having trouble paying for any of your medicines.   Habits and behaviors - This includes:   Your diet   Your exercise habits   Whether you smoke, drink alcohol, or use drugs   Whether you are sexually active   Whether you feel safe at home  Your doctor will talk to you about things you can do to improve your health and lower your risk of health problems. They will also offer help and support. For example, if you want to quit smoking, they can give you advice and might prescribe medicines. If you want to improve your diet or get more physical activity, they can help you with this, too.   Lab tests, if needed - The tests you get will depend on your age and situation. For example, your doctor might want to check your:   Cholesterol   Blood sugar   Iron level   Vaccines - The recommended vaccines will depend on your age, health, and what vaccines you already had. Vaccines are very important because they can prevent certain serious or deadly infections.   Discussion of screening - \"Screening\" means checking for diseases or other health problems before they cause symptoms. Your doctor can recommend screening based on your age, risk, and preferences. This might include tests to check for:   Cancer, such as breast, prostate, cervical, ovarian, colorectal, prostate, lung, or skin cancer   Sexually transmitted infections, such as chlamydia and gonorrhea   Mental health conditions like depression and anxiety  Your doctor will talk to you about the different types of screening tests. They can help you decide which screenings to have. They can also explain what the results might mean.   Answering questions - The physical is a good time to ask the doctor or nurse questions about your health. If needed, they can refer you to other doctors or specialists, too.  Adults older than 65 years often need other care, too. As you get older, your doctor will talk to you about:   How to prevent falling at " home   Hearing or vision tests   Memory testing   How to take your medicines safely   Making sure that you have the help and support you need at home  All topics are updated as new evidence becomes available and our peer review process is complete.  This topic retrieved from [employer] on: May 02, 2024.  Topic 897252 Version 1.0  Release: 32.4.3 - C32.122  © 2024 UpToDate, Inc. and/or its affiliates. All rights reserved.  Consumer Information Use and Disclaimer   Disclaimer: This generalized information is a limited summary of diagnosis, treatment, and/or medication information. It is not meant to be comprehensive and should be used as a tool to help the user understand and/or assess potential diagnostic and treatment options. It does NOT include all information about conditions, treatments, medications, side effects, or risks that may apply to a specific patient. It is not intended to be medical advice or a substitute for the medical advice, diagnosis, or treatment of a health care provider based on the health care provider's examination and assessment of a patient's specific and unique circumstances. Patients must speak with a health care provider for complete information about their health, medical questions, and treatment options, including any risks or benefits regarding use of medications. This information does not endorse any treatments or medications as safe, effective, or approved for treating a specific patient. UpToDate, Inc. and its affiliates disclaim any warranty or liability relating to this information or the use thereof.The use of this information is governed by the Terms of Use, available at https://www.w[employer]tersTaxiPixiuwer.com/en/know/clinical-effectiveness-terms. 2024© UpToDate, Inc. and its affiliates and/or licensors. All rights reserved.  Copyright   © 2024 UpToDate, Inc. and/or its affiliates. All rights reserved.

## 2025-06-15 NOTE — ASSESSMENT & PLAN NOTE
Lab Results   Component Value Date    HGBA1C 7.7 (H) 06/05/2025   Patient's type 2 diabetes has significantly improved from 8.9 to 7.7.  This is likely due to the GLP-1 and other medication including metformin and Farxiga.  Patient has not been having any significant side effects from the combination, and it looks as if the A1c is coming down appropriately.  We did review that he has some more room for improvement since our goal for his age would be down to 6.5.  Will continue to monitor and patient will hopefully continue to lose weight as well.    Orders:    Hemoglobin A1C; Future

## 2025-06-15 NOTE — ASSESSMENT & PLAN NOTE
History of hyperlipidemia.  He continues to have elevated triglycerides and the rest of his values for total cholesterol, non-HDL, and LDL have gone up slightly.  Although the triglycerides is the only value that is abnormal, I would recommend that the patient increase his Crestor from 5 mg to 10 mg.  We have already attempted to increase Tricor in the past which has helped slightly, but I do feel that the increase in Crestor will affect all of the values more appropriately.  Will also obtain a hepatic function panel to ensure that Crestor is not causing liver dysfunction.  Orders:    rosuvastatin (CRESTOR) 10 MG tablet; Take 1 tablet (10 mg total) by mouth daily    Lipid panel; Future    Hepatic function panel; Future

## 2025-07-08 DIAGNOSIS — E66.01 MORBID OBESITY (HCC): ICD-10-CM

## 2025-07-08 DIAGNOSIS — E78.1 HYPERTRIGLYCERIDEMIA: ICD-10-CM

## 2025-07-08 DIAGNOSIS — E78.2 MIXED HYPERLIPIDEMIA: ICD-10-CM

## 2025-07-09 DIAGNOSIS — E78.1 HYPERTRIGLYCERIDEMIA: ICD-10-CM

## 2025-07-09 DIAGNOSIS — E66.01 MORBID OBESITY (HCC): ICD-10-CM

## 2025-07-09 DIAGNOSIS — E78.2 MIXED HYPERLIPIDEMIA: ICD-10-CM

## 2025-07-09 DIAGNOSIS — I10 ESSENTIAL HYPERTENSION: ICD-10-CM

## 2025-07-09 DIAGNOSIS — E11.65 TYPE 2 DIABETES MELLITUS WITH HYPERGLYCEMIA, WITHOUT LONG-TERM CURRENT USE OF INSULIN (HCC): ICD-10-CM

## 2025-07-09 RX ORDER — FENOFIBRATE 145 MG/1
145 TABLET, FILM COATED ORAL DAILY
Qty: 90 TABLET | Refills: 1 | Status: SHIPPED | OUTPATIENT
Start: 2025-07-09

## 2025-07-14 ENCOUNTER — TELEPHONE (OUTPATIENT)
Age: 50
End: 2025-07-14

## 2025-07-14 RX ORDER — TIRZEPATIDE 15 MG/.5ML
15 INJECTION, SOLUTION SUBCUTANEOUS WEEKLY
Qty: 2 ML | Refills: 2 | Status: SHIPPED | OUTPATIENT
Start: 2025-07-14 | End: 2025-07-31

## 2025-07-14 RX ORDER — TIRZEPATIDE 2.5 MG/.5ML
INJECTION, SOLUTION SUBCUTANEOUS
OUTPATIENT
Start: 2025-07-14

## 2025-07-14 NOTE — TELEPHONE ENCOUNTER
Patient called in to report insurer rejected Tirzepatide (Mounjaro) 15 MG/0.5ML SOAJ order. RN confirmed to patient order was sent in with receipt confirmed at 1:41 PM and he go notice at 1:42 PM.Patient will follow up with pharmacy to confirm receipt and order will be filled. Next dose is due tomorrow 7/15. Patient will call back if there is a problem with filling order.

## 2025-07-14 NOTE — TELEPHONE ENCOUNTER
Reviewed request.  Patient is on higher dose.  Currently on 15mg weekly.     DO Urban Sinclair [facility]  7/14/2025 1:41 PM

## 2025-07-30 DIAGNOSIS — E66.01 MORBID OBESITY (HCC): ICD-10-CM

## 2025-07-30 DIAGNOSIS — E11.65 TYPE 2 DIABETES MELLITUS WITH HYPERGLYCEMIA, WITHOUT LONG-TERM CURRENT USE OF INSULIN (HCC): ICD-10-CM

## 2025-07-30 DIAGNOSIS — E78.2 MIXED HYPERLIPIDEMIA: ICD-10-CM

## 2025-07-30 DIAGNOSIS — F41.9 ANXIETY: ICD-10-CM

## 2025-07-30 DIAGNOSIS — E78.1 HYPERTRIGLYCERIDEMIA: ICD-10-CM

## 2025-07-30 DIAGNOSIS — I10 ESSENTIAL HYPERTENSION: ICD-10-CM

## 2025-07-30 RX ORDER — ESCITALOPRAM OXALATE 10 MG/1
10 TABLET ORAL DAILY
Qty: 90 TABLET | Refills: 1 | Status: SHIPPED | OUTPATIENT
Start: 2025-07-30

## 2025-07-31 RX ORDER — TIRZEPATIDE 15 MG/.5ML
15 INJECTION, SOLUTION SUBCUTANEOUS WEEKLY
Qty: 2 ML | Refills: 5 | Status: SHIPPED | OUTPATIENT
Start: 2025-07-31